# Patient Record
Sex: MALE | Race: WHITE | NOT HISPANIC OR LATINO | Employment: OTHER | URBAN - METROPOLITAN AREA
[De-identification: names, ages, dates, MRNs, and addresses within clinical notes are randomized per-mention and may not be internally consistent; named-entity substitution may affect disease eponyms.]

---

## 2017-06-03 ENCOUNTER — HOSPITAL ENCOUNTER (EMERGENCY)
Facility: CLINIC | Age: 67
Discharge: ANOTHER HOSPITAL | End: 2017-06-03
Attending: EMERGENCY MEDICINE | Admitting: EMERGENCY MEDICINE
Payer: MEDICARE

## 2017-06-03 ENCOUNTER — APPOINTMENT (OUTPATIENT)
Dept: CT IMAGING | Facility: CLINIC | Age: 67
End: 2017-06-03
Attending: EMERGENCY MEDICINE
Payer: MEDICARE

## 2017-06-03 ENCOUNTER — APPOINTMENT (OUTPATIENT)
Dept: GENERAL RADIOLOGY | Facility: CLINIC | Age: 67
End: 2017-06-03
Attending: EMERGENCY MEDICINE
Payer: MEDICARE

## 2017-06-03 ENCOUNTER — APPOINTMENT (OUTPATIENT)
Dept: GENERAL RADIOLOGY | Facility: CLINIC | Age: 67
DRG: 917 | End: 2017-06-03
Attending: ANESTHESIOLOGY
Payer: MEDICARE

## 2017-06-03 ENCOUNTER — HOSPITAL ENCOUNTER (INPATIENT)
Facility: CLINIC | Age: 67
LOS: 3 days | Discharge: HOME | DRG: 917 | End: 2017-06-06
Attending: ANESTHESIOLOGY | Admitting: ANESTHESIOLOGY
Payer: MEDICARE

## 2017-06-03 VITALS
RESPIRATION RATE: 12 BRPM | WEIGHT: 175 LBS | DIASTOLIC BLOOD PRESSURE: 53 MMHG | BODY MASS INDEX: 25.92 KG/M2 | OXYGEN SATURATION: 100 % | HEIGHT: 69 IN | SYSTOLIC BLOOD PRESSURE: 101 MMHG | TEMPERATURE: 94.6 F

## 2017-06-03 DIAGNOSIS — E87.6 HYPOKALEMIA: ICD-10-CM

## 2017-06-03 DIAGNOSIS — D72.829 LEUKOCYTOSIS, UNSPECIFIED TYPE: ICD-10-CM

## 2017-06-03 DIAGNOSIS — R07.89 CHEST WALL PAIN: ICD-10-CM

## 2017-06-03 DIAGNOSIS — R41.89 UNRESPONSIVE: ICD-10-CM

## 2017-06-03 DIAGNOSIS — R41.89 UNRESPONSIVE: Primary | ICD-10-CM

## 2017-06-03 DIAGNOSIS — K21.9 GASTROESOPHAGEAL REFLUX DISEASE, ESOPHAGITIS PRESENCE NOT SPECIFIED: ICD-10-CM

## 2017-06-03 DIAGNOSIS — R09.2 RESPIRATORY ARREST (H): ICD-10-CM

## 2017-06-03 DIAGNOSIS — E83.42 HYPOMAGNESEMIA: ICD-10-CM

## 2017-06-03 DIAGNOSIS — F10.90 ALCOHOL USE DISORDER: ICD-10-CM

## 2017-06-03 LAB
ABO + RH BLD: NORMAL
ABO + RH BLD: NORMAL
ALBUMIN SERPL-MCNC: 2.2 G/DL (ref 3.4–5)
ALBUMIN UR-MCNC: 30 MG/DL
ALP SERPL-CCNC: 42 U/L (ref 40–150)
ALT SERPL W P-5'-P-CCNC: 11 U/L (ref 0–70)
AMMONIA PLAS-SCNC: 15 UMOL/L (ref 10–50)
AMPHETAMINES UR QL SCN: NORMAL
ANION GAP SERPL CALCULATED.3IONS-SCNC: 13 MMOL/L (ref 3–14)
ANION GAP SERPL CALCULATED.3IONS-SCNC: 13 MMOL/L (ref 3–14)
APAP SERPL-MCNC: 12 MG/L (ref 10–20)
APAP SERPL-MCNC: 6 MG/L (ref 10–20)
APPEARANCE UR: CLEAR
AST SERPL W P-5'-P-CCNC: 10 U/L (ref 0–45)
BARBITURATES UR QL: NORMAL
BENZODIAZ UR QL: NORMAL
BILIRUB SERPL-MCNC: 0.3 MG/DL (ref 0.2–1.3)
BILIRUB UR QL STRIP: NEGATIVE
BLD GP AB SCN SERPL QL: NORMAL
BLOOD BANK CMNT PATIENT-IMP: NORMAL
BUN SERPL-MCNC: 12 MG/DL (ref 7–30)
BUN SERPL-MCNC: 14 MG/DL (ref 7–30)
CA-I BLD-MCNC: 4.4 MG/DL (ref 4.4–5.2)
CALCIUM SERPL-MCNC: 5.8 MG/DL (ref 8.5–10.1)
CALCIUM SERPL-MCNC: 7 MG/DL (ref 8.5–10.1)
CANNABINOIDS UR QL SCN: NORMAL
CHLORIDE SERPL-SCNC: 118 MMOL/L (ref 94–109)
CHLORIDE SERPL-SCNC: 122 MMOL/L (ref 94–109)
CK SERPL-CCNC: 87 U/L (ref 30–300)
CO2 BLD-SCNC: 16 MMOL/L (ref 21–28)
CO2 SERPL-SCNC: 14 MMOL/L (ref 20–32)
CO2 SERPL-SCNC: 17 MMOL/L (ref 20–32)
COCAINE UR QL: NORMAL
COLOR UR AUTO: YELLOW
CREAT SERPL-MCNC: 0.96 MG/DL (ref 0.66–1.25)
CREAT SERPL-MCNC: 1.12 MG/DL (ref 0.66–1.25)
ERYTHROCYTE [DISTWIDTH] IN BLOOD BY AUTOMATED COUNT: 12.9 % (ref 10–15)
ERYTHROCYTE [DISTWIDTH] IN BLOOD BY AUTOMATED COUNT: 12.9 % (ref 10–15)
ETHANOL SERPL-MCNC: 0.15 G/DL
GFR SERPL CREATININE-BSD FRML MDRD: 66 ML/MIN/1.7M2
GFR SERPL CREATININE-BSD FRML MDRD: 78 ML/MIN/1.7M2
GLUCOSE BLDC GLUCOMTR-MCNC: 263 MG/DL (ref 70–99)
GLUCOSE SERPL-MCNC: 126 MG/DL (ref 70–99)
GLUCOSE SERPL-MCNC: 274 MG/DL (ref 70–99)
GLUCOSE UR STRIP-MCNC: NEGATIVE MG/DL
HCT VFR BLD AUTO: 33.6 % (ref 40–53)
HCT VFR BLD AUTO: 38.9 % (ref 40–53)
HGB BLD-MCNC: 11.6 G/DL (ref 13.3–17.7)
HGB BLD-MCNC: 13.3 G/DL (ref 13.3–17.7)
HGB UR QL STRIP: NEGATIVE
HYALINE CASTS #/AREA URNS LPF: 1 /LPF (ref 0–2)
INR PPP: 1.13 (ref 0.86–1.14)
INTERPRETATION ECG - MUSE: NORMAL
KETONES UR STRIP-MCNC: NEGATIVE MG/DL
LACTATE BLD-SCNC: 3.7 MMOL/L (ref 0.7–2.1)
LACTATE BLD-SCNC: 5.9 MMOL/L (ref 0.7–2.1)
LEUKOCYTE ESTERASE UR QL STRIP: NEGATIVE
LIPASE SERPL-CCNC: 214 U/L (ref 73–393)
MAGNESIUM SERPL-MCNC: 1.6 MG/DL (ref 1.6–2.3)
MAGNESIUM SERPL-MCNC: 2.4 MG/DL (ref 1.6–2.3)
MCH RBC QN AUTO: 31.6 PG (ref 26.5–33)
MCH RBC QN AUTO: 31.8 PG (ref 26.5–33)
MCHC RBC AUTO-ENTMCNC: 34.2 G/DL (ref 31.5–36.5)
MCHC RBC AUTO-ENTMCNC: 34.5 G/DL (ref 31.5–36.5)
MCV RBC AUTO: 92 FL (ref 78–100)
MCV RBC AUTO: 92 FL (ref 78–100)
MUCOUS THREADS #/AREA URNS LPF: PRESENT /LPF
NITRATE UR QL: NEGATIVE
OPIATES UR QL SCN: NORMAL
PCO2 BLD: 46 MM HG (ref 35–45)
PCP UR QL SCN: NORMAL
PH BLD: 7.14 PH (ref 7.35–7.45)
PH UR STRIP: 7 PH (ref 5–7)
PHOSPHATE SERPL-MCNC: 1.7 MG/DL (ref 2.5–4.5)
PLATELET # BLD AUTO: 174 10E9/L (ref 150–450)
PLATELET # BLD AUTO: 201 10E9/L (ref 150–450)
PO2 BLD: 102 MM HG (ref 80–105)
POTASSIUM SERPL-SCNC: 2.8 MMOL/L (ref 3.4–5.3)
POTASSIUM SERPL-SCNC: 3.2 MMOL/L (ref 3.4–5.3)
PROT SERPL-MCNC: 4 G/DL (ref 6.8–8.8)
RBC # BLD AUTO: 3.65 10E12/L (ref 4.4–5.9)
RBC # BLD AUTO: 4.21 10E12/L (ref 4.4–5.9)
RBC #/AREA URNS AUTO: 0 /HPF (ref 0–2)
SALICYLATES SERPL-MCNC: NORMAL MG/DL
SAO2 % BLDA FROM PO2: 96 % (ref 92–100)
SODIUM SERPL-SCNC: 147 MMOL/L (ref 133–144)
SODIUM SERPL-SCNC: 149 MMOL/L (ref 133–144)
SP GR UR STRIP: 1.01 (ref 1–1.03)
SPECIMEN EXP DATE BLD: NORMAL
TROPONIN I SERPL-MCNC: NORMAL UG/L (ref 0–0.04)
TROPONIN I SERPL-MCNC: NORMAL UG/L (ref 0–0.04)
URN SPEC COLLECT METH UR: ABNORMAL
UROBILINOGEN UR STRIP-MCNC: NORMAL MG/DL (ref 0–2)
WBC # BLD AUTO: 21.6 10E9/L (ref 4–11)
WBC # BLD AUTO: 6 10E9/L (ref 4–11)
WBC #/AREA URNS AUTO: 2 /HPF (ref 0–2)

## 2017-06-03 PROCEDURE — 80320 DRUG SCREEN QUANTALCOHOLS: CPT | Performed by: EMERGENCY MEDICINE

## 2017-06-03 PROCEDURE — 83605 ASSAY OF LACTIC ACID: CPT | Performed by: STUDENT IN AN ORGANIZED HEALTH CARE EDUCATION/TRAINING PROGRAM

## 2017-06-03 PROCEDURE — 82330 ASSAY OF CALCIUM: CPT | Performed by: STUDENT IN AN ORGANIZED HEALTH CARE EDUCATION/TRAINING PROGRAM

## 2017-06-03 PROCEDURE — 5A1945Z RESPIRATORY VENTILATION, 24-96 CONSECUTIVE HOURS: ICD-10-PCS | Performed by: ANESTHESIOLOGY

## 2017-06-03 PROCEDURE — 27210136 ZZH KIT CATH ARTERIAL EXT SUPPLY

## 2017-06-03 PROCEDURE — 84484 ASSAY OF TROPONIN QUANT: CPT | Performed by: EMERGENCY MEDICINE

## 2017-06-03 PROCEDURE — 93010 ELECTROCARDIOGRAM REPORT: CPT | Performed by: INTERNAL MEDICINE

## 2017-06-03 PROCEDURE — 25000125 ZZHC RX 250: Performed by: STUDENT IN AN ORGANIZED HEALTH CARE EDUCATION/TRAINING PROGRAM

## 2017-06-03 PROCEDURE — 80329 ANALGESICS NON-OPIOID 1 OR 2: CPT | Performed by: STUDENT IN AN ORGANIZED HEALTH CARE EDUCATION/TRAINING PROGRAM

## 2017-06-03 PROCEDURE — 93005 ELECTROCARDIOGRAM TRACING: CPT | Mod: XU

## 2017-06-03 PROCEDURE — 83735 ASSAY OF MAGNESIUM: CPT | Performed by: STUDENT IN AN ORGANIZED HEALTH CARE EDUCATION/TRAINING PROGRAM

## 2017-06-03 PROCEDURE — 70450 CT HEAD/BRAIN W/O DYE: CPT

## 2017-06-03 PROCEDURE — 51702 INSERT TEMP BLADDER CATH: CPT | Mod: XU

## 2017-06-03 PROCEDURE — 92950 HEART/LUNG RESUSCITATION CPR: CPT

## 2017-06-03 PROCEDURE — 80053 COMPREHEN METABOLIC PANEL: CPT | Performed by: EMERGENCY MEDICINE

## 2017-06-03 PROCEDURE — 80329 ANALGESICS NON-OPIOID 1 OR 2: CPT | Performed by: EMERGENCY MEDICINE

## 2017-06-03 PROCEDURE — 85610 PROTHROMBIN TIME: CPT | Performed by: EMERGENCY MEDICINE

## 2017-06-03 PROCEDURE — 87040 BLOOD CULTURE FOR BACTERIA: CPT | Mod: 91 | Performed by: EMERGENCY MEDICINE

## 2017-06-03 PROCEDURE — 40000940 XR CHEST PORT 1 VW

## 2017-06-03 PROCEDURE — 36415 COLL VENOUS BLD VENIPUNCTURE: CPT

## 2017-06-03 PROCEDURE — 83605 ASSAY OF LACTIC ACID: CPT | Performed by: EMERGENCY MEDICINE

## 2017-06-03 PROCEDURE — 40000275 ZZH STATISTIC RCP TIME EA 10 MIN

## 2017-06-03 PROCEDURE — 36556 INSERT NON-TUNNEL CV CATH: CPT

## 2017-06-03 PROCEDURE — 71010 XR CHEST PORT 1 VW: CPT

## 2017-06-03 PROCEDURE — 96366 THER/PROPH/DIAG IV INF ADDON: CPT | Mod: 59

## 2017-06-03 PROCEDURE — 86900 BLOOD TYPING SEROLOGIC ABO: CPT | Performed by: STUDENT IN AN ORGANIZED HEALTH CARE EDUCATION/TRAINING PROGRAM

## 2017-06-03 PROCEDURE — 83935 ASSAY OF URINE OSMOLALITY: CPT | Performed by: EMERGENCY MEDICINE

## 2017-06-03 PROCEDURE — 36620 INSERTION CATHETER ARTERY: CPT | Mod: GC | Performed by: ANESTHESIOLOGY

## 2017-06-03 PROCEDURE — 20000004 ZZH R&B ICU UMMC

## 2017-06-03 PROCEDURE — 40000014 ZZH STATISTIC ARTERIAL MONITORING DAILY

## 2017-06-03 PROCEDURE — 3E033XZ INTRODUCTION OF VASOPRESSOR INTO PERIPHERAL VEIN, PERCUTANEOUS APPROACH: ICD-10-PCS | Performed by: ANESTHESIOLOGY

## 2017-06-03 PROCEDURE — 80048 BASIC METABOLIC PNL TOTAL CA: CPT | Performed by: STUDENT IN AN ORGANIZED HEALTH CARE EDUCATION/TRAINING PROGRAM

## 2017-06-03 PROCEDURE — 36600 WITHDRAWAL OF ARTERIAL BLOOD: CPT | Mod: XU

## 2017-06-03 PROCEDURE — 83930 ASSAY OF BLOOD OSMOLALITY: CPT | Performed by: EMERGENCY MEDICINE

## 2017-06-03 PROCEDURE — 31500 INSERT EMERGENCY AIRWAY: CPT

## 2017-06-03 PROCEDURE — 27210131 ZZH KIT ART LINE INSERTION

## 2017-06-03 PROCEDURE — 25000125 ZZHC RX 250: Performed by: EMERGENCY MEDICINE

## 2017-06-03 PROCEDURE — 84145 PROCALCITONIN (PCT): CPT | Performed by: STUDENT IN AN ORGANIZED HEALTH CARE EDUCATION/TRAINING PROGRAM

## 2017-06-03 PROCEDURE — 96365 THER/PROPH/DIAG IV INF INIT: CPT | Mod: 59

## 2017-06-03 PROCEDURE — 00000146 ZZHCL STATISTIC GLUCOSE BY METER IP

## 2017-06-03 PROCEDURE — 40000008 ZZH STATISTIC AIRWAY CARE

## 2017-06-03 PROCEDURE — 96368 THER/DIAG CONCURRENT INF: CPT | Mod: 59

## 2017-06-03 PROCEDURE — 82803 BLOOD GASES ANY COMBINATION: CPT

## 2017-06-03 PROCEDURE — 83735 ASSAY OF MAGNESIUM: CPT | Performed by: EMERGENCY MEDICINE

## 2017-06-03 PROCEDURE — 80307 DRUG TEST PRSMV CHEM ANLYZR: CPT | Performed by: EMERGENCY MEDICINE

## 2017-06-03 PROCEDURE — 99292 CRITICAL CARE ADDL 30 MIN: CPT

## 2017-06-03 PROCEDURE — 72125 CT NECK SPINE W/O DYE: CPT

## 2017-06-03 PROCEDURE — 84484 ASSAY OF TROPONIN QUANT: CPT | Performed by: STUDENT IN AN ORGANIZED HEALTH CARE EDUCATION/TRAINING PROGRAM

## 2017-06-03 PROCEDURE — 25000128 H RX IP 250 OP 636: Performed by: STUDENT IN AN ORGANIZED HEALTH CARE EDUCATION/TRAINING PROGRAM

## 2017-06-03 PROCEDURE — 27211117 ZZH CARDIOVERT/DEFIB/PACER SUPP

## 2017-06-03 PROCEDURE — 93005 ELECTROCARDIOGRAM TRACING: CPT

## 2017-06-03 PROCEDURE — 81001 URINALYSIS AUTO W/SCOPE: CPT | Performed by: EMERGENCY MEDICINE

## 2017-06-03 PROCEDURE — 86901 BLOOD TYPING SEROLOGIC RH(D): CPT | Performed by: STUDENT IN AN ORGANIZED HEALTH CARE EDUCATION/TRAINING PROGRAM

## 2017-06-03 PROCEDURE — 99291 CRITICAL CARE FIRST HOUR: CPT | Mod: 25

## 2017-06-03 PROCEDURE — 25000128 H RX IP 250 OP 636: Performed by: EMERGENCY MEDICINE

## 2017-06-03 PROCEDURE — 85027 COMPLETE CBC AUTOMATED: CPT | Performed by: EMERGENCY MEDICINE

## 2017-06-03 PROCEDURE — 25000128 H RX IP 250 OP 636

## 2017-06-03 PROCEDURE — 86850 RBC ANTIBODY SCREEN: CPT | Performed by: STUDENT IN AN ORGANIZED HEALTH CARE EDUCATION/TRAINING PROGRAM

## 2017-06-03 PROCEDURE — 94002 VENT MGMT INPAT INIT DAY: CPT

## 2017-06-03 PROCEDURE — 83690 ASSAY OF LIPASE: CPT | Performed by: EMERGENCY MEDICINE

## 2017-06-03 PROCEDURE — 82550 ASSAY OF CK (CPK): CPT | Performed by: STUDENT IN AN ORGANIZED HEALTH CARE EDUCATION/TRAINING PROGRAM

## 2017-06-03 PROCEDURE — 99291 CRITICAL CARE FIRST HOUR: CPT | Mod: 25 | Performed by: ANESTHESIOLOGY

## 2017-06-03 PROCEDURE — 85027 COMPLETE CBC AUTOMATED: CPT | Performed by: STUDENT IN AN ORGANIZED HEALTH CARE EDUCATION/TRAINING PROGRAM

## 2017-06-03 PROCEDURE — 84100 ASSAY OF PHOSPHORUS: CPT | Performed by: STUDENT IN AN ORGANIZED HEALTH CARE EDUCATION/TRAINING PROGRAM

## 2017-06-03 PROCEDURE — 82140 ASSAY OF AMMONIA: CPT | Performed by: STUDENT IN AN ORGANIZED HEALTH CARE EDUCATION/TRAINING PROGRAM

## 2017-06-03 PROCEDURE — 40000276 ZZH STATISTIC RCP TIME ED VENT EA 10 MIN

## 2017-06-03 PROCEDURE — 40000281 ZZH STATISTIC TRANSPORT TIME EA 15 MIN

## 2017-06-03 RX ORDER — MAGNESIUM SULFATE HEPTAHYDRATE 40 MG/ML
4 INJECTION, SOLUTION INTRAVENOUS EVERY 4 HOURS PRN
Status: DISCONTINUED | OUTPATIENT
Start: 2017-06-03 | End: 2017-06-06 | Stop reason: HOSPADM

## 2017-06-03 RX ORDER — POTASSIUM CHLORIDE 29.8 MG/ML
20 INJECTION INTRAVENOUS
Status: DISCONTINUED | OUTPATIENT
Start: 2017-06-03 | End: 2017-06-06 | Stop reason: HOSPADM

## 2017-06-03 RX ORDER — NALOXONE HYDROCHLORIDE 0.4 MG/ML
.1-.4 INJECTION, SOLUTION INTRAMUSCULAR; INTRAVENOUS; SUBCUTANEOUS
Status: DISCONTINUED | OUTPATIENT
Start: 2017-06-03 | End: 2017-06-03

## 2017-06-03 RX ORDER — POTASSIUM CHLORIDE 29.8 MG/ML
20 INJECTION INTRAVENOUS ONCE
Status: COMPLETED | OUTPATIENT
Start: 2017-06-03 | End: 2017-06-03

## 2017-06-03 RX ORDER — PIPERACILLIN SODIUM, TAZOBACTAM SODIUM 3; .375 G/15ML; G/15ML
3.38 INJECTION, POWDER, LYOPHILIZED, FOR SOLUTION INTRAVENOUS ONCE
Status: COMPLETED | OUTPATIENT
Start: 2017-06-03 | End: 2017-06-03

## 2017-06-03 RX ORDER — POTASSIUM CHLORIDE 1.5 G/1.58G
20-40 POWDER, FOR SOLUTION ORAL
Status: DISCONTINUED | OUTPATIENT
Start: 2017-06-03 | End: 2017-06-06 | Stop reason: HOSPADM

## 2017-06-03 RX ORDER — POTASSIUM CHLORIDE 7.45 MG/ML
10 INJECTION INTRAVENOUS
Status: DISCONTINUED | OUTPATIENT
Start: 2017-06-03 | End: 2017-06-06 | Stop reason: HOSPADM

## 2017-06-03 RX ORDER — LORAZEPAM 2 MG/ML
2 INJECTION INTRAMUSCULAR
Status: DISCONTINUED | OUTPATIENT
Start: 2017-06-03 | End: 2017-06-03 | Stop reason: HOSPADM

## 2017-06-03 RX ORDER — NALOXONE HYDROCHLORIDE 0.4 MG/ML
.1-.4 INJECTION, SOLUTION INTRAMUSCULAR; INTRAVENOUS; SUBCUTANEOUS
Status: DISCONTINUED | OUTPATIENT
Start: 2017-06-03 | End: 2017-06-06 | Stop reason: HOSPADM

## 2017-06-03 RX ORDER — POTASSIUM CL/LIDO/0.9 % NACL 10MEQ/0.1L
10 INTRAVENOUS SOLUTION, PIGGYBACK (ML) INTRAVENOUS
Status: DISCONTINUED | OUTPATIENT
Start: 2017-06-03 | End: 2017-06-06 | Stop reason: HOSPADM

## 2017-06-03 RX ORDER — FENTANYL CITRATE 50 UG/ML
50-100 INJECTION, SOLUTION INTRAMUSCULAR; INTRAVENOUS
Status: DISCONTINUED | OUTPATIENT
Start: 2017-06-03 | End: 2017-06-05

## 2017-06-03 RX ORDER — POTASSIUM CHLORIDE 750 MG/1
20-40 TABLET, EXTENDED RELEASE ORAL
Status: DISCONTINUED | OUTPATIENT
Start: 2017-06-03 | End: 2017-06-06 | Stop reason: HOSPADM

## 2017-06-03 RX ORDER — SODIUM CHLORIDE 9 MG/ML
INJECTION, SOLUTION INTRAVENOUS
Status: DISCONTINUED
Start: 2017-06-03 | End: 2017-06-05 | Stop reason: HOSPADM

## 2017-06-03 RX ADMIN — CALCIUM GLUCONATE 1 G: 94 INJECTION, SOLUTION INTRAVENOUS at 19:48

## 2017-06-03 RX ADMIN — POTASSIUM CHLORIDE 20 MEQ: 29.8 INJECTION, SOLUTION INTRAVENOUS at 19:49

## 2017-06-03 RX ADMIN — VANCOMYCIN HYDROCHLORIDE 1500 MG: 5 INJECTION, POWDER, LYOPHILIZED, FOR SOLUTION INTRAVENOUS at 21:10

## 2017-06-03 RX ADMIN — VASOPRESSIN 2.4 UNITS/HR: 20 INJECTION, SOLUTION INTRAMUSCULAR; SUBCUTANEOUS at 23:06

## 2017-06-03 RX ADMIN — NOREPINEPHRINE BITARTRATE 0.05 MCG/KG/MIN: 1 INJECTION INTRAVENOUS at 18:19

## 2017-06-03 RX ADMIN — NOREPINEPHRINE BITARTRATE 0.7 MCG/KG/MIN: 1 INJECTION INTRAVENOUS at 22:44

## 2017-06-03 RX ADMIN — EPINEPHRINE 0.11 MCG/KG/MIN: 1 INJECTION PARENTERAL at 22:56

## 2017-06-03 RX ADMIN — EPINEPHRINE 0.03 MCG/KG/MIN: 1 INJECTION PARENTERAL at 19:11

## 2017-06-03 RX ADMIN — PIPERACILLIN SODIUM,TAZOBACTAM SODIUM 3.38 G: 3; .375 INJECTION, POWDER, FOR SOLUTION INTRAVENOUS at 20:03

## 2017-06-03 RX ADMIN — FENTANYL CITRATE 50 MCG: 50 INJECTION INTRAMUSCULAR; INTRAVENOUS at 23:35

## 2017-06-03 RX ADMIN — SODIUM CHLORIDE 2000 ML: 9 INJECTION, SOLUTION INTRAVENOUS at 18:10

## 2017-06-03 RX ADMIN — Medication 2 G: at 19:53

## 2017-06-03 NOTE — IP AVS SNAPSHOT
MRN:[id number]                      After Visit Summary   6/3/2017    Jong Galarza    MRN: [id number]           Thank you!     Thank you for choosing [facility] for your care. Our goal is always to provide you with excellent care. Hearing back from our patients is one way we can continue to improve our services. Please take a few minutes to complete the written survey that you may receive in the mail after you visit with us. Thank you!        Patient Information     Date Of Birth          1950        Designated Caregiver       Most Recent Value    Caregiver    Will someone help with your care after discharge? no      About your hospital stay     You were admitted on:  Rosmery 3, 2017 You last received care in the:  [address] [facility]    You were discharged on:  June 6, 2017        Reason for your hospital stay       Found unresponsive, severe metabolic and respiratory acidosis secondary to alcohol use, received chest compressions and was intubated.                  Who to Call     For medical emergencies, please call 911.  For non-urgent questions about your medical care, please call your primary care provider or clinic, None          Attending Provider     Provider Specialty    Brianna Morris MD Anesthesiology    Mittal, Melinda Blum MD Internal Medicine    Osvaldo, Ines Turner MD Internal Medicine       Primary Care Provider    Physician No Ref-Primary       When to contact your care team       Call your primary doctor if you have any of the following: temperature greater than 100.5,  increased shortness of breath, unable to swallow liquids or solids, new chest pain different from current chest wall pain, feel like you are going to pass out, are tempted to use alcohol again.                  After Care Instructions     Activity       Your activity upon discharge: activity as tolerated            Diet       Follow this diet upon discharge:       Advance Diet as Tolerated:  "Regular Diet Adult                  Pending Results     Date and Time Order Name Status Description    6/3/2017 1809 Blood culture Preliminary     6/3/2017 1809 Blood culture Preliminary             Statement of Approval     Ordered          17 1553  I have reviewed and agree with all the recommendations and orders detailed in this document.  EFFECTIVE NOW     Approved and electronically signed by:  Kam Bean PA-C           17 1411  I have reviewed and agree with all the recommendations and orders detailed in this document.  EFFECTIVE NOW     Approved and electronically signed by:  Montana Daily MD             Admission Information     Date & Time Provider Department Dept. Phone    6/3/2017 Ines Riley MD [address] [facility] [place] [phone]      Your Vitals Were     Blood Pressure Pulse Temperature Respirations Weight Pulse Oximetry    147/93 (BP Location: Right arm) 85 98.5  F (36.9  C) (Oral) 18 76.2 kg (167 lb 15.9 oz) 94%    BMI (Body Mass Index)                   24.81 kg/m2           MyChart Information     [employer] lets you send messages to your doctor, view your test results, renew your prescriptions, schedule appointments and more. To sign up, go to [email or web address] . Click on \"Log in\" on the left side of the screen, which will take you to the Welcome page. Then click on \"Sign up Now\" on the right side of the page.     You will be asked to enter the access code listed below, as well as some personal information. Please follow the directions to create your username and password.     Your access code is: [email or web address]  Expires: 2017  7:31 PM     Your access code will  in 90 days. If you need help or a new code, please call your [facility] clinic or [phone].        Care EveryWhere ID     This is your Care EveryWhere ID. This could be used by other organizations to access your [facility] medical records  [id number]           Review of your " medicines      UNREVIEWED medicines. Ask your doctor about these medicines        Dose / Directions    IBUPROFEN PO   Indication:  chest wall pain   This may have changed:  Another medication with the same name was removed. Continue taking this medication, and follow the directions you see here.   Ask about: Which instructions should I use?        Dose:  400 mg   Take 400 mg by mouth every 6 hours as needed for moderate pain   Refills:  0       PROTONIX PO   Indication:  Gastroesophageal Reflux Disease        Dose:  40 mg   Take 40 mg by mouth every morning (before breakfast)   Refills:  0         START taking        Dose / Directions    acetaminophen 500 MG tablet   Commonly known as:  TYLENOL   Used for:  Chest wall pain        Dose:  1000 mg   Take 2 tablets (1,000 mg) by mouth 3 times daily as needed for mild pain or fever   Refills:  0       menthol 5 % Ptch   Commonly known as:  ICY HOT   Used for:  Chest wall pain        Dose:  1 patch   Apply 1 patch topically every 8 hours as needed for muscle soreness   Quantity:  14 patch   Refills:  0       multivitamin, therapeutic Tabs tablet        Dose:  1 tablet   Take 1 tablet by mouth daily   Quantity:  30 tablet   Refills:  0       naproxen 500 MG tablet   Commonly known as:  NAPROSYN   Used for:  Chest wall pain        Dose:  500 mg   Take 1 tablet (500 mg) by mouth 2 times daily (with meals)   Refills:  0         CONTINUE these medicines which have NOT CHANGED        Dose / Directions    LIPITOR 20 MG tablet   Generic drug:  atorvastatin        Dose:  20 mg   Take 20 mg by mouth daily.   Refills:  0       RABEprazole 20 MG EC tablet   Commonly known as:  ACIPHEX   Used for:  Gastroesophageal reflux disease, esophagitis presence not specified        Dose:  20 mg   Take 1 tablet (20 mg) by mouth daily   Quantity:  30 tablet   Refills:  0       VIAGRA 100 MG cap/tab   Generic drug:  sildenafil        Dose:  100 mg   Take 100 mg by mouth daily as needed.   Refills:   0         STOP taking     LORazepam 1 MG tablet   Commonly known as:  ATIVAN           traZODone 100 MG tablet   Commonly known as:  DESYREL           venlafaxine 75 MG tablet   Commonly known as:  EFFEXOR                Where to get your medicines      These medications were sent to [facility] - [place], [place] - [address]  [address] [address]     Phone:  [phone]     menthol 5 % Ptch    multivitamin, therapeutic Tabs tablet         Some of these will need a paper prescription and others can be bought over the counter. Ask your nurse if you have questions.     You don't need a prescription for these medications     acetaminophen 500 MG tablet    naproxen 500 MG tablet    RABEprazole 20 MG EC tablet                Protect others around you: Learn how to safely use, store and throw away your medicines at www.disposemymeds.org.             Medication List: This is a list of all your medications and when to take them. Check marks below indicate your daily home schedule. Keep this list as a reference.      Medications           Morning Afternoon Evening Bedtime As Needed    acetaminophen 500 MG tablet   Commonly known as:  TYLENOL   Take 2 tablets (1,000 mg) by mouth 3 times daily as needed for mild pain or fever   Last time this was given:  1,000 mg on 6/6/2017  4:33 PM                                LIPITOR 20 MG tablet   Take 20 mg by mouth daily.   Generic drug:  atorvastatin                                menthol 5 % Ptch   Commonly known as:  ICY HOT   Apply 1 patch topically every 8 hours as needed for muscle soreness                                multivitamin, therapeutic Tabs tablet   Take 1 tablet by mouth daily   Last time this was given:  1 tablet on 6/6/2017  8:45 AM                                naproxen 500 MG tablet   Commonly known as:  NAPROSYN   Take 1 tablet (500 mg) by mouth 2 times daily (with meals)   Last time this was given:  500 mg on  6/6/2017  7:16 PM                                PROTONIX PO   Take 40 mg by mouth every morning (before breakfast)   Last time this was given:  40 mg on 6/6/2017  8:45 AM                                RABEprazole 20 MG EC tablet   Commonly known as:  ACIPHEX   Take 1 tablet (20 mg) by mouth daily                                VIAGRA 100 MG cap/tab   Take 100 mg by mouth daily as needed.   Generic drug:  sildenafil                                  ASK your doctor about these medications           Morning Afternoon Evening Bedtime As Needed    IBUPROFEN PO   Take 400 mg by mouth every 6 hours as needed for moderate pain   Last time this was given:  400 mg on 6/5/2017  5:47 PM   Ask about: Which instructions should I use?

## 2017-06-03 NOTE — IP AVS SNAPSHOT
` ` Patient Information     Patient Name Sex     BerkowitzJong portillo ([id number]) Male 1950       Room Bed    5234 5234-02      Patient Demographics     Address Phone    [address] [address] [phone] (Home)  [phone] (Mobile)      Patient Ethnicity & Race     Ethnic Group Patient Race     White      Emergency Contact(s)     Name Relation Home Work Mobile    Fay Berkowitz Daughter   [phone]    MARAL BERKOWITZ Brother [phone]        Documents on File        Status Date Received Description       Documents for the Patient    Privacy Notice - [place] Received 11     Insurance Card Received 17 [employer]    External Medication Information Consent       Patient ID Received 17     Consent for Services - Hospital/Clinic Received () 11     Consent for EHR Access Received 17     [facility] Specified Other       Consent for Services/Privacy Notice - Hospital/Clinic Received 17     Privacy Notice - [place] Received 17        Documents for the Encounter    CMS IM for Patient Signature Received 17       Admission Information     Attending Provider Admitting Provider Admission Type Admission Date/Time    [facility]    Discharge Date Hospital Service Auth/Cert Status Service Area     Internal Medicine Incomplete [facility] SERVICES    Unit Room/Bed Admission Status       UU U5B 5234/5234-02 Admission (Confirmed)       Admission     Complaint    respiratory distress, Unresponsive      Hospital Account     Name Acct ID Class Status Primary Coverage    Geovanni Jong KERNS [id number] Inpatient Open MEDICARE - MEDICARE FOR HB SUPPLEMENT            Guarantor Account (for Hospital Account #[id number])     Name Relation to Pt Service Area Active? Acct Type    Jong Berkowitz  FCS Yes Personal/Family    Address Phone          [address] [place] [address]  [phone](H)              Coverage Information (for Hospital Account #[id number])     1. MEDICARE/MEDICARE FOR HB SUPPLEMENT     F/O Payor/Plan Precert #    MEDICARE/MEDICARE FOR HB SUPPLEMENT     Subscriber Subscriber #    Jong Galarza [id number]    Address Phone    [employer]  [address]  [place] [address] [phone]          2. MEDICA/MEDICA PRIME SOLUTION     F/O Payor/Plan Precert #    MEDICA/MEDICA PRIME SOLUTION     Subscriber Subscriber #    Jong Galarza [id number]    Address Phone    [address] [address]  [place] [address] [phone]

## 2017-06-03 NOTE — IP AVS SNAPSHOT
Jong Berkowitz #[id number] (CSN: [id number])  (67 year old M)  (Adm: 17)     [id number]               [address] [facility] BANK: [phone]            Patient Demographics     Patient Name Sex          Age SSN Address Phone    BerkowitzJong Male 1950 (67 year old) [id number] [facility] [id number] [phone] (Home)  [phone] (Mobile)      Emergency Contact(s)     Name Relation Home Work Mobile    Fay Berkowitz Daughter   [phone]    MARAL BERKOWITZ Brother [phone]        Admission Information     Attending Provider Admitting Provider Admission Type Admission Date/Time    Ines Riley MD Weinkauf, Brianna Red MD Urgent 17  2146    Discharge Date Hospital Service Auth/Cert Status Service Area     Internal Medicine [facility]    Unit Room/Bed Admission Status       Critical access hospital [id number]/5234-02 Admission (Confirmed)       Admission     Complaint    respiratory distress, Unresponsive      Hospital Account     Name Acct ID Class Status Primary Coverage    Jong Berkowitz [id number] Inpatient Open MEDICARE - MEDICARE FOR HB SUPPLEMENT            Guarantor Account (for Hospital Account #[id number])     Name Relation to Pt Service Area Active? Acct Type    Jong Berkowitz  FCS Yes Personal/Family    Address Phone          [address] [place], [place] [address] [phone](H)              Coverage Information (for Hospital Account #[id number])     1. MEDICARE/MEDICARE FOR HB SUPPLEMENT     F/O Payor/Plan Precert #    MEDICARE/MEDICARE FOR HB SUPPLEMENT     Subscriber Subscriber #    Jong Berkowitz [id number]    Address Phone    ATTN CLAIMS  [address]  [facility] [place] [address] [phone]          2. MEDICA/MEDICA PRIME SOLUTION     F/O Payor/Plan Precert #    MEDICA/MEDICA PRIME SOLUTION     Subscriber Subscriber #    Jong Berkowitz [id number]    Address Phone    [address] [address]  [place] [address] [phone]  "                                                     INTERAGENCY TRANSFER FORM - PHYSICIAN ORDERS   6/3/2017                       [address] [facility] BANK: [phone]            Attending Provider: Ines Riley MD     Allergies:  Sulfa Drugs    Infection:  None   Service:  INTERNAL MED    Ht:  1.753 m (5' 9\")   Wt:  76.2 kg (167 lb 15.9 oz)   Admission Wt:  75.5 kg (166 lb 7.2 oz)    BMI:  24.81 kg/m 2   BSA:  1.93 m 2            ED Clinical Impression     Diagnosis Description Comment Added By Time Added    Unresponsive [R41.89] Unresponsive [R41.89]  Tamia Zapata MD 6/4/2017 12:41 AM    Alcohol use disorder (H) [F10.99] Alcohol use disorder (H) [F10.99]  Gama García MD 6/5/2017  1:22 PM    Chest wall pain [R07.89] Chest wall pain [R07.89]  Gama García MD 6/5/2017  1:24 PM    Gastroesophageal reflux disease, esophagitis presence not specified [K21.9] Gastroesophageal reflux disease, esophagitis presence not specified [K21.9]  Kam Bean PA-C 6/6/2017  3:51 PM    Leukocytosis, unspecified type [D72.829] Leukocytosis, unspecified type [D72.829]  Kam Bean PA-C 6/6/2017  5:28 PM      Hospital Problems as of 6/6/2017              Priority Class Noted POA    Unresponsive Medium  6/3/2017 Yes    Alcohol use disorder (H) Medium  6/5/2017 Unknown      Non-Hospital Problems as of 6/6/2017     None      Code Status History     Date Active Date Inactive Code Status Order ID Comments User Context    6/5/2017  1:36 PM 6/5/2017  4:26 PM Full Code [id number]  Gmaa García MD Outpatient    6/3/2017 10:13 PM 6/5/2017  1:36 PM Full Code [id number]  Tamia Zapata MD Inpatient      Current Code Status     Date Active Code Status Order ID Comments User Context       6/5/2017  4:26 PM Full Code [id number]  Johny Kiran APRN CNP Inpatient       Summary of Visit     Reason for your hospital stay       Found unresponsive, severe metabolic and " respiratory acidosis secondary to alcohol use, received chest compressions and was intubated.                Medication Review      UNREVIEWED medications. Ask your doctor about these medications        Dose / Directions Comments    IBUPROFEN PO   Indication:  chest wall pain   This may have changed:  Another medication with the same name was removed. Continue taking this medication, and follow the directions you see here.   Ask about: Which instructions should I use?        Dose:  400 mg   Take 400 mg by mouth every 6 hours as needed for moderate pain   Refills:  0        PROTONIX PO   Indication:  Gastroesophageal Reflux Disease        Dose:  40 mg   Take 40 mg by mouth every morning (before breakfast)   Refills:  0          START taking        Dose / Directions Comments    acetaminophen 500 MG tablet   Commonly known as:  TYLENOL   Used for:  Chest wall pain        Dose:  1000 mg   Take 2 tablets (1,000 mg) by mouth 3 times daily as needed for mild pain or fever   Refills:  0        menthol 5 % Ptch   Commonly known as:  ICY HOT   Used for:  Chest wall pain        Dose:  1 patch   Apply 1 patch topically every 8 hours as needed for muscle soreness   Quantity:  14 patch   Refills:  0        multivitamin, therapeutic Tabs tablet        Dose:  1 tablet   Take 1 tablet by mouth daily   Quantity:  30 tablet   Refills:  0        naproxen 500 MG tablet   Commonly known as:  NAPROSYN   Used for:  Chest wall pain        Dose:  500 mg   Take 1 tablet (500 mg) by mouth 2 times daily (with meals)   Refills:  0          CONTINUE these medications which have NOT CHANGED        Dose / Directions Comments    LIPITOR 20 MG tablet   Generic drug:  atorvastatin        Dose:  20 mg   Take 20 mg by mouth daily.   Refills:  0        RABEprazole 20 MG EC tablet   Commonly known as:  ACIPHEX   Used for:  Gastroesophageal reflux disease, esophagitis presence not specified        Dose:  20 mg   Take 1 tablet (20 mg) by mouth daily  "  Quantity:  30 tablet   Refills:  0        VIAGRA 100 MG cap/tab   Generic drug:  sildenafil        Dose:  100 mg   Take 100 mg by mouth daily as needed.   Refills:  0          STOP taking     LORazepam 1 MG tablet   Commonly known as:  ATIVAN           traZODone 100 MG tablet   Commonly known as:  DESYREL           venlafaxine 75 MG tablet   Commonly known as:  EFFEXOR                   After Care     Activity       Your activity upon discharge: activity as tolerated       Diet       Follow this diet upon discharge:       Advance Diet as Tolerated: Regular Diet Adult             Statement of Approval     Ordered          06/06/17 1553  I have reviewed and agree with all the recommendations and orders detailed in this document.  EFFECTIVE NOW     Approved and electronically signed by:  Kam Bean PA-C           06/05/17 1411  I have reviewed and agree with all the recommendations and orders detailed in this document.  EFFECTIVE NOW     Approved and electronically signed by:  [place]ana Daily MD                                                 INTERAGENCY TRANSFER FORM - NURSING   6/3/2017                       [address] [facility] BANK: [phone]            Attending Provider: Ines Riley MD     Allergies:  Sulfa Drugs    Infection:  None   Service:  INTERNAL MED    Ht:  1.753 m (5' 9\")   Wt:  76.2 kg (167 lb 15.9 oz)   Admission Wt:  75.5 kg (166 lb 7.2 oz)    BMI:  24.81 kg/m 2   BSA:  1.93 m 2            Advance Directives        Does patient have a scanned Advance Directive/ACP document in EPIC?           No        Immunizations     None      ASSESSMENT     Discharge Profile Flowsheet     GASTROINTESTINAL (ADULT,PEDIATRIC,OB)     Skin WDL  ex 06/06/17 1840    GI WDL  ex 06/06/17 0345   Skin Color/Characteristics  pale 06/06/17 0345    Abdominal Appearance  contour irregular 06/06/17 0345   Skin Temperature  cool 06/06/17 0345    All Quadrants Bowel Sounds  audible and " "normoactive 06/06/17 0345   Skin Moisture  diaphoretic 06/05/17 1225    All Quadrants Palpation  soft/nontender 06/05/17 1228   Skin Elasticity  quick return to original state 06/05/17 1225    Last Bowel Movement  -- (Unknown) 06/05/17 1225   Skin Integrity  abrasion(s);bruise(s) 06/06/17 1840    COMMUNICATION ASSESSMENT     Additional Documentation  Drains (LDA) 06/04/17 0852    Patient's communication style  spoken language (English or Bilingual) 06/03/17 1845   SAFETY      SKIN     Safety WDL  ex;safety factors 06/06/17 0345    Inspection  Partial (identify areas NOT inspected) 06/06/17 1840   Safety Factors  bed in low position;wheels locked;call light in reach;upper side rails raised x 2;ID band on 06/06/17 0345    Skin areas NOT inspected  Buttock, left;Buttock, right;Sacrum 06/06/17 1840   Safety Equipment  oxygen flowmeter;manual resusciator with appropriate mask;suction equipment 06/05/17 1228                 Assessment WDL (Within Defined Limits) Definitions           Safety WDL     Effective: 09/28/15    Row Information: <b>WDL Definition:</b> Bed in low position, wheels locked; call light in reach; upper side rails up x 2; ID band on<br> <font color=\"gray\"><i>Item=AS safety wdl>>List=AS safety wdl>>Version=F14</i></font>      Skin WDL     Effective: 09/28/15    Row Information: <b>WDL Definition:</b> Warm; dry; intact; elastic; without discoloration; pressure points without redness<br> <font color=\"gray\"><i>Item=AS skin wdl>>List=AS skin wdl>>Version=F14</i></font>      Vitals     Vital Signs Flowsheet     COMMENTS     Body Movements  0 06/04/17 1548    Comments  Extubated 06/04/17 1729   Compliance w/ventilator (intubated patients)  0 06/04/17 1548    VITAL SIGNS     Vocalization (extubated patients)  Intubated 06/04/17 1548    Temp  98.5  F (36.9  C) 06/06/17 1515   Muscle Tension  0 06/04/17 1548    Temp src  Oral 06/06/17 1515   Total  0 06/04/17 1548    Resp  18 06/06/17 1515   ANALGESIA SIDE " EFFECTS MONITORING      Pulse  85 06/06/17 0656   Side Effects Monitoring: Respiratory Quality  R 06/05/17 0405    Heart Rate  90 06/06/17 1515   Side Effects Monitoring: Respiratory Depth  N 06/05/17 0405    Pulse/Heart Rate Source  Monitor 06/06/17 1515   Side Effects Monitoring: Sedation Level  2 06/05/17 0405    BP  (!)  147/93 06/06/17 1515   HEIGHT AND WEIGHT      BP Location  Right arm 06/06/17 1515   Weight  76.2 kg (167 lb 15.9 oz) 06/05/17 0549    OXYGEN THERAPY     POSITIONING      SpO2  94 % 06/06/17 1515   Body Position  independently positioning 06/06/17 1840    O2 Device  None (Room air) 06/06/17 1515   Head of Bed (HOB)  HOB at 20-30 degrees 06/06/17 0353    FiO2 (%)  30 % 06/04/17 1600   Chair  Upright in chair 06/05/17 1328    Oxygen Delivery  4 LPM 06/05/17 0405   Positioning/Transfer Devices  pillows;in use 06/05/17 1457    Suction Occurrance  1 06/04/17 1601   DAILY CARE      PAIN/COMFORT     Activity Type  ambulated in [place] 06/06/17 1856    Patient Currently in Pain  yes 06/06/17 1840   Activity Level of Assistance  assistance, stand-by 06/06/17 1840    Preferred Pain Scale  CAPA (Clinically Aligned Pain Assessment) ([facility] Adults Only) 06/06/17 1840   ECG      Patient's Stated Pain Goal  No pain 06/05/17 0405   ECG Rhythm  Normal sinus rhythm 06/05/17 1328    Pain Location  Chest 06/06/17 1840   Ectopy  None 06/05/17 1218    Pain Orientation  Mid 06/06/17 0920   Lead Monitored  Lead II;V 1 06/05/17 1218    Pain Descriptors  Aching;Sharp;Shooting 06/06/17 0920   Equipment  electrodes changed 06/04/17 0045    Pain Intervention(s)  Medication (See eMAR) 06/06/17 1840   LANCE COMA SCALE      Response to Interventions  Decrease in pain 06/06/17 1417   Best Eye Response  4-->(E4) spontaneous 06/06/17 0922    CLINICALLY ALIGNED PAIN ASSESSMENT (CAPA) ([facility] ADULTS ONLY)     Best Motor Response  6-->(M6) obeys commands 06/06/17 0922    Comfort  comfortably  manageable 06/06/17 1840   Best Verbal Response  5-->(V5) oriented 06/06/17 0922    Change in Pain  getting better 06/06/17 1417   [place] Coma Scale Score  15 06/06/17 0922    Pain Control  partially effective 06/06/17 1417   Assessment Qualifiers  patient not sedated/intubated 06/05/17 0405    Functioning  can do most things, but pain gets in the way of some 06/06/17 1417   POINT OF CARE TESTING      Sleep  awake with occasional pain 06/06/17 1417   Puncture Site  fingertip 06/05/17 0405    CRITICAL-CARE PAIN OBSERVATION TOOL (CPOT)     Bedside Glucose (mg/dl )   265 mg/dl 06/04/17 0424    Facial Expression  0 06/04/17 1548                 Patient Lines/Drains/Airways Status    Active LINES/DRAINS/AIRWAYS     None            Patient Lines/Drains/Airways Status    Active PICC/CVC     None            Intake/Output Detail Report     Date Intake       Output   Net    Shift P.O. I.V. NG/GT IV Piggyback Total Urine Emesis/NG output Total       Meghan 06/05/17 0700 - 06/05/17 3753 702 3745 -- -- 2020 3775 -- 3775 -1755    Noc 06/05/17 1500 - 06/05/17 2359 500 586.67 -- -- 1086.67 1 -- 1 1085.67    Day 06/06/17 0000 - 06/06/17 0659 -- -- -- -- -- 250 -- 250 -250    Meghan 06/06/17 0700 - 06/06/17 1459 -- -- -- -- -- -- -- -- 0    Noc 06/06/17 1500 - 06/06/17 2359 -- -- -- -- -- -- -- -- 0      Last Void/BM       Most Recent Value    Urine Occurrence 1 at 06/06/2017 1748    Stool Occurrence 1 at 06/06/2017 1748      Case Management/Discharge Planning     Case Management/Discharge Planning Flowsheet     LIVING ENVIRONMENT     QUESTION TO PATIENT:  Has a member of your family or a partner(now or in the past) intimidated, hurt, manipulated, or controlled you in any way?  patient declined to answer or is unable to answer 06/04/17 2155    Lives With  child(dane), adult 06/04/17 2154   QUESTION TO PATIENT: Do you feel safe going back to the place where you are living?  patient declined to answer or is unable to answer 06/04/17  2155    COPING/STRESS     OBSERVATION: Is there reason to believe there has been maltreatment of a vulnerable adult (ie. Physical/Sexual/Emotional abuse, self neglect, lack of adequate food, shelter, medical care, or financial exploitation)?  no 06/04/17 2155    Major Change/Loss/Stressor  none (unable to answer) 06/04/17 2157   (R) MENTAL HEALTH SUICIDE RISK      ABUSE RISK SCREEN     Are you depressed or being treated for depression?  Yes (per pt chart, patient being treated for depression) 06/04/17 2157                  [address] [facility] BANK: [phone]            Medication Administration Report for Jong Galarza as of 06/06/17 1931   Legend:    Given Hold Not Given Due Canceled Entry Other Actions    Time Time (Time) Time  Time-Action       Inactive    Active    Linked        Medications 05/31/17 06/01/17 06/02/17 06/03/17 06/04/17 06/05/17 06/06/17    acetaminophen (TYLENOL) tablet 1,000 mg  Dose: 1,000 mg Freq: 3 TIMES DAILY PRN Route: PO  PRN Reasons: mild pain,fever  Start: 06/06/17 1400   Admin Instructions: Maximum acetaminophen dose from all sources = 75 mg/kg/day not to exceed 4 gram           1633 (1,000 mg)-Given           folic acid (FOLVITE) tablet 1 mg  Dose: 1 mg Freq: DAILY Route: PO  Start: 06/05/17 0800         0900 (1 mg)-Given        0845 (1 mg)-Given           glucose 40 % gel 15-30 g  Dose: 15-30 g Freq: EVERY 15 MIN PRN Route: PO  PRN Reason: low blood sugar  Start: 06/04/17 0112   Admin Instructions: Give 15 g for BG 51 to 69 mg/dL IF patient is conscious and able to swallow. Give 30 g for BG less than or equal to 50 mg/dL IF patient is conscious and able to swallow. Do NOT give glucose gel via enteral tube.  IF patient has enteral tube: give apple juice 120 mL (4 oz or 15 g of CHO) via enteral tube for BG 51 to 69 mg/dL.  Give apple juice 240 mL (8 oz or 30 g of CHO) via enteral tube for BG less than or equal to 50 mg/dL.                     Or  dextrose 50 % injection 25-50  mL  Dose: 25-50 mL Freq: EVERY 15 MIN PRN Route: IV  PRN Reason: low blood sugar  Start: 06/04/17 0112   Admin Instructions: Use if have IV access, BG less than 70 mg/dL and meet dose criteria below:  Dose if conscious and alert (or disorientated) and NPO = 25 mL  Dose if unconscious / not alert = 50 mL  Vesicant.         1349 (25 mL)-Given            Or  glucagon injection 1 mg  Dose: 1 mg Freq: EVERY 15 MIN PRN Route: SC  PRN Reason: low blood sugar  PRN Comment: May repeat x 1 only  Start: 06/04/17 0112   Admin Instructions: May give SQ or IM. IF BG less than or equal to 50 mg/dL and no IV access.  ONLY use glucagon IF patient has NO IV access AND is UNABLE to swallow.                      ibuprofen (ADVIL/MOTRIN) tablet 400 mg  Dose: 400 mg Freq: EVERY 6 HOURS PRN Route: PO  PRN Reason: moderate pain  Start: 06/05/17 0933         1747 (400 mg)-Given            lidocaine (LIDODERM) 5 % Patch 3 patch  Dose: 3 patch Freq: EVERY 24 HOURS 0800 Route: TD  Start: 06/05/17 1015   Admin Instructions: Apply patch(s) to lower back and chest wall as needed. To prevent lidocaine toxicity, patient should be patch free for 12 hrs daily. Patches may be cut to smaller size prior to removing release liner.  NEVER APPLY HEAT OVER PATCH which will increase absorption and may lead to risk of local anesthetic toxicity. Do not apply over area where liposomal bupivacaine was injected for 96 hours post injection.          1016 (3 patch)-Given [C]        0958 (3 patch)-Given [C]           lidocaine (LIDODERM) patch in PLACE  Freq: EVERY 8 HOURS Route: TD  Start: 06/05/17 1000   Admin Instructions: Chart every shift, confirming that patch is still in place on patient (no barcode scan needed). See patch order for dose information.  NEVER APPLY HEAT OVER PATCH which will increase absorption and may lead to risk of local anesthetic toxicity. Do not apply over area where liposomal bupivacaine injected for 96 hours.          1017 ( )-Given        1928 ( )-Patch in Place        0200 ( )-Patch in Place       1003 ( )-Patch in Place       1923 ( )-Patch in Place           lidocaine (LIDODERM) patch REMOVAL  Freq: EVERY 24 HOURS 2000 Route: TD  Start: 06/05/17 2200   Admin Instructions: Patient should have a 12 hour patch free interval           0045 ( )-Patch Removed       [ ] 2200           magnesium sulfate 2 g in NS intermittent infusion ([facility] or [facility])  Dose: 2 g Freq: DAILY PRN Route: IV  PRN Reason: magnesium supplementation  Start: 06/03/17 2212   Admin Instructions: For Serum Mg++ 1.6 - 2 mg/dL  Give 2 g and recheck magnesium level next AM.         0449 (2 g)-New Bag       1758 (2 g)-New Bag             magnesium sulfate 4 g in 100 mL sterile water (premade)  Dose: 4 g Freq: EVERY 4 HOURS PRN Route: IV  PRN Reason: magnesium supplementation  Start: 06/03/17 2212   Admin Instructions: For serum Mg++ less than 1.6 mg/dL  Give 4 g and recheck magnesium level 2 hours after dose, and next AM.               menthol (ICY HOT) 5 % patch 1 patch  Dose: 1 patch Freq: EVERY 8 HOURS PRN Route: Top  PRN Reason: muscle soreness  Start: 06/05/17 1007   Admin Instructions: Apply to Skin.  Remove 'old patch' and chart on Medication Patch Removal order when new patch is applied. Avoid placing heating pad over the patch.              And  menthol (ICY HOT) Patch in Place  Freq: EVERY 8 HOURS Route: TD  Start: 06/05/17 1015   Admin Instructions: Chart every shift, confirming that patch is still in place on patient (no barcode scan needed). See patch order for dose information.          (1201)-Not Given       1928 ( )-Negative        0215 ( )-Patch in Place       1004 ( )-Negative       [ ] 1815          And  menthol (ICY HOT) patch REMOVAL  Freq: EVERY 8 HOURS PRN Route: TD  PRN Comment: for patch removal  Start: 06/05/17 1007   Admin Instructions: Remove patch when new patch is applied or patch is discontinued.               multivitamin, therapeutic (THERA-VIT)  tablet 1 tablet  Dose: 1 tablet Freq: DAILY Route: PO  Start: 06/05/17 0800         0900 (1 tablet)-Given        0845 (1 tablet)-Given           naloxone (NARCAN) injection 0.1-0.4 mg  Dose: 0.1-0.4 mg Freq: EVERY 2 MIN PRN Route: IV  PRN Reason: opioid reversal  Start: 06/03/17 2212   Admin Instructions: For respiratory rate LESS than or EQUAL to 8.  Partial reversal dose:  0.1 mg titrated q 2 minutes for Analgesia Side Effects Monitoring Sedation Level of 3 (frequently drowsy, arousable, drifts to sleep during conversation).Full reversal dose:  0.4 mg bolus for Analgesia Side Effects Monitoring Sedation Level of 4 (somnolent, minimal or no response to stimulation).               naproxen (NAPROSYN) tablet 500 mg  Dose: 500 mg Freq: 2 TIMES DAILY WITH MEALS Route: PO  Start: 06/06/17 1600                 1916 (500 mg)-Given           pantoprazole (PROTONIX) EC tablet 40 mg  Dose: 40 mg Freq: EVERY MORNING Route: PO  Start: 06/06/17 0800   Admin Instructions: DO NOT CRUSH.           0845 (40 mg)-Given           potassium chloride (KLOR-CON) Packet 20-40 mEq  Dose: 20-40 mEq Freq: EVERY 2 HOURS PRN Route: ORAL OR FEED  PRN Reason: potassium supplementation  Start: 06/03/17 2212   Admin Instructions: Use if unable to tolerate tablets.    If Serum K+ 3.4-4.0, dose = 20 mEq x1. Recheck K+ level the next AM.  If Serum K+ 3.0-3.3, dose = 60 mEq po total dose (40 mEq x 1 followed in 2 hours by 20 mEq X1). Recheck K+ level 4 hours after dose and the next AM.  If Serum K+ 2.5-2.9, dose = 80 mEq po total dose (40 mEq Q2H x2). Recheck K+ level 4 hours after dose and the next AM.  If Serum K+ less than 2.5, See IV order.  Dissolve packet contents in 4-8 ounces of cold water or juice.               potassium chloride 10 mEq in 100 mL intermittent infusion  Dose: 10 mEq Freq: EVERY 1 HOUR PRN Route: IV  PRN Reason: potassium supplementation  Start: 06/03/17 2212   Admin Instructions: Infuse via PERIPHERAL LINE or CENTRAL LINE.  Use for central line replacement if patient weight less than 65 kg, if patient is on TPN with high potassium content or if unit does not stock 20 mEq bags.  If Serum K+ 3.4-4.0, dose = 10 mEq/hr x2 doses. Recheck K+ level the next AM.  If Serum K+ 3.0-3.3, dose = 10 mEq/hr x4 doses (40 mEq IV total dose). Recheck K+ level 2 hours after dose and the next AM.  If Serum K+ less than 3.0, dose = 10 mEq/hr x6 doses (60 mEq IV total dose). Recheck K+ level 2 hours after dose and the next AM.               potassium chloride 10 mEq in 100 mL intermittent infusion with 10 mg lidocaine  Dose: 10 mEq Freq: EVERY 1 HOUR PRN Route: IV  PRN Reason: potassium supplementation  Start: 06/03/17 2212   Admin Instructions: Infuse via PERIPHERAL LINE. Use potassium with lidocaine for pain with peripheral administration.  If Serum K+ 3.4-4.0, dose = 10 mEq/hr x2 doses. Recheck K+ level the next AM.  If Serum K+ 3.0-3.3, dose = 10 mEq/hr x4 doses (40 mEq IV total dose). Recheck K+ level 2 hours after dose and the next AM.  If Serum K+ less than 3.0, dose = 10 mEq/hr x6 doses (60 mEq IV total dose). Recheck K+ level 2 hours after dose and the next AM.               potassium chloride 20 mEq in 50 mL intermittent infusion  Dose: 20 mEq Freq: EVERY 1 HOUR PRN Route: IV  PRN Reason: potassium supplementation  Last Dose: 20 mEq (06/05/17 0532)  Start: 06/03/17 2212   Admin Instructions: Infuse via CENTRAL LINE Only.  May need EKG if less than 65 kg or on TPN - Max rate is 0.3 mEq/kg/hr for patients not on EKG monitoring.    If Serum K+ 3.4-4.0, dose = 20 mEq/hr x1 doses. Recheck K+ level the next AM.  If Serum K+ 3.0-3.3, dose = 20 mEq/hr x2 doses (40 mEq IV total dose).  Recheck K+ level 2 hours after dose and the next AM.  If Serum K+ less than 3.0, dose = 20 mEq/hr x3 doses (60 mEq IV total dose). Recheck K+ level 2 hours after dose and the next AM.         0006 (20 mEq)-New Bag       0117 (20 mEq)-New Bag       0547 (20 mEq)-New Bag        1753 (20 mEq)-New Bag        0532 (20 mEq)-New Bag            potassium chloride SA (K-DUR/KLOR-CON M) CR tablet 20-40 mEq  Dose: 20-40 mEq Freq: EVERY 2 HOURS PRN Route: PO  PRN Reason: potassium supplementation  Start: 06/03/17 2212   Admin Instructions: Use if able to take PO.   If Serum K+ 3.4-4.0, dose = 20 mEq x1. Recheck K+ level the next AM.  If Serum K+ 3.0-3.3, dose = 60 mEq po total dose (40 mEq x1 followed in 2 hours by 20 mEq x1). Recheck K+ level 4 hours after dose and the next AM.  If Serum K+ 2.5-2.9, dose = 80 mEq po total dose (40 mEq Q2H x2). Recheck K+ level 4 hours after dose and the next AM.  If Serum K+ less than 2.5, See IV order.  DO NOT CRUSH.               potassium phosphate 10 mmol in D5W 250 mL intermittent infusion  Dose: 10 mmol Freq: DAILY PRN Route: IV  PRN Reason: phosphorous supplementation  Start: 06/03/17 2212   Admin Instructions: For serum phosphorus level 2.5-2.7  Do not infuse Phosphorus in the same line as TPN.   Give 10 mmol and recheck phosphorus level the next AM.               potassium phosphate 15 mmol in D5W 250 mL intermittent infusion  Dose: 15 mmol Freq: DAILY PRN Route: IV  PRN Reason: phosphorous supplementation  Start: 06/03/17 2212   Admin Instructions: For serum phosphorus level 2.0-2.4  Do not infuse Phosphorus in the same line as TPN.   Give 15 mmol and recheck phosphorus level next AM.         1932 (15 mmol)-New Bag             potassium phosphate 20 mmol in D5W 250 mL intermittent infusion  Dose: 20 mmol Freq: EVERY 6 HOURS PRN Route: IV  PRN Reason: phosphorous supplementation  Start: 06/03/17 2212   Admin Instructions: For serum phosphorus level 1.1-1.9  For CENTRAL Line ONLY  Do not infuse Phosphorus in the same line as TPN.   Give 20 mmol and recheck phosphorus level 2 hours after dose and next AM.               potassium phosphate 20 mmol in D5W 500 mL intermittent infusion  Dose: 20 mmol Freq: EVERY 6 HOURS PRN Route: IV  PRN Reason: phosphorous  supplementation  Start: 06/03/17 2212   Admin Instructions: For serum phosphorus level 1.1-1.9  For peripheral line  Do not infuse Phosphorus in the same line as TPN.   Give 20 mmol and recheck phosphorus level 2 hours after dose and next AM. Repeat if necessary.         0705 (20 mmol)-New Bag        0902 (20 mmol)-New Bag            potassium phosphate 25 mmol in D5W 500 mL intermittent infusion  Dose: 25 mmol Freq: EVERY 8 HOURS PRN Route: IV  PRN Reason: phosphorous supplementation  Start: 06/03/17 2212   Admin Instructions: For serum phosphorus level less than 1.1  Do not infuse Phosphorus in the same line as TPN.   Give 25 mmol and recheck phosphorus level 2 hours after dose and next AM.               thiamine tablet 100 mg  Dose: 100 mg Freq: DAILY Route: PO  Start: 06/05/17 0800         0900 (100 mg)-Given        0845 (100 mg)-Given          Completed Medications  Medications 05/31/17 06/01/17 06/02/17 06/03/17 06/04/17 06/05/17 06/06/17         Dose: 1 g Freq: ONCE Route: IV  Start: 06/04/17 1445   End: 06/04/17 1605        1505 (1 g)-New Bag               Freq: ONCE Route: IV  Start: 06/04/17 0130   End: 06/04/17 0207        0207 ( )-New Bag               Dose: 1 mg Freq: ONCE Route: IV  Start: 06/04/17 2115   End: 06/04/17 2128        2128 (1 mg)-Given               Dose: 1,000 mL Freq: ONCE Route: IV  Last Dose: 1,000 mL (06/04/17 0139)  Start: 06/04/17 0115   End: 06/04/17 0239        0139 (1,000 mL)-New Bag               Dose: 5 mg Freq: ONCE Route: PO  Start: 06/06/17 1645   End: 06/06/17 1704          1704 (5 mg)-Given          Discontinued Medications  Medications 05/31/17 06/01/17 06/02/17 06/03/17 06/04/17 06/05/17 06/06/17         Dose: 1,000 mg Freq: EVERY 12 HOURS Route: PO  Start: 06/05/17 0945   End: 06/06/17 1351   Admin Instructions: Maximum acetaminophen dose from all sources = 75 mg/kg/day not to exceed 4 gram          1016 (1,000 mg)-Given       2233 (1,000 mg)-Given        0845 (1,000  mg)-Given       1351-Med Discontinued         Freq: CONTINUOUS PRN Route: IV  PRN Comment: Give if on IV Insulin Infusion, and Parenteral or Enteral nutrition held or cycled off.   Start: 06/04/17 0112   End: 06/05/17 1922   Admin Instructions: Infuse IV D10W at TPN/TF rate whenever nutrition is held or cycled off.          1922-Med Discontinued          Rate: 100 mL/hr Freq: CONTINUOUS Route: IV  Start: 06/04/17 0115   End: 06/05/17 1922        0228 ( )-New Bag       1244 ( )-New Bag        1014 ( )-New Bag       1904 ( )-New Bag       1922-Med Discontinued          Dose: 5-20 mg Freq: SEE ADMIN INSTRUCTIONS Route: IV  Start: 06/04/17 0342   End: 06/05/17 1922   Admin Instructions: May change route between IV and PO based on patient need.    If Score greater than or equal to 20 give 10-20 mg and repeat Assessment and VS after 10 minutes  If Score 15-19 give 10 mg and repeat Assessment and VS after 1-2 hours.  If Score 8-14 give 5 mg and repeat Assessment and VS after 2-4 hours.  If Score less than 8 give no dose and repeat Assessment and VS after 4 hours.  Consider discontinuation of protocol if ICU Alcohol Withdrawal score less than 8 for 24 hours  Vesicant.          1922-Med Discontinued       Or    Dose: 5-20 mg Freq: SEE ADMIN INSTRUCTIONS Route: PO  Start: 06/04/17 0342   End: 06/06/17 1722   Admin Instructions: May change route between IV and PO based on patient need.    If Score greater than or equal to 20 give 10-20 mg and repeat Assessment and VS after 10 minutes   If Score 15-19 give 10 mg and repeat Assessment and VS after 1-2 hours.    If Score 8-14 give 5 mg and repeat Assessment and VS after 2-4 hours.    If Score less than 8 give no dose and repeat Assessment and VS after 4 hours.  Consider discontinuation of protocol if ICU Alcohol Withdrawal Assessment score less than 8 for 24 hours           1722-Med Discontinued         Start: 06/04/17 0318   End: 06/05/17 1126   Admin Instructions: LISA LEAVITT:  cabinet override  0.1 mg/mL = 1:10,000 concentration         (0503)-Not Given        1126-Med Discontinued          Rate: 2.4-71.5 mL/hr Freq: CONTINUOUS Route: IV  Last Dose: Stopped (06/04/17 0545)  Start: 06/03/17 2215   End: 06/05/17 1127   Admin Instructions: Start at lowest ordered doseTitrate by 0.02-0.05 mcg/kg/min every 5 minutes to keep MAP greater than 65 mmHg.  Notify provider if higher starting doses are initiated or if the titration reaches the upper range limit. Discontinue upon discharge from ICU.  Protect from light. Vesicant.        2256 (0.11 mcg/kg/min)-New Bag        0000 (0.11 mcg/kg/min)-Rate/Dose Verify       0100 (0.11 mcg/kg/min)-Rate/Dose Verify       0200 (0.09 mcg/kg/min)-Rate/Dose Change       0300 (0.09 mcg/kg/min)-Rate/Dose Verify       0350 (0.09 mcg/kg/min)-New Bag       0400 (0.09 mcg/kg/min)-Rate/Dose Verify       0500 (0.07 mcg/kg/min)-Rate/Dose Change       0502 (0.06 mcg/kg/min)-Rate/Dose Change       0514 (0.04 mcg/kg/min)-Rate/Dose Change       0545-Stopped        1127-Med Discontinued          Rate: 0.5 mL/hr Freq: CONTINUOUS Route: IV  Last Dose: Stopped (06/04/17 1720)  Start: 06/04/17 0115   End: 06/05/17 1128        0228 (25 mcg/hr)-New Bag       0300 (25 mcg/hr)-Rate/Dose Verify       0400 (25 mcg/hr)-Rate/Dose Verify       0500 (25 mcg/hr)-Rate/Dose Verify       0600 (25 mcg/hr)-Rate/Dose Verify       0700 (25 mcg/hr)-Rate/Dose Verify       0800 (25 mcg/hr)-Rate/Dose Verify       0900 (25 mcg/hr)-Rate/Dose Verify       1000 (25 mcg/hr)-Rate/Dose Verify       1100 (25 mcg/hr)-Rate/Dose Verify       1200 (25 mcg/hr)-Rate/Dose Verify       1300 (25 mcg/hr)-Rate/Dose Verify       1400 (25 mcg/hr)-Rate/Dose Verify       1500 (25 mcg/hr)-Rate/Dose Verify       1600 (25 mcg/hr)-Rate/Dose Verify       1700 (25 mcg/hr)-Rate/Dose Verify       1720-Stopped        1128-Med Discontinued          Dose:  mcg Freq: EVERY 1 HOUR PRN Route: IV  PRN Reason: severe pain  Start:  06/03/17 2212   End: 06/05/17 0413   Admin Instructions: Hold while on PCA.        2335 (50 mcg)-Given        0300 (50 mcg)-Given       0505 (100 mcg)-Given       0935 (50 mcg)-Given       1205 (50 mcg)-Given       1440 (50 mcg)-Given       1918 (50 mcg)-Given       2054 (100 mcg)-Given        0348 (100 mcg)-Given       0413-Med Discontinued          Start: 06/04/17 2114   End: 06/05/17 1911   Admin Instructions: KURT IGLESIAS: cabinet override                 1911-Med Discontinued          Dose: 0.5-1 mg Freq: EVERY 4 HOURS PRN Route: IV  PRN Reason: moderate to severe pain  Start: 06/05/17 0413   End: 06/05/17 1135         1135-Med Discontinued          Rate: 0-24 mL/hr Freq: CONTINUOUS Route: IV  Last Dose: Stopped (06/04/17 1200)  Start: 06/04/17 0115   End: 06/05/17 1922   Admin Instructions: Initiate drip with Algorithm #1 (see hyperlink to protocol). Start protocol only if glucose greater than 150 mg/dL. Maintain glucose level between 100-150 mg/dL.  Discontinue when glycemic control achieved and transitioning to SQ insulin, or Insulin therapy no longer required. When blood glucose has stabilized and patient is tolerating PO intake, call provider for transition to SQ insulin.  For Infusion Instructions, Click on ADULT DRIP PROTOCOL hyperlink below.         0207 (1.5 Units/hr)-New Bag       0300 (1.5 Units/hr)-Rate/Dose Verify       0400 (5 Units/hr)-Rate/Dose Change       0500 (5 Units/hr)-Rate/Dose Verify       0600 (5 Units/hr)-Rate/Dose Verify       0622 (3 Units/hr)-Rate/Dose Change       0700 (3 Units/hr)-Rate/Dose Verify       0800 (3 Units/hr)-Rate/Dose Verify       0900 (3 Units/hr)-Rate/Dose Verify       1000 (2 Units/hr)-Rate/Dose Change       1100 (1 Units/hr)-Rate/Dose Change       1200-Stopped       1300-Stopped        1922-Med Discontinued          Start: 06/04/17 1948   End: 06/05/17 1911   Admin Instructions: MARCELINO DURON: cabinet override                 1911-Med Discontinued           Dose: 1-2 mg Freq: EVERY 1 HOUR PRN Route: IV  PRN Reasons: anxiety,sedation,seizures  Start: 06/04/17 0340   End: 06/05/17 1922        0547 (2 mg)-Given        1922-Med Discontinued          Start: 06/04/17 0201   End: 06/05/17 1911   Admin Instructions: KURT IGLESIAS: cabinet override                 1911-Med Discontinued          Start: 06/03/17 2242   End: 06/05/17 1911   Admin Instructions: LISA LEAVITT: cabinet override                 1911-Med Discontinued          Dose: 0.1-0.4 mg Freq: EVERY 2 MIN PRN Route: IV  PRN Reason: opioid reversal  Start: 06/03/17 2212   End: 06/03/17 2215   Admin Instructions: For respiratory rate LESS than or EQUAL to 8.  Partial reversal dose:  0.1 mg titrated q 2 minutes for Analgesia Side Effects Monitoring Sedation Level of 3 (frequently drowsy, arousable, drifts to sleep during conversation).Full reversal dose:  0.4 mg bolus for Analgesia Side Effects Monitoring Sedation Level of 4 (somnolent, minimal or no response to stimulation).        2215-Med Discontinued            Rate: 2.2-67 mL/hr Freq: CONTINUOUS Route: IV  Last Dose: Stopped (06/04/17 1200)  Start: 06/03/17 2215   End: 06/05/17 1922   Admin Instructions: Start at lowest dose ordered.  Titrate by 0.03 to 0.1 mcg/kg/min every 5 minutes to keep MAP greater than 65 mmHg.    Notify provider if higher starting doses are initiated or if the titration reaches the upper range limit, or if higher doses are required to achieve blood pressure goals.  Discontinue upon discharge from ICU.  Protect from light. Vesicant.        2244 (0.7 mcg/kg/min)-New Bag        0000 (0.9 mcg/kg/min)-Rate/Dose Change       0009 (0.9 mcg/kg/min)-Rate/Dose Verify [C]       0100 (0.9 mcg/kg/min)-Rate/Dose Verify       0159 (0.9 mcg/kg/min)-Rate/Dose Verify [C]       0200 (0.9 mcg/kg/min)-Rate/Dose Verify       0300 (0.9 mcg/kg/min)-Rate/Dose Verify       0315 (0.7 mcg/kg/min)-Rate/Dose Change       0400 (0.7 mcg/kg/min)-Rate/Dose Verify        0500 (0.7 mcg/kg/min)-Rate/Dose Verify       0545 (0.5 mcg/kg/min)-Rate/Dose Change       0600 (0.5 mcg/kg/min)-Rate/Dose Verify       0612 (0.4 mcg/kg/min)-Rate/Dose Change       0700 (0.3 mcg/kg/min)-Rate/Dose Change       0800 (0.25 mcg/kg/min)-Rate/Dose Change       0845 (0.2 mcg/kg/min)-Rate/Dose Change       0900 (0.15 mcg/kg/min)-Rate/Dose Change       0915 (0.1 mcg/kg/min)-Rate/Dose Change       1000 (0.1 mcg/kg/min)-Rate/Dose Verify       1100 (0.08 mcg/kg/min)-Rate/Dose Change       1115 (0.06 mcg/kg/min)-Rate/Dose Change       1130 (0.04 mcg/kg/min)-Rate/Dose Change       1200-Stopped        1922-Med Discontinued          Dose: 5 mg Freq: EVERY 4 HOURS PRN Route: PO  PRN Reason: moderate to severe pain  Start: 06/06/17 1143   End: 06/06/17 1544          1205 (5 mg)-Given       1544-Med Discontinued         Dose: 40 mg Freq: DAILY Route: IV  Start: 06/04/17 0800   End: 06/05/17 1922   Admin Instructions: Reconstitute vial with 10mLs Saline and administer IV Push  Irritant.         0901 (40 mg)-Given        0900 (40 mg)-Given       1922-Med Discontinued          Dose: 100 mcg Freq: ONCE Route: IV  Start: 06/04/17 0330   End: 06/04/17 0426        (0337)-Not Given       0426-Med Discontinued           Dose: 100 mcg Freq: ONCE Route: IV  Start: 06/04/17 0330   End: 06/04/17 0426        (0337)-Not Given       0426-Med Discontinued           Rate: 0.5-4 mL/hr Freq: CONTINUOUS Route: IV  Last Dose: Stopped (06/04/17 0830)  Start: 06/03/17 2245   End: 06/05/17 1127   Admin Instructions: For range orders: start at lowest dose ordered. IF the prescribed dosage allows titration, titrate by 0.1 to 0.5 units/hour every 20 minutes to keep MAP greater than 65 mmHg.  Vesicant.        2306 (2.4 Units/hr)-New Bag        0159 (2.4 Units/hr)-Rate/Dose Change       0200 (2.4 Units/hr)-Rate/Dose Verify              0300 (2.4 Units/hr)-Rate/Dose Verify       0400 (2.4 Units/hr)-Rate/Dose Verify       0500 (2.4  Units/hr)-Rate/Dose Verify       0600 (2.4 Units/hr)-Rate/Dose Verify       0700 (1.2 Units/hr)-Rate/Dose Verify       0800 (1.2 Units/hr)-Rate/Dose Verify       0830-Stopped        1127-Med Discontinued     Medications 05/31/17 06/01/17 06/02/17 06/03/17 06/04/17 06/05/17 06/06/17               INTERAGENCY TRANSFER FORM - NOTES (H&P, Discharge Summary, Consults, Procedures, Therapies)   6/3/2017                       UNIT [facility] [facility] BANK: [phone]               History & Physicals      H&P by Johny Kiran APRN CNP at 6/5/2017  3:51 PM     Author:  Johny Kiran APRN CNP Service:  Internal Medicine Author Type:  Nurse Practitioner    Filed:  6/5/2017 10:19 PM Date of Service:  6/5/2017  3:51 PM Creation Time:  6/5/2017  3:46 PM    Status:  Attested Addendum :  Johny Kiran APRN CNP (Nurse Practitioner)    Cosigner:  Melinda Mittal MD at 6/6/2017 12:06 AM        Attestation signed by Melinda Mittal MD at 6/6/2017 12:06 AM        Attestation:  Physician Attestation   I, Melinda Mittal, have reviewed and discussed with the advanced practice provider their history, physical and plan for Jong Galarza.     Melinda Mittal  Date of Service: 6/5/17                               [facility], [place]    Internal Medicine History and Physical - Gold Service[NC1.1]  Transfer Note[NC1.2]       Date of Admission:  6/3/2017    Assessment & Plan   Jong Galarza is a 67 year old male admitted on 6/3/2017. He[NC1.1] has a history of alcohol abuse, depression, GERD and hyperlipidemia and was admitted after he was found unresponsive at home.  He briefly received CPR at OSH during a hypotensive episode but never lost pulses.  He was then intubated, transferred to our ICU and is now transferring to the medicine service.    1) Altered mental status - Initially hypotensive to the point of requiring pressors and chest compressions,  "metabolically acidotic and briefly intubated for airway protection.  Now significantly more responsive but statements are bizarre and do not entirely follow.  Drug screen without a clear culprit.  Question benzodiazapine OD w/ ETOH given inconsistent urine screens with benzodiazepines.  Patient unwilling to contribute to history.  - Lactic acidosis resolved, leukocytosis improved.  Off antibiotics but did receive Zosyn/Vanco x1 on 6/3.  - Placed on 72 hour hold by ICU team after daughter found suicide note at home  - Consult psychiatry    2) History of alcohol abuse - Patient unwilling to provide any details save for \"I am an alcoholic.\"  - Will place on MSSA protocol[NC1.2] w/diazepam[NC1.3] given history of alcohol abuse and unclear recent drinking history  - Thiamine, folate, MVI    3) Chest pain - Likely secondary to trauma from Lucus device during brief episode of chest compressions at [facility].  - Ibuprofen PRN pain[NC1.2]    Diet: Advance Diet as Tolerated: Regular Diet Adult  Diet  Fluids:[NC1.1] D5 1/2 NS @ 100 ccs/hr[NC1.2]  DVT Prophylaxis:[NC1.1] SCDs[NC1.3]  Code Status:[NC1.1] Full[NC1.2]    Disposition Plan   Expected discharge:[NC1.1] 2 - 3 days[NC1.3]; recommended to[NC1.1] inpatient psychiatry[NC1.3] once[NC1.1] seen by psychiatry[NC1.3].     Entered: Johny Kiran 06/05/2017, 3:46 PM   Information in the above section will display in the discharge planner report.    The patient's care was discussed with the[NC1.1] ICU team[NC1.3].    Johny Kiran  Internal Medicine Staff Hospitalist Service  [facility]  Pager:[NC1.1] [phone][NC1.3]  Please see sticky note for cross cover information  ______________________________________________________________________    Chief Complaint[NC1.1]   Found unresponsive[NC1.3]    History of Present Illness   Jong Galarza is a 67 year old male admitted on 6/3/2017. He[NC1.1] has a history of alcohol abuse, depression, GERD and " "hyperlipidemia and was admitted after he was found unresponsive at home.  He briefly received CPR at OSH during a hypotensive episode but never lost pulses.  He was then intubated, transferred to our ICU and is now transferring to the medicine service.[NC1.2]    The patient was willing to talk to me but unwilling to answer any questions.  He reports he is an alcoholic, was insistent I write down that he has an appointment with his psychiatrist tomorrow at 11:45 and that he sees a neurologist.  Any questions I had were met with \"non[NC1.3]e[NC1.4] of your business.\"    I reviewed his chart.  Per our ICU team and outside ED notes, the patient was found unresponsive at home by his daughter and brought to the ED.  There he was hypotensive and unresponsive and received a brief period of chest compressions, was intubated and started on pressors.  Labs were significant for a mixed but predominately metabolic acidosis with an elevated lactic acid.  Drug screens were negative.  Reportedly the patient had venlafaxine, trazodone and ativan available at home but was not missing any pills.  He does not have a known history of suicide attempts.    Our ICU team was planning on discharging him home today as he reportedly expressed he had no intention of not drinking[NC1.3] and we were hoping to avoid putting him through unnecessary alcohol withdrawal[NC1.4], but his daughter found a suicide note at home[NC1.3] so he[NC1.4] was instead put on a 72 hour hold and transferred to our service for psychiatry evaluation.[NC1.3]    Review of Systems[NC1.1]   Review of systems not obtained due to patient factors - patient's refusal[NC1.3]    Past Medical History[NC1.1]    Alcohol abuse[NC1.3]    Past Surgical History[NC1.1]   Patient refused to review[NC1.3]    Social History[NC1.1]     Patient reports he is an  and prior notes suggest he has been disbarred and incarcerated.  He reports he is an alcoholic with \"more time in AA than " you've been alive[NC1.3]    Family History[NC1.1]   Unwilling to share[NC1.3]    Prior to Admission Medications   Prior to Admission Medications   Prescriptions Last Dose Informant Patient Reported? Taking?   TRAZodone (DESYREL) 100 MG tablet   Yes No   Sig: Take 2 tablets by mouth At Bedtime.   atorvastatin (LIPITOR) 20 MG tablet   Yes No   Sig: Take 20 mg by mouth daily.   lorazepam (ATIVAN) 1 MG tablet   Yes No   Sig: Take 1 mg by mouth daily.   rabeprazole (ACIPHEX) 20 MG tablet   Yes No   Sig: Take 20 mg by mouth daily.   sildenafil (VIAGRA) 100 MG tablet   Yes No   Sig: Take 100 mg by mouth daily as needed.   venlafaxine (EFFEXOR) 75 MG tablet   Yes No   Sig: Take 75 mg by mouth 2 times daily.      Facility-Administered Medications: None     Allergies   Allergies   Allergen Reactions     Sulfa Drugs        Physical Exam   Vital Signs: Temp: 99.6  F (37.6  C) Temp src: Oral BP: (!) 168/116   Heart Rate: 99 Resp: 20 SpO2: 95 % O2 Device: None (Room air) Oxygen Delivery: 4 LPM  Weight: 167 lbs 15.85 oz[NC1.1]    Patient refused assessment.  He is an older gentleman resting in bed occassionally wincing and clutching a pillow to his chest.  He has no labored breathing and does not appear to be in significant distress save for when he attempts to roll over.  He refused to answer orientation questions and is generally oppositional with all questions.[NC1.3]    Data   Data reviewed today: I reviewed all medications, new labs and imaging results over the last 24 hours. I personally reviewed[NC1.1] past imaging[NC1.3] .[NC1.1]         Revision History        User Key Date/Time User Provider Type Action    > NC1.4 6/5/2017 10:19 PM Johny Kiran APRN CNP Nurse Practitioner Addend     NC1.3 6/5/2017  5:55 PM Johny Kiran APRN CNP Nurse Practitioner Sign     NC1.2 6/5/2017  4:08 PM Johny Kiran APRN CNP Nurse Practitioner      NC1.1 6/5/2017  3:46 PM Johny Kiran APRN CNP Nurse  Practitioner             H&P by Tamia Zapata MD at 6/3/2017  9:40 PM     Author:  Tamia Zapata MD Service:  Medical ICU Author Type:  Resident    Filed:  6/4/2017  5:12 AM Date of Service:  6/3/2017  9:40 PM Creation Time:  6/3/2017  9:40 PM    Status:  Attested :  Tamia Zapata MD (Resident)    Cosigner:  Brianna Morris MD at 6/4/2017  8:09 PM        Attestation signed by Brianna Morris MD at 6/4/2017  8:09 PM        Jong Galarza is a 67 year old male who was admitted on 6/3/2017 after being found unresponsive with agonal respirations.  He was brought in my EMS, intubated, placed on vasopressors.  Never had loss of pulse, though did get short term compressions for profound hypotension and bradycardia.  He remains critically ill with vasodilatory shock and coma.    I personally examined and evaluated the patient today. The care plan was developed with the house staff team or resident(s) and I agree with the findings and plan in this note aside from any exceptions noted below.  I have reviewed and evaluated the vital signs, medications, laboratory values, imaging studies, and consults from the past 24 hours.    Active problems and key treatments for today include:  1. Coma-- Suspected ingestion.  EtOH plus Effexor?  Full drug screen pending.  Supportive care.  Possible DEANNA, will need MRI if no improvement.  2. Metabolic acidosis-- Lactic from ischemic event versus additional substance ingestion.  3. Vasodilatory shock-- Substance ingestion versus inflammation from possible aspiration.  Oxygenation good on vent.  Pressors weaning.  4.  EtOH- thamine/folate, MSSA scale if mental status improves.     Usual Cares:  DVT Prophylaxis: Heparin SQ  GI Prophylaxis: PPI  Restraints: Restraints for medical healing needed: YES  Access/lines/tubes: cvl, needs yobany.  Diet: npo for now  Code status: full  Dispo: ICU    Family update by me today: No      I spent a total of 40  minutes providing critical care services.    Brianna Morris                                   [facility]      MICU History and Physicial  Jong Galarza MRN: [id number]  1950  Date of Admission:(Not on file)  Primary care provider: No primary care provider on file.      Assessment and Plan:[JS1.1]     68 yo M with hx of depression, ETOH abuse, HLD, GERD, and erectile dysfunction who was recently incarcerated for the last several months and released on 5/20/17 who was found unresponsive around 5PM on 6/3/17[JS1.2] and transferred to [facility] with concerns for acute alcohol overdose with possible venlafaxine combination.[JS1.3]    PLAN:  ===NEURO===  # Sedation[JS1.1]  #[JS1.3] AMS with[JS1.4] Concern for Anoxic Brain Injury   # ETOH Abuse with Possible Co-Ingestion of Unknown Substance  Pt does have prescriptions for venlafaxine, trazodone[JS1.3], and Ativan. Negative for benzo on UDS on admission. Venlafaxine with ETOH could account for Pt's AMS and hypotension, but venlafaxine is not dialyzable. CT Head on admission with acute intracranial abnormalities. UDS unremarkable except ETOH level of 0.15. Ammonia normal.  - In[JS1.4]itially not making any movements, but did move left hand twice during arterial line procedure and has started overbreathing the ventilator[JS1.3] with more agitation throughout the night  - Comprehensive drug screen ordered[JS1.4]  - Fent gtt @ 25 /hr with PRN bumps[JS1.3]  - Versed 1-2 mg Q1H IV PRN given increasing agitation and previously on Ativan, prevent withdrawal  - MSSA protocol with diazepam[JS1.4]  - Q2H Neuro checks[JS1.3]  - Will most likely need repeat CT Head and/or MRI brain in next 1-2 days depending mental status  - Consider Neuro consult, possible EEG  - Having Pt's daughter bring up all Pt's pill bottles to help figure out what Pt could have possibly taken[JS1.4]    ===CARDIOVASCULAR===  #[JS1.1] Hypotension  Most likely in setting of ETOH and possible  venlafaxine or other co-ingestion overdose with possible anoxic brain injury component. Afebrile, no leukocytosis on admission, normal procal, and no previous symptoms of infection per daughter PTA to suspect sepsis. No prior cardiac hx. EKG with ST depressions in V3-V4, trop negative x2.  - On norepi, vasopressin, and epi gtts  - s/p 2 doses of phenylephrine 100 mcg when MAPs acutely dropped when one of the gtt's ran out and there was a delay in getting the medication  - s/p 3L NS, 1L LR, banana bag, and on D5/0.45NS @ 100/hr  - Strict I/O  - MAP goal >65  - TTE ordered[JS1.4]    ===PULMONARY===  #[JS1.1] Intubated 2/2 Unable to Protect Airway[JS1.4]  # Metabolic Acidosis with Minimal Respiratory Compensation[JS1.5]  CXR unremarkable.  - Wean ventilator settings as able  - Monitor ABG given metabolic acidosis[JS1.4], adjust ventilator as able[JS1.5]    ===GASTROINTESTINAL===  #[JS1.1] No active issues[JS1.4]    # Nutrition:   -[JS1.1] NPO[JS1.4]    ===RENAL===  UOP:[JS1.1] 100-150 cc/hr[JS1.4]  #[JS1.1] Metabolic Acidosis  # Lactic Acidosis  Most likely in setting of ETOH abuse, possible overdose of unknown substance, consider venlafaxine. Lactate rising despite fluid resuscitation.[JS1.4] Neg ketones in urine. Neg salicylate level. Normal APAP. Negative basic UDS.[JS1.5]  - IVFs as above  - Monitor LA Q6H  - Monitoring UOP closely  - Ordered methanol level, ethylene glycol level, ketone beta-hydroxybutyrate, comprehensive drug screen  - Monitor ABGs closely    # Mild Hypernatremia   on admission.  - s/p IVFs as above  - Monitor Q6H, with max change of 10-12 mEq / 24 hours    # Hypokalemia  K 2.8 on presentation.  - s/p 20 mEq in ED, on replacement protocol  - Monitor Q6H    # Hypophosphatemia  Most likely in setting of ETOH abuse. Phos 1.7 on admission.  - Monitor Q12H, on replacement protocol[JS1.4]    # Fluids:[JS1.1] s/p 3L NS, 1L LR, banana bag, and on D5/0.45NS @  100/hr[JS1.4]    ===HEME/ONC===  #[JS1.1] Leukocytosis  Normal on presentation to OSH, elevated to 21.6 on repeat check after CPR and intubation. Most likely stress response. Very low risk for sepsis at this time.  - Will continue to monitor[JS1.4]    ===ENDOCRINE===  #[JS1.1] Hyperglycemia  Most likely 2/2 to stress. No prior hx of DM.  - Insulin gtt[JS1.4]    ===INFECTIOUS DISEASE===  #[JS1.1] No active issues[JS1.4]    # Antimicrobials:[JS1.1]  Received one dose of vanc and zosyn prior to transfer, no other abx ordered at this time[JS1.4]    ===SKIN/MSK===  #[JS1.1] No active issues[JS1.4]    LINES:[JS1.1] R femoral triple-lumen CVC (6/3), L radial arterial line (6/3), puentes, 2 PIVs, ETT, OG[JS1.4]    Prophylaxis:  DVT:[JS1.1] pneumoboots[JS1.4]   GI:[JS1.1] pantoprazole 40 IV daily[JS1.4]  Family:  Updated[JS1.1] in person[JS1.4]  Disposition: Critically Ill  Code Status:[JS1.1] FULL[JS1.4]    Patient was seen and discussed with attending physician Dr. Morris, who agrees with above assessment and plan.    Tamia Zapata MD  Internal Medicine, PGY-2  [phone]         Chief Complaint:   unresponsive         History of Present Illness:[JS1.1]   Hx obtained per chart review and discussion with Pt's daughter    68 yo M with hx of depression, ETOH abuse, HLD, GERD, and erectile dysfunction who was recently incarcerated for the last several months and released on 5/20/17 who was found unresponsive around 5PM on 6/3/17. Pt's daughter reports she saw him about three hours earlier in the day, and he was acting like his usual self with no strange behavior or complaints, and then when she returned to the house around 5PM, she found him unresponsive in his chair. She called 911, and when EMS arrived, his initial BP was 64/40 with pulse in the 50s. A blood sugar at that time was 188. There was no evidence of trauma, and no pill bottles or liquor bottles were near the Pt. Pt's breathing was very labored and near agonal  at that time. Pt got 4 mg of Narcan at the scene, but no response. He was brought to [facility] ED for further cares.     Pt's daughter reports he is a recovering alcoholic[JS1.2] with no known cardiac history[JS1.3], and she[JS1.2] knows he was not drinking while incarcerated for three months, but was not aware he had started drinking again until she found out he had ETOH in his system at the outside hospital. She did go home and found some bottles of alcohol that were empty in the house, but did not know when he drank them. She is unsure of what medications he is on, but knows he takes several including meds for depression and anxiety. She reports the Pt does see a Psychiatrist regularly. Pt has never tried to harm himself or attempt suicide in the past, and his daughter denies any strange behavior or conversations with the Pt recently regarding depressed mood. She does report she lives with the Pt, but is not with him all the time, and he has a poor support system. Pt's daughter reports he has never abused or to her knowledge used any other drugs than alcohol.    At the [facility] ED, initially tried positive pressure ventilation, then got 2L bolus of NS, then when MAPs were in the 40s with weak pulse, chest compressions were started and had three minutes of compressions with the Johnathan machine. Pt received three doses of 0.5 mg of epi and started on levophed gtt. Pt was then intubated. A central line was placed in his right femoral vein. Pt was started on an epi gtt. Given dose of vanc and zosyn. Pt got 1 g of calcium gluconate, 20 mEq of IV K, and 2 g of Mag sulfate. Pt never lost a pulse. Transferred to [facility] for ongoing cares.[JS1.3]         Review of Systems:    Unable to perform due to patient condition.          Past Medical History:   Medical History reviewed.[JS1.1]   No past medical history on file.[JS1.6]   Depression, alcohol abuse, erectile dysfunction, GERD, HLD         Past Surgical History:    Surgical History reviewed.[JS1.1]   No past surgical history on file.[JS1.6]          Social History:   Social History reviewed.[JS1.1]  Social History   Substance Use Topics     Smoking status: Not on file     Smokeless tobacco: Not on file     Alcohol use Not on file[JS1.6]   , per Pt's daughter recovering alcoholic          Family History:   Family History reviewed.[JS1.1]   No family history on file.[JS1.6]          Allergies:[JS1.1]     Allergies   Allergen Reactions     Sulfa Drugs[JS1.6]              Medications:   Medications Reviewed.[JS1.1]   Current Facility-Administered Medications   Medication     fentaNYL (SUBLIMAZE) infusion     lactated ringers BOLUS 1,000 mL     dextrose 10 % 1,000 mL infusion     insulin 1 unit/mL in saline (NovoLIN, HumuLIN Regular) drip - ADULT IV Infusion     glucose 40 % gel 15-30 g    Or     dextrose 50 % injection 25-50 mL    Or     glucagon injection 1 mg     dextrose 5% and 0.45% NaCl infusion     dextrose 5% and 0.45% NaCl 1,000 mL with multivitamin-ADULT (INFUVITE) 10 mL, thiamine 100 mg, folic acid 1 mg, potassium chloride 20 mEq infusion     dextrose 5% and 0.45% NaCl 5-0.45 % infusion     naloxone (NARCAN) injection 0.1-0.4 mg     norepinephrine (LEVOPHED) 16 mg in D5W 250 mL infusion     EPINEPHrine (ADRENALIN) 5 mg in NaCl 0.9 % 250 mL infusion     pantoprazole (PROTONIX) 40 mg IV push injection     fentaNYL Citrate (PF) (SUBLIMAZE) injection  mcg     potassium chloride SA (K-DUR/KLOR-CON M) CR tablet 20-40 mEq     potassium chloride (KLOR-CON) Packet 20-40 mEq     potassium chloride 10 mEq in 100 mL intermittent infusion     potassium chloride 10 mEq in 100 mL intermittent infusion with 10 mg lidocaine     potassium chloride 20 mEq in 50 mL intermittent infusion     magnesium sulfate 2 g in NS intermittent infusion (PharMEDium or FV Cmpd)     magnesium sulfate 4 g in 100 mL sterile water (premade)     potassium phosphate 10 mmol in D5W 250 mL  intermittent infusion     potassium phosphate 15 mmol in D5W 250 mL intermittent infusion     potassium phosphate 20 mmol in D5W 500 mL intermittent infusion     potassium phosphate 20 mmol in D5W 250 mL intermittent infusion     potassium phosphate 25 mmol in D5W 500 mL intermittent infusion     vasopressin (PITRESSIN) 40 Units in D5W 40 mL infusion     NaCl 0.9 % infusion[JS1.6]             Physical Exam:   Vitals were reviewed.[JS1.1]  Temperature 96.2  F (35.7  C), temperature source Axillary, weight 75.5 kg (166 lb 7.2 oz), SpO2 94 %.[JS1.6]    General:[JS1.1] intubated, not making any purposeful movements, NAD[JS1.2]  Skin:[JS1.1] warm and dry, not jaundiced[JS1.2]  HEENT:[JS1.1] pupils 3 mm equally, minimally reactive bilaterally, atraumatic, neck supple, dry mucous membranes[JS1.2]  CV:[JS1.1] tachy rate, regular rhythm, no murmurs, rubs, or gallops[JS1.2]  Resp: Clear to auscultation bilaterally, no wheeze[JS1.1]s or crackles[JS1.2]  Abd: Soft, non-[JS1.1]distended[JS1.2], BS+, no masses appreciated  Extremities: warm and well perfused,[JS1.1] 3+ bounding[JS1.2] palpable pulses, no[JS1.1] peripheral[JS1.2] edema  Neuro:[JS1.1] intubated, not making any purposeful movements, some twitches, did move arm a couple of times during arterial line placement, not following any commands  Psych: unable to assess[JS1.2]         Data:[JS1.1]   I/O last 3 completed shifts:  In: 10 [I.V.:10]  Out: -[JS1.6]     ROUTINE LABS (Last four results)  CMP[JS1.1]    Recent Labs  Lab 06/03/17  2234 06/03/17  1811   * 149*   POTASSIUM 3.2* 2.8*   CHLORIDE 118* 122*   CO2 17* 14*   ANIONGAP 13 13   * 126*   BUN 14 12   CR 1.12 0.96   GFRESTIMATED 66 78   GFRESTBLACK 79 >90African American GFR Calc   BLANCA 7.0* 5.8*   MAG 2.4* 1.6   PHOS 1.7*  --    PROTTOTAL  --  4.0*   ALBUMIN  --  2.2*   BILITOTAL  --  0.3   ALKPHOS  --  42   AST  --  10   ALT  --  11[JS1.6]     CBC[JS1.1]    Recent Labs  Lab 06/03/17  8867  06/03/17  1811   WBC 21.6* 6.0   RBC 4.21* 3.65*   HGB 13.3 11.6*   HCT 38.9* 33.6*   MCV 92 92   MCH 31.6 31.8   MCHC 34.2 34.5   RDW 12.9 12.9    174[JS1.6]     INR[JS1.1]    Recent Labs  Lab 06/03/17  1811   INR 1.13[JS1.6]     Arterial Blood Gas[JS1.1]    Recent Labs  Lab 06/04/17  0102 06/03/17  1949   PH 7.17* 7.14*   PCO2 33* 46*   PO2 113* 102   HCO3 12* 16*   O2PER 60.0  --[JS1.6]                 Revision History        User Key Date/Time User Provider Type Action    > JS1.5 6/4/2017  5:12 AM Tamia Zapata MD Resident Sign     JS1.4 6/4/2017  3:40 AM Tamia Zapata MD Resident      JS1.3 6/4/2017  3:01 AM Tamia Zapata MD Resident      JS1.6 6/4/2017  1:37 AM Tamia Zapata MD Resident      JS1.2 6/4/2017  1:36 AM Tamia Zapata MD Resident      JS1.1 6/3/2017  9:40 PM Tamia Zapata MD Resident                      Discharge Summaries      Discharge Summaries by Kam Bean PA-C at 6/6/2017  3:53 PM     Author:  Kam Bean PA-C Service:  Hospitalist Author Type:  Physician Assistant - C    Filed:  6/6/2017  5:30 PM Date of Service:  6/6/2017  3:53 PM Creation Time:  6/6/2017  3:53 PM    Status:  Cosign Needed :  Kma Bean PA-C (Physician Assistant - C)    Cosign Required:  Yes             [facility], [place]    Internal Medicine Discharge Summary- Gold Service    Date of Admission:  6/3/2017  Date of Discharge:[ML1.1]  6/6/2017[ML1.2]  Discharging Provider: Abdulaziz Bean PA-C with Dr. Osvaldo MD  Discharge Team: Gold 1[ML1.1]    Discharge Diagnoses[ML1.2]   Depression s/p probable SA  Etoh abuse  Acute chest wall pain  Chronic low back and shoulder pain  Leukocytosis, improving[ML1.1]  GERD[ML1.3]  Hypophosphatemia[ML1.4]       Hospital Course[ML1.2]   Jong Galarza is a 67 year old male admitted on 6/3/2017. He has a history of alcohol abuse, depression, GERD and  hyperlipidemia and was admitted after he was found unresponsive at home.  He briefly received CPR at OSH during a hypotensive episode but never lost pulses.  He was then intubated, transferred to our ICU and is now transferring to the medicine service.[ML1.1]    Depression s/p probable SA, etoh abuse:  Found unresponsive by pt's daughter on 6/3 and EMS called and brought initially to [facility], of which pt neeed to intubated for acute respiratory failure. Also needed chest compressions due to HR in the 20-30s. Transferred to [facility] MICU is critical condition, but clinical slowly improved as pt received pressures for shock and ultimately continued to improve after being extubated and off pressors. [place] pt did ingest a heavy amount of etoh as BAL on admission 0.15. There was also concern pt ingested one or more of his psychotropic meds including effexor however, after daughter checked home supply it appeared no pills were missing. Also urine drug screen negative. Nonetheless pt clinically improved to the point of being discharged as he recurrently denied SI after being medically stabilized and has not needed diazepam from [facility] protocol for at least 3 days now; however, was placed on 72 hold yesterday (6/5) after staff learned that daughter found a suicide note. Currently remains on 1:1 bed sitter.  - Transfer to inpatient psychiatric unit (3B) for ongoing evaluation of his mental status when bed opens up later today. Discussed case with accepting psychiatrist Dr. Robbins prior to transfer   - Continue 1:1 and suicide precautions in interim.     Acute chest wall pain, Chronic low back and shoulder pain:  Due to receiving Johnathan compressions at OSH prior to transfer continues with quite painful sternal pain. CXR neg for fractures. Was controlled on prn 5 mg oxycodone of which he will receive one more dose prior to transfer[ML1.3]. A[ML1.5]lso start scheduled naproxen[ML1.3].  - Continue naproxen 500 mg BID  -[ML1.5]  Continue with Lidoderm and menthol patches as well as ordered.     Leukocytosis, improving:  Improving (peak 25.5-->11.7) and most likely 2/2 stress reaction as pt has no underlying evidence of infection.[ML1.3]   - Recommend repeat CBC tomorrow am.[ML1.4]    GERD:  Continue PTA Aciphex[ML1.3]    Hypophosphatemia:  Likely due to heavy etoh abuse. Most recent phos 1.9  - Recommend recheck of lytes via BMP as well as phosphorus level tomorrow am[ML1.4]       Consultations This Hospital Stay[ML1.2]   PHARMACY IP CONSULT  SWALLOW EVAL SPEECH PATH AT BEDSIDE IP CONSULT  SOCIAL WORK IP CONSULT  PHYSICAL THERAPY ADULT IP CONSULT  OCCUPATIONAL THERAPY ADULT IP CONSULT  PSYCHIATRY IP CONSULT[ML1.6]     Code Status[ML1.2]   Full Code[ML1.1]    Time Spent on this Encounter[ML1.2]   I, Kam Bean, personally saw the patient today and spent greater than 30 minutes discharging this patient.     Discussed with attending physician MD Abdulaziz Aragon PA-C  Internal Medicine Hospitalist & Staff [place]  [facility]  [phone]   ______________________________________________________________________[ML1.1]    Physical Exam[ML1.2]   Vital Signs:[ML1.1] Temp: 98.5  F (36.9  C) Temp src: Oral[ML1.2] BP: (!) 147/93[ML1.7] Pulse: 85 Heart Rate: 90 Resp: 18 SpO2: 94 % O2 Device: None (Room air)[ML1.2]    Weight:[ML1.1] 167 lbs 15.85 oz[ML1.2]  GEN: In NAD  HEENT: NCAT; PERRL; sclerae non-icteric  LUNGS: CTAB  CHEST: Point ttp over sternum without crepitus or bony stepoffs  CV: RRR  ABD: +BSs; SNTND  EXT: No BLE edema  SKIN: No acute rashes noted on exposed areas.  NEURO: AAOx3; CNs grossly intact; No acute focal deficits noted.[ML1.1]      Significant Results and Procedures[ML1.2]   CMP[ML1.1]  Recent Labs  Lab 06/05/17  1113 06/05/17  0354 06/04/17  2105 06/04/17  1614  06/04/17  0351 06/03/17  2234 06/03/17  1811    143 144 141  < > 147* 147* 149*   POTASSIUM 4.1 3.9 4.2 3.8  < > 3.9 3.2* 2.8*    CHLORIDE  --  110*  --  114*  --  118* 118* 122*   CO2  --  24  --  22  --  12* 17* 14*   ANIONGAP  --  9  --  6  --  16* 13 13   GLC  --  108*  --  111*  --  219* 274* 126*   BUN  --  7  --  10  --  12 14 12   CR  --  0.71  --  0.84  --  0.85 1.12 0.96   GFRESTIMATED  --  >90Non  GFR Calc  --  >90Non  GFR Calc  --  >90Non  GFR Calc 66 78   GFRESTBLACK  --  >90African American GFR Calc  --  >90African American GFR Calc  --  >90African American GFR Calc 79 >90African American GFR Calc   BLANCA  --  8.2*  --  7.6*  --  7.4* 7.0* 5.8*   MAG  --  2.1  --  1.9  --  1.8 2.4* 1.6   PHOS  --  1.9*  --  2.2*  --  1.6* 1.7*  --    PROTTOTAL  --   --   --   --   --  5.2*  --  4.0*   ALBUMIN  --   --   --   --   --  2.6*  --  2.2*   BILITOTAL  --   --   --   --   --  0.3  --  0.3   ALKPHOS  --   --   --   --   --  53  --  42   AST  --   --   --   --   --  21  --  10   ALT  --   --   --   --   --  25  --  11   < > = values in this interval not displayed.[ML1.8]       CBC[ML1.1]  Recent Labs  Lab 06/05/17  0354 06/04/17  1614 06/04/17  0351 06/03/17  2234   WBC 11.7* 12.4* 25.5* 21.6*   RBC 4.15* 3.90* 4.18* 4.21*   HGB 12.8* 12.2* 13.1* 13.3   HCT 38.8* 35.7* 38.7* 38.9*   MCV 94 92 93 92   MCH 30.8 31.3 31.3 31.6   MCHC 33.0 34.2 33.9 34.2   RDW 13.2 13.2 12.9 12.9    155 217 201[ML1.8]     INR[ML1.1]  Recent Labs  Lab 06/05/17  0354 06/04/17  0351 06/03/17  1811   INR 0.98 1.18* 1.13[ML1.8]     Arterial Blood Gas[ML1.1]  Recent Labs  Lab 06/04/17  1614 06/04/17  1253 06/04/17  0351 06/04/17  0102   PH 7.33* 7.47* 7.20* 7.17*   PCO2 34* 23* 30* 33*   PO2 91 138* 92 113*   HCO3 18* 16* 12* 12*   O2PER 30.0 45.0 50 60.0[ML1.8]        Pending Results     Unresulted Labs Ordered in the Past 30 Days of this Admission     Date and Time Order Name Status Description    6/3/2017 1809 Blood culture Preliminary     6/3/2017 1809 Blood culture Preliminary[ML1.2]              Results  for orders placed or performed during the hospital encounter of 06/03/17   XR Chest Port 1 View    Narrative    EXAMINATION: XR CHEST PORT 1 VW  6/3/2017 10:50 PM      CLINICAL HISTORY: Endotracheal tube positioning    COMPARISON: Same date earlier radiograph        FINDINGS:  Interval retraction of endotracheal tube with the tip projecting 3.5  cm above arlene. Placement of OG/NG feeding tube with the tip and  sidehole projecting over the stomach.    Cardiac silhouette within normal limits. Mild left retrocardiac  atelectasis. No pleural effusion or pneumothorax. Changes of  cholecystectomy in the upper abdomen.        Impression    IMPRESSION:  1. Interval retraction of endotracheal tube with the tip projecting  3.5 cm above the arlene.  2. Placement of OG/NG feeding tube with the tip and sidehole  projecting over the stomach.  3. Mild left retrocardiac atelectasis. Otherwise, no acute airspace  disease.    I have personally reviewed the examination and initial interpretation  and I agree with the findings.    PANCHO SOLOMON[ML1.9]       Primary Care Physician   Physician No Ref-Primary    Discharge Disposition[ML1.2]   Discharged to inpatient psychiatry unit ([facility]) in [facility]  Condition at discharge: Stable[ML1.1]    Discharge Orders[ML1.2]     Reason for your hospital stay   Found unresponsive, severe metabolic and respiratory acidosis secondary to alcohol use, received chest compressions and was intubated.     Activity   Your activity upon discharge: activity as tolerated     When to contact your care team   Call your primary doctor if you have any of the following: temperature greater than 100.5,  increased shortness of breath, unable to swallow liquids or solids, new chest pain different from current chest wall pain, feel like you are going to pass out, are tempted to use alcohol again.     Full Code     Diet   Follow this diet upon discharge:      Advance Diet as Tolerated: Regular Diet Adult[ML1.10]        Discharge Medications[ML1.2]   Current Discharge Medication List      START taking these medications    Details   acetaminophen (TYLENOL) 500 MG tablet Take 2 tablets (1,000 mg) by mouth 3 times daily as needed for mild pain or fever    Associated Diagnoses: Chest wall pain      naproxen (NAPROSYN) 500 MG tablet Take 1 tablet (500 mg) by mouth 2 times daily (with meals)  Refills: 0    Associated Diagnoses: Chest wall pain      multivitamin, therapeutic (THERA-VIT) TABS tablet Take 1 tablet by mouth daily  Qty: 30 tablet, Refills: 0    Associated Diagnoses: Alcohol use disorder (H)      menthol (ICY HOT) 5 % PTCH Apply 1 patch topically every 8 hours as needed for muscle soreness  Qty: 14 patch, Refills: 0    Associated Diagnoses: Chest wall pain         CONTINUE these medications which have CHANGED    Details   RABEprazole (ACIPHEX) 20 MG EC tablet Take 1 tablet (20 mg) by mouth daily  Qty: 30 tablet    Associated Diagnoses: Gastroesophageal reflux disease, esophagitis presence not specified         CONTINUE these medications which have NOT CHANGED    Details   atorvastatin (LIPITOR) 20 MG tablet Take 20 mg by mouth daily.      sildenafil (VIAGRA) 100 MG tablet Take 100 mg by mouth daily as needed.         STOP taking these medications       IBUPROFEN PO Comments:   Reason for Stopping:         lorazepam (ATIVAN) 1 MG tablet Comments:   Reason for Stopping:         TRAZodone (DESYREL) 100 MG tablet Comments:   Reason for Stopping:         venlafaxine (EFFEXOR) 75 MG tablet Comments:   Reason for Stopping:[ML1.5]             Allergies   Allergies   Allergen Reactions     Sulfa Drugs[ML1.2]         Revision History        User Key Date/Time User Provider Type Action    > ML1.4 6/6/2017  5:30 PM Kam Bean PA-C Physician Assistant - C Sign     ML1.7 6/6/2017  5:25 PM Kam Bean PA-C Physician Assistant - C Sign     ML1.5 6/6/2017  5:24 PM Kam Bean PA-C Physician Assistant - MATHEW       ML1.10 6/6/2017  5:15 PM Kam Bean PA-C Physician Assistant - C      ML1.3 6/6/2017  5:09 PM Kam Bean PA-C Physician Assistant - C      ML1.9 6/6/2017  4:00 PM Kam Bean PA-C Physician Assistant - C      ML1.8 6/6/2017  3:59 PM Kam Bean PA-C Physician Assistant - C      ML1.6 6/6/2017  3:58 PM Kam Bean PA-C Physician Assistant - C      ML1.2 6/6/2017  3:54 PM Kam Bean PA-C Physician Assistant - C      ML1.1 6/6/2017  3:53 PM Kam Bean PA-C Physician Assistant - C             Discharge Summaries by Gama García MD at 6/5/2017 12:53 PM     Author:  Gama García MD Service:  General Medicine Author Type:  Resident    Filed:  6/5/2017  3:50 PM Date of Service:  6/5/2017 12:53 PM Creation Time:  6/5/2017 12:48 PM    Status:  In Progress :  Gama García MD (Resident)    Cosign Required:  Yes             [DP1.1]                             UPDATE:[DP1.2] 3:49 PM[DP1.3] on[DP1.2] June 5, 2017[DP1.3]  Following Discharge Summary prior to information regarding suicide note, will not be discharged today,patient kept on 72 hour hold and transferred[DP1.2]                            MICU  Discharge Summary    Jong Galarza MRN# [id number]   Age: 67 year old YOB: 1950     Date of Admission:  6/3/2017  Date of Discharge:[DP1.1]  6/5/2017[DP1.4]  Admitting Physician:  Brianna Morris MD  Discharge Physician:  [place] Daily MD  Discharging Service:  MICU     Primary Provider:   No primary care provider on file.    Patient gave psychiatrist name[DP1.1] and permission to contact[DP1.5]:   Dr. Ortega at [facility]  Ph: [phone]  Fax: [phone][DP1.1]    Appointment set for Tuesday June 6th, 2017 @ 1140 AM[DP1.5]            Discharge Disposition:   Discharged to home, urged close follow up with psychiatry[DP1.1] (as above)[DP1.5], significant concern for patient  continuing alcohol use, declined detox and any referral for rehab services.           Condition on Discharge:   Discharge condition: Satisfactory   Code status on discharge: Full Code          Admission Diagnoses:   respiratory distress  Unresponsive          Principle Discharge Problem:[DP1.1]   Alcohol use disorder  Respiratory Distress: resolved[DP1.5]  Altered Mental Status: resolved[DP1.6]         Additional Discharge Problems:[DP1.1]     Patient Active Problem List    Diagnosis Date Noted     Alcohol use disorder (H) 06/05/2017     Priority: Medium     Unresponsive 06/03/2017     Priority: Medium[DP1.7]            Medications Prior to Admission:[DP1.1]       No current facility-administered medications on file prior to encounter.   Current Outpatient Prescriptions on File Prior to Encounter:  rabeprazole (ACIPHEX) 20 MG tablet Take 20 mg by mouth daily.   atorvastatin (LIPITOR) 20 MG tablet Take 20 mg by mouth daily.   lorazepam (ATIVAN) 1 MG tablet Take 1 mg by mouth daily.   TRAZodone (DESYREL) 100 MG tablet Take 2 tablets by mouth At Bedtime.   venlafaxine (EFFEXOR) 75 MG tablet Take 75 mg by mouth 2 times daily.   sildenafil (VIAGRA) 100 MG tablet Take 100 mg by mouth daily as needed.[DP1.7]            Discharge Medications:[DP1.1]        Review of your medicines      START taking       Dose / Directions    acetaminophen 500 MG tablet   Commonly known as:  TYLENOL   Used for:  Chest wall pain        Dose:  1000 mg   Take 2 tablets (1,000 mg) by mouth every 12 hours   Quantity:  28 tablet   Refills:  0       ibuprofen 400 MG tablet   Commonly known as:  ADVIL/MOTRIN   Used for:  Chest wall pain        Dose:  400 mg   Take 1 tablet (400 mg) by mouth every 6 hours as needed for moderate pain   Quantity:  21 tablet   Refills:  0       menthol 5 % Ptch   Commonly known as:  ICY HOT   Used for:  Chest wall pain        Dose:  1 patch   Apply 1 patch topically every 8 hours as needed for muscle soreness    Quantity:  14 patch   Refills:  0       multivitamin, therapeutic Tabs tablet        Dose:  1 tablet   Take 1 tablet by mouth daily   Quantity:  30 tablet   Refills:  0         CONTINUE these medicines which have NOT CHANGED       Dose / Directions    ACIPHEX 20 MG EC tablet   Generic drug:  RABEprazole        Dose:  20 mg   Take 20 mg by mouth daily.   Refills:  0       LIPITOR 20 MG tablet   Generic drug:  atorvastatin        Dose:  20 mg   Take 20 mg by mouth daily.   Refills:  0       LORazepam 1 MG tablet   Commonly known as:  ATIVAN        Dose:  1 mg   Take 1 mg by mouth daily.   Refills:  0       traZODone 100 MG tablet   Commonly known as:  DESYREL        Dose:  2 tablet   Take 2 tablets by mouth At Bedtime.   Refills:  0       venlafaxine 75 MG tablet   Commonly known as:  EFFEXOR        Dose:  75 mg   Take 75 mg by mouth 2 times daily.   Refills:  0       VIAGRA 100 MG cap/tab   Generic drug:  sildenafil        Dose:  100 mg   Take 100 mg by mouth daily as needed.   Refills:  0            Where to get your medicines      These medications were sent to [facility] - [address] [address]     Phone:  [phone]      acetaminophen 500 MG tablet     ibuprofen 400 MG tablet     menthol 5 % Ptch     multivitamin, therapeutic Tabs tablet[DP1.7]                  Discharge Instructions and Follow-Up:   Discharge diet: Regular   Discharge activity: Activity as tolerated will likely    Discharge follow-up:[DP1.1] Follow up with Dr. Ortega at [facility] in [place] on Tuesday June 6th, 2017 @ 1140 AM (appointment scheduled and discharge summary faxed as per your request).  Phone: [phone][DP1.5]   Lines and drains:[DP1.1] None[DP1.5]    Wound care:[DP1.1] None[DP1.5]          Brief Admission History and Evaluation:[DP1.1]     Jong Galarza is a 67 year old male with hx of depression, ETOH abuse, HLD, GERD, and erectile  dysfunction who was recently incarcerated (released on 5/20/17), who was found unresponsive with agonal respirations, brought in by EMS to [facility], intubated, placed on vasopressors, never had loss of pulse, though did get short term compressions for profound hypotension and bradycardia.  He remained critically ill with vasodilatory shock and coma, transferred to [facility] for further care.    Pt's daughter reports she saw him about three hours earlier in the day, and he was acting like his usual self with no strange behavior or complaints, and then when she returned to the house around 5PM, she found him unresponsive in his chair. She called 911, and when EMS arrived, his initial BP was 64/40 with pulse in the 50s. A blood sugar at that time was 188. There was no evidence of trauma, and no pill bottles or liquor bottles were near the Pt. Pt's breathing was very labored and near agonal at that time. Pt got 4 mg of Narcan at the scene, but no response. He was brought to [facility] ED for further cares.   Pt's daughter reports he is a recovering alcoholic with no known cardiac history, and she knows he was not drinking while incarcerated for three months, but was not aware he had started drinking again until she found out he had ETOH in his system at the outside hospital. She did go home and found some bottles of alcohol that were empty in the house, but did not know when he drank them. She is unsure of what medications he is on, but knows he takes several including meds for depression and anxiety. She reports the Pt does see a Psychiatrist regularly. Pt has never tried to harm himself or attempt suicide in the past, and his daughter denies any strange behavior or conversations with the Pt recently regarding depressed mood. She does report she lives with the Pt, but is not with him all the time, and he has a poor support system. Pt's daughter reports he has never abused or to her  knowledge used any other drugs than alcohol.  At the [facility] ED, initially tried positive pressure ventilation, then got 2L bolus of NS, then when MAPs were in the 40s with weak pulse, chest compressions were started and had three minutes of compressions with the Johnathan machine. Pt received three doses of 0.5 mg of epi and started on levophed gtt. Pt was then intubated. A central line was placed in his right femoral vein. Pt was started on an epi gtt. Given dose of vanc and zosyn. Pt got 1 g of calcium gluconate, 20 mEq of IV K, and 2 g of Mag sulfate. Pt never lost a pulse. Transferred to [facility] for ongoing cares.[DP1.6]          Hospital Course/Discharge Plan by Problem:[DP1.1]   ===NEURO===  # Sedation: none currently  # AMS: resolved  # ETOH Abuse: acute on chronic  Pt does have prescriptions for venlafaxine, trazodone, and Ativan. Negative for benzo on UDS on admission.  CT Head on admission with no acute intracranial abnormalities. UDS unremarkable except ETOH level of 0.15. Ammonia normal. Daughter brought in pill bottles, no significant amounts missing, no unreported medications.  No known aspiration, no seizure history.   - By 6/4/17 @ ~1800: Off all pressors, extubated  - Comprehensive drug screen: negative except caffeine  - Versed 1-2 mg Q1H IV PRN (currently requiring none >12 hours)  - MSSA protocol with diazepam: no active signs of withdrawal    Declined any referrals, social work visit, information regarding detox, rehab, or any adjunct services for alcohol use.        ===CARDIOVASCULAR===  # Hypotension: resolved  # CPR at outside facility: ~ 3 min  Afebrile and no leukocytosis on admission, normal procal, and no previous symptoms of infection per daughter PTA to suspect sepsis. No prior cardiac hx. EKG with ST depressions in V3-V4, trop negative x2.  - Not requiring vasopressors as of 6/4/17 afternoon, previously on norepi, vasopressin, and epi gtts  - s/p 3L NS, 1L LR, banana bag, and then  D5/0.45NS @ 100/hr  - Currently tolerating po intake  - Echo: EF 55-60%, no significant abnormalities      ===PULMONARY===  # Intubated 2/2 Unable to Protect Airway: Extubated 6/4/17  # + AGAP, no osmol gap (accounting for ethanol), mixed metabolic and respiratory acidosis: resolved  CXR unremarkable on admission.  Corrected AGAP of 17 at [facility]  No significant Osmol gap (correcting for ethanol = -10.4)  ABG at [facility] ED:pH 7.14 pCO2 46 pO2 102 Bicarb 16 (serum bicarb 14).  - Winter's formula: would have expected pCO2 of 27-31 for appropriate compensation  - Acidemia corrected s/p fluid resuscitation and ventilatory support, likely due to ethanol ingestion causing metabolic acidosis and subsequent respiratory failure when unresponsive      ===GASTROINTESTINAL===  # No active issues      # Nutrition:   - passed bedside swallow by nursing staff  - ADAT  - will monitor phosphate and other electrolytes closely given alcoholism      ===RENAL===  I/O last 3 completed shifts:  In: 4328.6 [I.V.:4298.6; NG/GT:30]  Out: 5590 [Urine:5490; Emesis/NG output:100]     # + AGAP, no osmol gap, Mixed Metabolic and Respiratory Acidosis (see pulmonary as well): resolved  # Lactic Acidosis: resolved  Most likely in setting of ETOH abuse and poor respiration, patient reports only ingested 'cheap vodka and very old cranberry juice'.  Lactate gaby initially despite fluid resuscitation but has corrected rapidly since to normal. Neg ketones in urine. Neg salicylate level. Normal APAP. Negative basic UDS.  Did have chest compressions for 3 min at [facility] ED via Sukhdeep device, had palpable pulse as per report.  - Lactic acid now wnl, serum bicarb normal  - Good urine output  - Ketone beta-hydroxybutyrate negative, ethylene, methanol, comprehensive drug screen negative      # Mild Hypernatremia: resolved   on admission, now wnl  - s/p IVFs as above  - Having increased urine output, will trend sodium in case of post-MARC diuresis vs.  Central DI      # Hypokalemia: resolved  K 2.8 on presentation, now wnl  - replacement protocol  - BMP q12h      # Hypophosphatemia: resolved  Most likely in setting of ETOH abuse. Phos 1.7 on admission  - Monitor Q12H, on replacement protocol      # Fluids:   - s/p 3L NS, 1L LR, banana bag on admission, D5/0.45NS @ 100/hr  - Now tolerating po intake      ===HEME/ONC===  # Leukocytosis: improving  Normal on presentation to OSH, elevated to >20 on repeat check after CPR and intubation. Likely stress response given rapid correction not on antibiotics, has been afebrile >24 hours.  - Will continue to monitor, had one dose vanc/zosyn at [facility] ED  - Not currently on antibiotics, did have temp 101 @ 6/4/17 0700 AM, afebrile since, WBC downtrending      ===ENDOCRINE===  # Hyperglycemia: resolved  Most likely 2/2 to stress. No prior hx of DM.  - Insulin gtt      ===INFECTIOUS DISEASE===  # No known infection (see leukocytosis above)      # Antimicrobials:  Received one dose of vanc and zosyn prior to transfer  - No antibiotics currently      ===SKIN/MSK===  # Chronic low back and shoulder pain  # Acute chest wall pain s/p CPR: no flail chest  - Oral tylenol 1000mg q12h prn (not to exceed 2g given alcohol use/liver)  - Oral ibuprofen 400mg q6h prn (MARC resolved)  - Lidoderm patches q12h prn  - Menthol patches prn  - If still uncontrolled can do oral dilaudid, avoid opioids as possible given substance use history and recent extubation      LINES: All removed prior to discharge L radial arterial line (6/3-6/4), Femoral CVC (6/3-6/5), puentes (removed), 2 PIVs      Prophylaxis:  DVT: pneumoboots   GI: pantoprazole 40 IV daily  Code Status: FULL[DP1.6]         Final Day of Progress before Discharge:         Interval History:[DP1.1]  General:  feels better, more alert and more responsive   Vitals: vitals signs have been stable, no fever and no signficant change in respiratory or heart rates   Intake/Output: improved hydration,  improved urinary output and tolerating oral intake   Nutrition: tolerating an advancing diet   Mental status: improved cognition and overall mental status, more alert, more responsive and less confused   Respiratory: improved respiratory effort and improved oxygenation   Cardiovascular blood pressure improved, no palpitations, blood pressure stable and heart rate stable   Renal: improved renal function and urinary output   Neurologic: improved motor function, improved speech, responding better to verbal requests, no focal deficits reported and no changes in the cranial nerves   Medications: Vasopressors discontinued and sedation weaned >12 hours prior to discharge, not requiring any hemodynamic support on discharge   Interventions: No interventions performed since the last visit   Consults: No consultations were requested since the last visit[DP1.6]           Physical Exam:[DP1.1]  Vitals were reviewed[DP1.6]  Patient Vitals for the past 8 hrs:   BP Temp Temp src Heart Rate Resp SpO2   06/05/17 1300 (!) 145/93 - - 83 20 93 %   06/05/17 1200 150/81 99.6  F (37.6  C) Oral 109 - 93 %   06/05/17 1100 134/82 - - 84 - 93 %   06/05/17 1000 (!) 141/111 - - 85 - 94 %   06/05/17 0900 155/87 - - 94 - 97 %   06/05/17 0800 150/86 99.7  F (37.6  C) Bladder 96 20 97 %[DP1.8]     GEN: Awake, opens eyes to voice, able to answer questions and make needs known  EYES: PERRL, EOMI  CV: RRR, no gallops, rubs, or mumurs  PULM: CTAB, symmetric chest rise, tenderness of anterior chest wall likely 2/2 CPR, no signs of flail chest or overt deformity  GI: No distension, normal bowel sounds, non-tender, no rebound or guarding  : puentes catheter in place  EXTREMITIES: No pedal edema, moving all extremities, peripheral pulses intact  NEURO: AOx3, opens eyes to voice, tracks, able to move all extremities, no motor-sensory deficits noted, cranial nerves 2-12 grossly intact  SKIN: No rashes noted  Psych: Alcohol use disorder, has poor understanding  of severity of illness and appears unconcerned with severity of recent episode. Denies any desire to harm himself previously or currently.  He was focused today on leaving hospital.  Declined any referrals, social work visit, information regarding detox, rehab, or any adjunct services for alcohol use.   [DP1.6]             Data:[DP1.1]  Lab Results   Component Value Date    WBC 11.7 (H) 06/05/2017    WBC 12.4 (H) 06/04/2017    WBC 25.5 (H) 06/04/2017    HGB 12.8 (L) 06/05/2017    HGB 12.2 (L) 06/04/2017    HGB 13.1 (L) 06/04/2017    HCT 38.8 (L) 06/05/2017    HCT 35.7 (L) 06/04/2017    HCT 38.7 (L) 06/04/2017     06/05/2017     06/04/2017     06/04/2017     06/05/2017     06/05/2017     06/04/2017    POTASSIUM 4.1 06/05/2017    POTASSIUM 3.9 06/05/2017    POTASSIUM 4.2 06/04/2017    CHLORIDE 110 (H) 06/05/2017    CHLORIDE 114 (H) 06/04/2017    CHLORIDE 118 (H) 06/04/2017    CO2 24 06/05/2017    CO2 22 06/04/2017    CO2 12 (L) 06/04/2017    BUN 7 06/05/2017    BUN 10 06/04/2017    BUN 12 06/04/2017    CR 0.71 06/05/2017    CR 0.84 06/04/2017    CR 0.85 06/04/2017     (H) 06/05/2017     (H) 06/04/2017     (H) 06/04/2017    TROPONIN <0.07 01/31/2006    TROPI  06/03/2017     <0.015  The 99th percentile for upper reference range is 0.045 ug/L.  Troponin values in   the range of 0.045 - 0.120 ug/L may be associated with risks of adverse   clinical events.      TROPI  06/03/2017     <0.015  The 99th percentile for upper reference range is 0.045 ug/L.  Troponin values in   the range of 0.045 - 0.120 ug/L may be associated with risks of adverse   clinical events.      AST 21 06/04/2017    AST 10 06/03/2017    AST 28 06/25/2009    ALT 25 06/04/2017    ALT 11 06/03/2017    ALT 21 06/25/2009    ALKPHOS 53 06/04/2017    ALKPHOS 42 06/03/2017    ALKPHOS 48 06/25/2009    BILITOTAL 0.3 06/04/2017    BILITOTAL 0.3 06/03/2017    BILITOTAL 0.5 06/25/2009    PARKER 15  06/03/2017    INR 0.98 06/05/2017    INR 1.18 (H) 06/04/2017    INR 1.13 06/03/2017[DP1.9]     All cardiac studies reviewed by me.  All imaging studies reviewed by me.[DP1.6]  Results for orders placed or performed during the hospital encounter of 06/03/17   XR Chest Port 1 View    Narrative    EXAMINATION: XR CHEST PORT 1 VW  6/3/2017 10:50 PM      CLINICAL HISTORY: Endotracheal tube positioning    COMPARISON: Same date earlier radiograph        FINDINGS:  Interval retraction of endotracheal tube with the tip projecting 3.5  cm above arlene. Placement of OG/NG feeding tube with the tip and  sidehole projecting over the stomach.    Cardiac silhouette within normal limits. Mild left retrocardiac  atelectasis. No pleural effusion or pneumothorax. Changes of  cholecystectomy in the upper abdomen.        Impression    IMPRESSION:  1. Interval retraction of endotracheal tube with the tip projecting  3.5 cm above the arlene.  2. Placement of OG/NG feeding tube with the tip and sidehole  projecting over the stomach.  3. Mild left retrocardiac atelectasis. Otherwise, no acute airspace  disease.    I have personally reviewed the examination and initial interpretation  and I agree with the findings.    PANCHO SOLOMON[DP1.10]              Pending Results:[DP1.1]   None[DP1.6]      It was a pleasure to participate in the care of Jong Galarza.  If you have questions about[DP1.1] his[DP1.6] care, please do not hesitate to contact the[DP1.1] [facility].[DP1.5]    [facility]  [facility], [facility][DP1.1] 4C[DP1.5] - [phone][DP1.1]43[DP1.5]       Revision History        User Key Date/Time User Provider Type Action    > DP1.3 6/5/2017  3:50 PM Gama García MD Resident Sign     DP1.2 6/5/2017  3:48 PM Gama García MD Resident      [N/A] 6/5/2017  3:47 PM Gama García MD Resident Incomplete Revision     DP1.10  2017  2:09 PM Gama García MD Resident Sign     DP1.9 2017  2:08 PM Gama García MD Resident      DP1.8 2017  2:04 PM Gama García MD Resident      DP1.6 2017  1:47 PM Gama García MD Resident      DP1.7 2017  1:37 PM Gama García MD Resident      DP1.5 2017  1:16 PM Gama García MD Resident      DP1.4 2017 12:49 PM Gama García MD Resident      DP1.1 2017 12:48 PM Gama García MD Resident                      Consult Notes      Consults by Montana Guzman MD at 6/3/2017  9:46 PM     Author:  Montana Guzman MD Service:  Psychiatry Author Type:  Physician    Filed:  2017 10:54 AM Date of Service:  6/3/2017  9:46 PM Creation Time:  2017 10:54 AM    Status:  Signed :  Montana Guzman MD (Physician)     Consult Orders:    1. Psychiatry IP Consult: suicide attempt; Consultant may enter orders: Yes; Patient to be seen: ASAP; Call back #: Ph: [phone] P [[phone]] ordered by Gama García MD at 17 1529                Patient seen and chart reviewed. Formal consult dictated.(initial)    Montana Guzman MD[WM1.1]     Revision History        User Key Date/Time User Provider Type Action    > WM1.1 2017 10:54 AM Montana Guzman MD Physician Sign            Consults by Kandace Ring at 2017  4:35 PM     Author:  Kandace Ring Service:  Social Work Author Type:      Filed:  2017  4:35 PM Date of Service:  2017  4:35 PM Creation Time:  2017  4:29 PM    Status:  Signed :  Kandace Ring ()     Consult Orders:    1. Swallow Eval Speech Path Bedside IP Consult [[id number]] ordered by aGma García MD at 17 0708                Social Work Note:     Request for SW consult received; per H&P, patient is a 67 year old male with history of depression, ETOH abuse, HLD, GERD, who was recently incarcerated for the  last several months and released on 5/20/17; found unresponsive around 5PM on 6/3/17 and transferred to [facility] with concerns for acute alcohol overdose with possible venlafaxine combination; intubated and placed on pressors.     Reviewed patient's status during care rounds and reviewed chart; noted patient was extubated; disoriented to situation. Spoke briefly with patient's daughter, Fay via phone ([phone]) this afternoon; however she indicated it wasn't a good time and she requested to talk at a later time; agreed to connect tomorrow as available.     Noted per chart, patient was to d/c this afternoon; however this has since been cancelled.   Psych consulted placed; await their evaluation and recommendation.     PITER [facility], [facility]  ICU   Pager: [phone]  Phone: [phone][SR1.1]           Revision History        User Key Date/Time User Provider Type Action    > SR1.1 6/5/2017  4:35 PM Kandace Ring  Sign                  Progress Notes - Physician (Notes for yesterday and today)     No notes of this type exist for this encounter.         Procedure Notes      Procedures by Tamia Zapata MD at 6/4/2017 12:40 AM     Author:  Tamia Zapata MD Service:  Medical ICU Author Type:  Resident    Filed:  6/4/2017 12:41 AM Date of Service:  6/4/2017 12:40 AM Creation Time:  6/4/2017 12:39 AM    Status:  Attested :  Tamia Zapata MD (Resident)    Cosigner:  Brianna Morris MD at 6/4/2017  7:18 PM         Procedure Orders:    1. Insert arterial line [[id number]] ordered by Tamia Zapata MD at 06/04/17 0039            Post-procedure Diagnoses:    1. Unresponsive [R41.89]          Attestation signed by Brianna Morris MD at 6/4/2017  7:18 PM        I was present for the entire procedure.  Diagnosis: vasodilatory shock.    Brianna Morris                                 Procedure/Surgery Information   [facility]  "[facility], [place]    Bedside Procedure Note  Date of Service (when I performed the procedure): 06/04/2017    Jong Galarza is a 67 year old male patient.  No diagnosis found.  No past medical history on file.              SpO2: 94 % O2 Device: Mechanical Ventilator      Insert arterial line  Date/Time: 6/4/2017 12:39 AM  Performed by: KARINA SHEPHERD  Authorized by: KARINA SHEPHERD   Consent: The procedure was performed in an emergent situation. Verbal consent obtained. Written consent obtained.  Risks and benefits: risks, benefits and alternatives were discussed  Consent given by: power of   Patient understanding: patient states understanding of the procedure being performed  Patient consent: the patient's understanding of the procedure matches consent given  Procedure consent: procedure consent matches procedure scheduled  Relevant documents: relevant documents present and verified  Test results: test results available and properly labeled  Site marked: the operative site was marked  Imaging studies: imaging studies available  Required items: required blood products, implants, devices, and special equipment available  Patient identity confirmed: hospital-assigned identification number and anonymous protocol, patient vented/unresponsive  Time out: Immediately prior to procedure a \"time out\" was called to verify the correct patient, procedure, equipment, support staff and site/side marked as required.  Preparation: Patient was prepped and draped in the usual sterile fashion.  Indications: multiple ABGs and hemodynamic monitoring  Location: left radial  Anesthesia: local infiltration    Anesthesia:  Anesthesia: local infiltration  Local Anesthetic: lidocaine 2% without epinephrine   Anesthetic total: 3 mL  Sedation:  Patient sedated: no    Abilio's test normal: yes  Needle gauge: 18  Seldinger technique: Seldinger technique used  Number of attempts: 4  Post-procedure: line " "sutured and dressing applied  Post-procedure CMS: normal  Patient tolerance: Patient tolerated the procedure well with no immediate complications        Tamia Zapata MD  PGY-2, Internal Medicine    Dr. Morris was present and assisted during the procedure.[JS1.1]     Revision History        User Key Date/Time User Provider Type Action    > JS1.1 6/4/2017 12:41 AM Jodi, Tamia Aponte MD Resident Sign                  Progress Notes - Therapies (Notes from 06/03/17 through 06/06/17)     No notes of this type exist for this encounter.                                          INTERAGENCY TRANSFER FORM - LAB / IMAGING / EKG / EMG RESULTS   6/3/2017                       [facility] [facility] BANK: [phone]            Unresulted Labs (24h ago through future)    Start       Ordered    06/06/17 0400  Basic metabolic panel  AM DRAW,   Routine      06/05/17 1755    06/04/17 0400  INR  DAILY,   Routine      06/03/17 2212    Unscheduled  Magnesium  (Magnesium Replacement - Adult - \"High\" - Replacement for all levels less than or equal to 2 mg/dL)  CONDITIONAL (SPECIFY),   Routine     Comments:  Obtain Magnesium Level for these conditions:  *IF no magnesium result within 24 hrs before initiation of order set, draw magnesium level with next lab collect.    *2 HOURS AFTER last magnesium replacement dose when magnesium replacement given for level less than 1.6  *Next morning after magnesium dose.     Repeat Magnesium Replacement if necessary.    06/03/17 2212    Unscheduled  Phosphorus  (POTASSIUM Phosphate - \"High\" - Replacement for all levels less than 2.8 mg/dL )  CONDITIONAL (SPECIFY),   Routine     Comments:  Obtain Phosphorus Level for these conditions:  *IF no phosphorus result within 24 hrs before initiation of order set, draw phosphorus level with next lab collect.    *2 HOURS AFTER last phosphorus replacement dose when phosphorus replacement given for level less than 2.0  *Next morning after phosphorus " dose.     Repeat Phosphorus Replacement if necessary.    06/03/17 2212    Unscheduled  Glucose - Diabetes  CONDITIONAL X 1 STAT,   STAT     Comments:  for changes in mental status, diaphoresis, or unexplained tachycardia    06/04/17 0113         Lab Results - 3 Days      Glucose by meter [[id number]] (Abnormal)  Resulted: 06/05/17 1611, Result status: Final result    Ordering provider: Brianna Morris MD  06/05/17 1606 Resulting lab: POINT OF CARE TEST, GLUCOSE    Specimen Information    Type Source Collected On     06/05/17 1606          Components       Value Reference Range Flag Lab   Glucose 120 70 - 99 mg/dL H 170   Comment:  /RN Notified            Potassium [[id number]]  Resulted: 06/05/17 1154, Result status: Final result    Ordering provider: Tamia Zapata MD  06/05/17 0400 Resulting lab: [facility]    Specimen Information    Type Source Collected On   Blood  06/05/17 1113          Components       Value Reference Range Flag Lab   Potassium 4.1 3.4 - 5.3 mmol/L  51            Sodium [[id number]]  Resulted: 06/05/17 1154, Result status: Final result    Ordering provider: Gama García MD  06/05/17 0935 Resulting lab: [facility]    Specimen Information    Type Source Collected On   Blood  06/05/17 1113          Components       Value Reference Range Flag Lab   Sodium 143 133 - 144 mmol/L  51            Ethylene Glycol [[id number]]  Resulted: 06/05/17 1003, Result status: Final result    Ordering provider: Tamia Zapata MD  06/04/17 0321 Resulting lab: [facility]    Specimen Information    Type Source Collected On   Blood  06/04/17 0635          Components       Value Reference Range Flag Lab   Ethylene Glycol --   51   Result:         NEGATIVE  Reference range: NEGATIVE  Unit: mg/dl     Specimen Type --   51   Result:         Whole Blood  (Note)  Toxic blood or plasma ethylene  glycol  concentrations: greater than 20 mg/dl.    This test was developed and its performance characteristics  determined by [facility]. It has not been cleared or approved  by the Food and Drug Administration.  Analysis performed by [facility], [address] [place] [address]              Alcohol, Methyl [[id number]]  Resulted: 06/05/17 1003, Result status: Final result    Ordering provider: Tamia Zapata MD  06/04/17 0321 Resulting lab: [facility]    Specimen Information    Type Source Collected On   Blood  06/04/17 0635          Components       Value Reference Range Flag Lab   Alcohol, Methyl --   51   Result:         NEGATIVE  Unit: mg/dl  (Note)  METHANOL (METHYL ALCOHOL) CONCENTRATIONS GREATER THAN 3  MG/DL ARE POTENTIALLY TOXIC.    This test was developed and its performance characteristics  determined by [facility].  It has not been cleared or approved  by the Food and Drug Administration.  Analysis performed by [facility], [address] [place] [address]              Glucose by meter [[id number]] (Abnormal)  Resulted: 06/05/17 0930, Result status: Final result    Ordering provider: Brianna Morris MD  06/05/17 0928 Resulting lab: POINT OF CARE TEST, GLUCOSE    Specimen Information    Type Source Collected On     06/05/17 0928          Components       Value Reference Range Flag Lab   Glucose 125 70 - 99 mg/dL H 170            Drug screen comprehensive blood [[id number]] (Abnormal)  Resulted: 06/05/17 0804, Result status: Final result    Ordering provider: Tamia Zapata MD  06/04/17 0347 Resulting lab: [facility]    Specimen Information    Type Source Collected On   Blood  06/04/17 0635          Components       Value Reference Range Flag Lab   Acetaminophen Qual Negative NEG  51   Amobarbital Qual Negative NEG  51   Barbital Qual Negative NEG  51   Butabarbital Qual Negative NEG  51   Butalbital Qual Negative NEG  51    Caffeine Qual Positive NEG A 51   Carbamazepine Qual Negative NEG  51   Carisoprodol Qual Negative NEG  51   Chlorpropamide Qual Negative NEG  51   Ethclorvynol Qual Negative NEG  51   Ethinamate Qual Negative NEG  51   Ethosuximide Qual Negative NEG  51   Ethotoin Qual Negative NEG  51   Glutethimide Qual Negative NEG  51   Ibuprofen Qual Negative NEG  51   Mephenytoin Qual Negative NEG  51   Mephobarbital Qual Negative NEG  51   Meprobamate Qual Negative NEG  51   Methaqualone Qual Negative NEG  51   Metharbital Qual Negative NEG  51   Methsuximide Qual Negative NEG  51   Methyprylon Qual Negative NEG  51   Pentobarbital Qual Negative NEG  51   Phenacetin Qual Negative NEG  51   Phenobarbital Qual Negative NEG  51   Phensuximide Qual Negative NEG  51   Phenytoin Qual Negative NEG  51   Primidone Qual Negative NEG  51   Salicylate Qual Negative NEG  51   Secobarbital Qual Negative NEG  51   Talbutal Qual Negative NEG  51   Theophylline Qual Negative NEG  51   Thiopental Qual Negative NEG  51   Trimethadidone Qual Negative NEG  51   Tybamate Qual Negative NEG  51   Valproic Acid Qual -- NEG  51   Result:         Negative   This test was developed and its performance characteristics determined by the   [facility],  Special [facility] Laboratory. It has   not been cleared or approved by the FDA. The laboratory is regulated under CLIA   as qualified to perform high-complexity testing. This test is used for clinical   purposes. It should not be regarded as investigational or for research.              Hemoglobin A1c [[id number]]  Resulted: 06/05/17 0655, Result status: Final result    Ordering provider: Tamia Zapata MD  06/04/17 2200 Resulting lab: [facility]    Specimen Information    Type Source Collected On   Blood  06/05/17 0354          Components       Value Reference Range Flag Lab   Hemoglobin A1C 5.3 4.3 - 6.0 %  51            Magnesium  [[id number]]  Resulted: 06/05/17 0442, Result status: Final result    Ordering provider: Tamia Zapata MD  06/04/17 2200 Resulting lab: [facility]    Specimen Information    Type Source Collected On   Blood  06/05/17 0354          Components       Value Reference Range Flag Lab   Magnesium 2.1 1.6 - 2.3 mg/dL  51            Phosphorus [[id number]] (Abnormal)  Resulted: 06/05/17 0442, Result status: Final result    Ordering provider: Tamia Zapata MD  06/04/17 2200 Resulting lab: [facility]    Specimen Information    Type Source Collected On   Blood  06/05/17 0354          Components       Value Reference Range Flag Lab   Phosphorus 1.9 2.5 - 4.5 mg/dL L 51            Basic metabolic panel [[id number]] (Abnormal)  Resulted: 06/05/17 0442, Result status: Final result    Ordering provider: Tamia Zapata MD  06/04/17 2200 Resulting lab: [facility]    Specimen Information    Type Source Collected On   Blood  06/05/17 0354          Components       Value Reference Range Flag Lab   Sodium 143 133 - 144 mmol/L  51   Potassium 3.9 3.4 - 5.3 mmol/L  51   Chloride 110 94 - 109 mmol/L H 51   Carbon Dioxide 24 20 - 32 mmol/L  51   Anion Gap 9 3 - 14 mmol/L  51   Glucose 108 70 - 99 mg/dL H 51   Urea Nitrogen 7 7 - 30 mg/dL  51   Creatinine 0.71 0.66 - 1.25 mg/dL  51   GFR Estimate -- >60 mL/min/1.7m2  51   Result:         >90  Non  GFR Calc     GFR Estimate If Black -- >60 mL/min/1.7m2  51   Result:         >90   GFR Calc     Calcium 8.2 8.5 - 10.1 mg/dL L 51   Result:              Glucose by meter [[id number]]  Resulted: 06/05/17 0430, Result status: Final result    Ordering provider: Brianna Morris MD  06/05/17 0426 Resulting lab: POINT OF CARE TEST, GLUCOSE    Specimen Information    Type Source Collected On     06/05/17 0426          Components       Value  Reference Range Flag Lab   Glucose 97 70 - 99 mg/dL  170            INR [[id number]]  Resulted: 06/05/17 0426, Result status: Final result    Ordering provider: Tamia Zapata MD  06/04/17 2200 Resulting lab: [facility]    Specimen Information    Type Source Collected On   Blood  06/05/17 0354          Components       Value Reference Range Flag Lab   INR 0.98 0.86 - 1.14  51            CBC with platelets [[id number]] (Abnormal)  Resulted: 06/05/17 0420, Result status: Final result    Ordering provider: Tamia Zapata MD  06/04/17 2200 Resulting lab: [facility]    Specimen Information    Type Source Collected On   Blood  06/05/17 0354          Components       Value Reference Range Flag Lab   WBC 11.7 4.0 - 11.0 10e9/L H 51   RBC Count 4.15 4.4 - 5.9 10e12/L L 51   Hemoglobin 12.8 13.3 - 17.7 g/dL L 51   Hematocrit 38.8 40.0 - 53.0 % L 51   MCV 94 78 - 100 fl  51   MCH 30.8 26.5 - 33.0 pg  51   MCHC 33.0 31.5 - 36.5 g/dL  51   RDW 13.2 10.0 - 15.0 %  51   Platelet Count 159 150 - 450 10e9/L  51            Calcium ionized whole blood [[id number]]  Resulted: 06/05/17 0418, Result status: Final result    Ordering provider: Gama García MD  06/04/17 2200 Resulting lab: [facility]    Specimen Information    Type Source Collected On   Blood  06/05/17 0354          Components       Value Reference Range Flag Lab   Calcium Ionized Whole Blood 4.9 4.4 - 5.2 mg/dL  51            Glucose by meter [[id number]] (Abnormal)  Resulted: 06/05/17 0005, Result status: Final result    Ordering provider: Brianna Morris MD  06/04/17 2359 Resulting lab: POINT OF CARE TEST, GLUCOSE    Specimen Information    Type Source Collected On     06/04/17 2359          Components       Value Reference Range Flag Lab   Glucose 113 70 - 99 mg/dL H 170            Sodium [[id number]]  Resulted: 06/04/17 2150, Result status: Final  result    Ordering provider: Tamia Zapata MD  06/04/17 1600 Resulting lab: [facility]    Specimen Information    Type Source Collected On   Blood  06/04/17 2105          Components       Value Reference Range Flag Lab   Sodium 144 133 - 144 mmol/L  51            Potassium [[id number]]  Resulted: 06/04/17 2150, Result status: Final result    Ordering provider: Tamia Zapata MD  06/04/17 1600 Resulting lab: [facility]    Specimen Information    Type Source Collected On   Blood  06/04/17 2105          Components       Value Reference Range Flag Lab   Potassium 4.2 3.4 - 5.3 mmol/L  51            Glucose by meter [[id number]] (Abnormal)  Resulted: 06/04/17 1935, Result status: Final result    Ordering provider: Brianna Morris MD  06/04/17 1930 Resulting lab: POINT OF CARE TEST, GLUCOSE    Specimen Information    Type Source Collected On     06/04/17 1930          Components       Value Reference Range Flag Lab   Glucose 100 70 - 99 mg/dL H 170            Magnesium [[id number]]  Resulted: 06/04/17 1705, Result status: Final result    Ordering provider: Tamia Zapata MD  06/04/17 1000 Resulting lab: [facility]    Specimen Information    Type Source Collected On   Blood  06/04/17 1614          Components       Value Reference Range Flag Lab   Magnesium 1.9 1.6 - 2.3 mg/dL  51            Phosphorus [[id number]] (Abnormal)  Resulted: 06/04/17 1705, Result status: Final result    Ordering provider: Tamia Zapata MD  06/04/17 1000 Resulting lab: [facility]    Specimen Information    Type Source Collected On   Blood  06/04/17 1614          Components       Value Reference Range Flag Lab   Phosphorus 2.2 2.5 - 4.5 mg/dL L 51            Basic metabolic panel [[id number]] (Abnormal)  Resulted: 06/04/17 1705, Result status: Final result    Ordering provider:  Tamia Zapata MD  06/04/17 1000 Resulting lab: [facility]    Specimen Information    Type Source Collected On   Blood  06/04/17 1614          Components       Value Reference Range Flag Lab   Sodium 141 133 - 144 mmol/L  51   Potassium 3.8 3.4 - 5.3 mmol/L  51   Chloride 114 94 - 109 mmol/L H 51   Carbon Dioxide 22 20 - 32 mmol/L  51   Anion Gap 6 3 - 14 mmol/L  51   Glucose 111 70 - 99 mg/dL H 51   Urea Nitrogen 10 7 - 30 mg/dL  51   Creatinine 0.84 0.66 - 1.25 mg/dL  51   GFR Estimate -- >60 mL/min/1.7m2  51   Result:         >90  Non  GFR Calc     GFR Estimate If Black -- >60 mL/min/1.7m2  51   Result:         >90   GFR Calc     Calcium 7.6 8.5 - 10.1 mg/dL L 51   Result:              Glucose by meter [[id number]] (Abnormal)  Resulted: 06/04/17 1655, Result status: Final result    Ordering provider: Brianna Morris MD  06/04/17 1617 Resulting lab: POINT OF CARE TEST, GLUCOSE    Specimen Information    Type Source Collected On     06/04/17 1617          Components       Value Reference Range Flag Lab   Glucose 115 70 - 99 mg/dL H 170            CBC with platelets [[id number]] (Abnormal)  Resulted: 06/04/17 1644, Result status: Final result    Ordering provider: Tamia Zapata MD  06/04/17 1000 Resulting lab: [facility]    Specimen Information    Type Source Collected On   Blood  06/04/17 1614          Components       Value Reference Range Flag Lab   WBC 12.4 4.0 - 11.0 10e9/L H 51   RBC Count 3.90 4.4 - 5.9 10e12/L L 51   Hemoglobin 12.2 13.3 - 17.7 g/dL L 51   Hematocrit 35.7 40.0 - 53.0 % L 51   MCV 92 78 - 100 fl  51   MCH 31.3 26.5 - 33.0 pg  51   MCHC 34.2 31.5 - 36.5 g/dL  51   RDW 13.2 10.0 - 15.0 %  51   Platelet Count 155 150 - 450 10e9/L  51            Blood gas arterial [[id number]] (Abnormal)  Resulted: 06/04/17 1629, Result status: Final result    Ordering provider: Tamia Zapata  MD Fay  06/04/17 1613 Resulting lab: [facility]    Specimen Information    Type Source Collected On   Blood  06/04/17 1614          Components       Value Reference Range Flag Lab   pH Arterial 7.33 7.35 - 7.45 pH L 51   pCO2 Arterial 34 35 - 45 mm Hg L 51   pO2 Arterial 91 80 - 105 mm Hg  51   Bicarbonate Arterial 18 21 - 28 mmol/L L 51   Base Deficit Art 7.0 mmol/L  51   Comment:  Reference range:  -9.0 to 1.8   FIO2 30.0   51            Osmolality [[id number]]  Resulted: 06/04/17 1604, Result status: Final result    Ordering provider: Tamia Zapata MD  06/04/17 0635 Resulting lab: [facility]    Specimen Information    Type Source Collected On     06/04/17 0635          Components       Value Reference Range Flag Lab   Osmolality 299 280 - 301 mmol/kg  51            Osmolality [[id number]] (Abnormal)  Resulted: 06/04/17 1421, Result status: Final result    Ordering provider: Tamia Zapata MD  06/04/17 0351 Resulting lab: [facility]    Specimen Information    Type Source Collected On     06/04/17 0351          Components       Value Reference Range Flag Lab   Osmolality 306 280 - 301 mmol/kg H 51            Glucose by meter [[id number]] (Abnormal)  Resulted: 06/04/17 1410, Result status: Final result    Ordering provider: Brianna Morris MD  06/04/17 1407 Resulting lab: POINT OF CARE TEST, GLUCOSE    Specimen Information    Type Source Collected On     06/04/17 1407          Components       Value Reference Range Flag Lab   Glucose 100 70 - 99 mg/dL H 170            Glucose by meter [[id number]]  Resulted: 06/04/17 1410, Result status: Final result    Ordering provider: Brianna Morris MD  06/04/17 1347 Resulting lab: POINT OF CARE TEST, GLUCOSE    Specimen Information    Type Source Collected On     06/04/17 1347          Components       Value Reference Range Flag Lab   Glucose 74 70 -  99 mg/dL  170            Glucose by meter [[id number]]  Resulted: 06/04/17 1325, Result status: Final result    Ordering provider: Brianna Morris MD  06/04/17 1319 Resulting lab: POINT OF CARE TEST, GLUCOSE    Specimen Information    Type Source Collected On     06/04/17 1319          Components       Value Reference Range Flag Lab   Glucose 75 70 - 99 mg/dL  170            Blood gas arterial [[id number]] (Abnormal)  Resulted: 06/04/17 1315, Result status: Final result    Ordering provider: Gama García MD  06/04/17 1214 Resulting lab: [facility]    Specimen Information    Type Source Collected On   Blood  06/04/17 1253          Components       Value Reference Range Flag Lab   pH Arterial 7.47 7.35 - 7.45 pH H 51   pCO2 Arterial 23 35 - 45 mm Hg L 51   pO2 Arterial 138 80 - 105 mm Hg H 51   Bicarbonate Arterial 16 21 - 28 mmol/L L 51   Base Deficit Art 6.4 mmol/L  51   Comment:  Reference range:  -9.0 to 1.8   FIO2 45.0   51            Glucose by meter [[id number]]  Resulted: 06/04/17 1245, Result status: Final result    Ordering provider: Brianna Morris MD  06/04/17 1242 Resulting lab: POINT OF CARE TEST, GLUCOSE    Specimen Information    Type Source Collected On     06/04/17 1242          Components       Value Reference Range Flag Lab   Glucose 73 70 - 99 mg/dL  170            Glucose by meter [[id number]]  Resulted: 06/04/17 1211, Result status: Final result    Ordering provider: Brianna Morris MD  06/04/17 1208 Resulting lab: POINT OF CARE TEST, GLUCOSE    Specimen Information    Type Source Collected On     06/04/17 1208          Components       Value Reference Range Flag Lab   Glucose 79 70 - 99 mg/dL  170            Glucose by meter [[id number]] (Abnormal)  Resulted: 06/04/17 1110, Result status: Final result    Ordering provider: Brianna Morris MD  06/04/17 1105 Resulting lab: POINT OF CARE TEST, GLUCOSE    Specimen Information    Type Source  Collected On     06/04/17 1105          Components       Value Reference Range Flag Lab   Glucose 109 70 - 99 mg/dL H 170            Sodium [[id number]]  Resulted: 06/04/17 1052, Result status: Final result    Ordering provider: Tamia Zapata MD  06/04/17 0400 Resulting lab: [facility]    Specimen Information    Type Source Collected On   Blood  06/04/17 1006          Components       Value Reference Range Flag Lab   Sodium 142 133 - 144 mmol/L  51            Potassium [[id number]]  Resulted: 06/04/17 1052, Result status: Final result    Ordering provider: Tamia Zapata MD  06/04/17 0400 Resulting lab: [facility]    Specimen Information    Type Source Collected On   Blood  06/04/17 1006          Components       Value Reference Range Flag Lab   Potassium 4.2 3.4 - 5.3 mmol/L  51            Lactic acid whole blood [[id number]]  Resulted: 06/04/17 1033, Result status: Final result    Ordering provider: Tamia Zapata MD  06/04/17 0400 Resulting lab: [facility]    Specimen Information    Type Source Collected On   Blood  06/04/17 1006          Components       Value Reference Range Flag Lab   Lactic Acid 1.7 0.7 - 2.1 mmol/L  51            Calcium ionized whole blood [[id number]] (Abnormal)  Resulted: 06/04/17 1033, Result status: Final result    Ordering provider: Tamia Zapata MD  06/04/17 0400 Resulting lab: [facility]    Specimen Information    Type Source Collected On   Blood  06/04/17 1006          Components       Value Reference Range Flag Lab   Calcium Ionized Whole Blood 4.3 4.4 - 5.2 mg/dL L 51            Glucose by meter [[id number]] (Abnormal)  Resulted: 06/04/17 1015, Result status: Final result    Ordering provider: Brianna Morris MD  06/04/17 1009 Resulting lab: POINT OF CARE TEST, GLUCOSE    Specimen Information    Type Source  Collected On     06/04/17 1009          Components       Value Reference Range Flag Lab   Glucose 131 70 - 99 mg/dL H 170            Glucose by meter [[id number]] (Abnormal)  Resulted: 06/04/17 0831, Result status: Final result    Ordering provider: Brianna Morris MD  06/04/17 0821 Resulting lab: POINT OF CARE TEST, GLUCOSE    Specimen Information    Type Source Collected On     06/04/17 0821          Components       Value Reference Range Flag Lab   Glucose 155 70 - 99 mg/dL H 170            Ketone Beta-Hydroxybutyrate Quantitative [[id number]]  Resulted: 06/04/17 0741, Result status: Final result    Ordering provider: Tamia Zapata MD  06/04/17 0323 Resulting lab: [facility]    Specimen Information    Type Source Collected On   Blood  06/04/17 0635          Components       Value Reference Range Flag Lab   Ketone Quantitative 0.1 0.0 - 0.6 mmol/L  51            Glucose by meter [[id number]] (Abnormal)  Resulted: 06/04/17 0625, Result status: Final result    Ordering provider: Brianna Morris MD  06/04/17 0620 Resulting lab: POINT OF CARE TEST, GLUCOSE    Specimen Information    Type Source Collected On     06/04/17 0620          Components       Value Reference Range Flag Lab   Glucose 171 70 - 99 mg/dL H 170            Methicillin Resistant Staph Aureus PCR [[id number]]  Resulted: 06/04/17 0539, Result status: Final result    Ordering provider: Tamia Zapata MD  06/03/17 2214 Resulting lab: [facility]    Specimen Information    Type Source Collected On   Nasal Swab  06/04/17 0142          Components       Value Reference Range Flag Lab   Specimen Description Nares   51   S Aur Meth Resis PCR -- NEG  75   Result:         Negative  MRSA Negative: SA Negative  MRSA and Staphylococcus aureus target DNA not   detected, presumed negative for MRSA and SA colonization or the number of   bacteria present may be below the limit of  detection for the assay. FDA   approved assay performed using [employer] GeneXpert(R) real-time PCR.              Glucose by meter [[id number]] (Abnormal)  Resulted: 06/04/17 0456, Result status: Final result    Ordering provider: Brianna Morris MD  06/04/17 0452 Resulting lab: POINT OF CARE TEST, GLUCOSE    Specimen Information    Type Source Collected On     06/04/17 0452          Components       Value Reference Range Flag Lab   Glucose 193 70 - 99 mg/dL H 170            Magnesium [[id number]]  Resulted: 06/04/17 0445, Result status: Final result    Ordering provider: Tamia Zapata MD  06/03/17 2213 Resulting lab: [facility]    Specimen Information    Type Source Collected On   Blood  06/04/17 0351          Components       Value Reference Range Flag Lab   Magnesium 1.8 1.6 - 2.3 mg/dL  51            Phosphorus [[id number]] (Abnormal)  Resulted: 06/04/17 0445, Result status: Final result    Ordering provider: Tamia Zapata MD  06/03/17 2213 Resulting lab: [facility]    Specimen Information    Type Source Collected On   Blood  06/04/17 0351          Components       Value Reference Range Flag Lab   Phosphorus 1.6 2.5 - 4.5 mg/dL L 51            Comprehensive metabolic panel [[id number]] (Abnormal)  Resulted: 06/04/17 0445, Result status: Final result    Ordering provider: Tamia Zapata MD  06/03/17 2213 Resulting lab: [facility]    Specimen Information    Type Source Collected On   Blood  06/04/17 0351          Components       Value Reference Range Flag Lab   Sodium 147 133 - 144 mmol/L H 51   Potassium 3.9 3.4 - 5.3 mmol/L  51   Chloride 118 94 - 109 mmol/L H 51   Carbon Dioxide 12 20 - 32 mmol/L L 51   Anion Gap 16 3 - 14 mmol/L H 51   Glucose 219 70 - 99 mg/dL H 51   Urea Nitrogen 12 7 - 30 mg/dL  51   Creatinine 0.85 0.66 - 1.25 mg/dL  51   GFR Estimate -- >60 mL/min/1.7m2  51    Result:         >90  Non  GFR Calc     GFR Estimate If Black -- >60 mL/min/1.7m2  51   Result:         >90   GFR Calc     Calcium 7.4 8.5 - 10.1 mg/dL L 51   Result:     Bilirubin Total 0.3 0.2 - 1.3 mg/dL  51   Albumin 2.6 3.4 - 5.0 g/dL L 51   Protein Total 5.2 6.8 - 8.8 g/dL L 51   Alkaline Phosphatase 53 40 - 150 U/L  51   ALT 25 0 - 70 U/L  51   AST 21 0 - 45 U/L  51            CBC with platelets [[id number]] (Abnormal)  Resulted: 06/04/17 0435, Result status: Final result    Ordering provider: Tamia Zapata MD  06/03/17 2213 Resulting lab: [facility]    Specimen Information    Type Source Collected On   Blood  06/04/17 0351          Components       Value Reference Range Flag Lab   WBC 25.5 4.0 - 11.0 10e9/L H 51   RBC Count 4.18 4.4 - 5.9 10e12/L L 51   Hemoglobin 13.1 13.3 - 17.7 g/dL L 51   Hematocrit 38.7 40.0 - 53.0 % L 51   MCV 93 78 - 100 fl  51   MCH 31.3 26.5 - 33.0 pg  51   MCHC 33.9 31.5 - 36.5 g/dL  51   RDW 12.9 10.0 - 15.0 %  51   Platelet Count 217 150 - 450 10e9/L  51            INR [[id number]] (Abnormal)  Resulted: 06/04/17 0425, Result status: Final result    Ordering provider: Tamia Zapata MD  06/03/17 2213 Resulting lab: [facility]    Specimen Information    Type Source Collected On   Blood  06/04/17 0351          Components       Value Reference Range Flag Lab   INR 1.18 0.86 - 1.14 H 51            Glucose by meter [[id number]] (Abnormal)  Resulted: 06/04/17 0416, Result status: Final result    Ordering provider: Brianna Morris MD  06/04/17 0412 Resulting lab: POINT OF CARE TEST, GLUCOSE    Specimen Information    Type Source Collected On     06/04/17 0412          Components       Value Reference Range Flag Lab   Glucose 214 70 - 99 mg/dL H 170            Lactic acid whole blood [[id number]] (Abnormal)  Resulted: 06/04/17 0400, Result status: Final result    Ordering  provider: Tamia Zapata MD  06/03/17 2213 Resulting lab: [facility]    Specimen Information    Type Source Collected On   Blood  06/04/17 0351          Components       Value Reference Range Flag Lab   Lactic Acid 8.1 0.7 - 2.1 mmol/L HH 51   Comment:         Critical Value called to and read back by  RAHUL IGLESIAS UEDGARD ON 06/04/17 @ 0359 BY DT              Calcium ionized whole blood [[id number]]  Resulted: 06/04/17 0400, Result status: Final result    Ordering provider: Tamia Zapata MD  06/03/17 2213 Resulting lab: [facility]    Specimen Information    Type Source Collected On   Blood  06/04/17 0351          Components       Value Reference Range Flag Lab   Calcium Ionized Whole Blood 4.5 4.4 - 5.2 mg/dL  51            Blood gas arterial [[id number]] (Abnormal)  Resulted: 06/04/17 0400, Result status: Final result    Ordering provider: Tamia Zapata MD  06/04/17 0126 Resulting lab: [facility]    Specimen Information    Type Source Collected On   Blood  06/04/17 0351          Components       Value Reference Range Flag Lab   pH Arterial 7.20 7.35 - 7.45 pH L 51   pCO2 Arterial 30 35 - 45 mm Hg L 51   pO2 Arterial 92 80 - 105 mm Hg  51   Bicarbonate Arterial 12 21 - 28 mmol/L L 51   Base Deficit Art 15.2 mmol/L  51   Comment:  Abnormal Result, Ref range: -9.0 to 1.8   FIO2 50   51            Glucose by meter [[id number]] (Abnormal)  Resulted: 06/04/17 0216, Result status: Final result    Ordering provider: Brianna Morris MD  06/04/17 0209 Resulting lab: POINT OF CARE TEST, GLUCOSE    Specimen Information    Type Source Collected On     06/04/17 0209          Components       Value Reference Range Flag Lab   Glucose 178 70 - 99 mg/dL H 170            Blood gas arterial and oxyhgb [[id number]] (Abnormal)  Resulted: 06/04/17 0122, Result status: Final result    Ordering provider:  Tamia Zapata MD  06/03/17 2213 Resulting lab: [facility]    Specimen Information    Type Source Collected On   Blood  06/04/17 0102          Components       Value Reference Range Flag Lab   pH Arterial 7.17 7.35 - 7.45 pH LL 51   Comment:         Critical Value called to and read back by  RAHUL PEOPLES UEDGARD ON 06/04/17 @ 0121 BY DT     pCO2 Arterial 33 35 - 45 mm Hg L 51   pO2 Arterial 113 80 - 105 mm Hg H 51   Bicarbonate Arterial 12 21 - 28 mmol/L L 51   FIO2 60.0   51   Oxyhemoglobin Arterial 96 92 - 100 %  51   Base Deficit Art 15.2 mmol/L  51   Comment:  Abnormal Result, Ref range: -9.0 to 1.8            Lactic acid whole blood [[id number]]  Resulted: 06/04/17 0121, Result status: In process    Ordering provider: Tamia Zapata MD  06/04/17 0102 Resulting lab: [facility]    Specimen Information    Type Source Collected On     06/04/17 0102            Procalcitonin [[id number]]  Resulted: 06/04/17 0023, Result status: Final result    Ordering provider: Tamia Zapata MD  06/03/17 2213 Resulting lab: [facility]    Specimen Information    Type Source Collected On   Blood  06/03/17 2234          Components       Value Reference Range Flag Lab   Procalcitonin -- ng/ml  51   Result:         <0.05  <0.05 ng/ml  Normal  Recommendation: Very low risk of bacterial infection.   Discourage antibiotics unless strong clinical suspicion for serious infection.              Acetaminophen level [[id number]]  Resulted: 06/03/17 2314, Result status: Final result    Ordering provider: Tamia Zapata MD  06/03/17 2213 Resulting lab: [facility]    Specimen Information    Type Source Collected On   Blood  06/03/17 2234          Components       Value Reference Range Flag Lab   Acetaminophen Level 6 mg/L  51   Comment:  Therapeutic range: 10-20 mg/L            Phosphorus [[id number]] (Abnormal)  Resulted:  06/03/17 2312, Result status: Final result    Ordering provider: Tamia Zapata MD  06/03/17 2213 Resulting lab: [facility]    Specimen Information    Type Source Collected On   Blood  06/03/17 2234          Components       Value Reference Range Flag Lab   Phosphorus 1.7 2.5 - 4.5 mg/dL L 51            Troponin I [[id number]]  Resulted: 06/03/17 2312, Result status: Final result    Ordering provider: Tamia Zapata MD  06/03/17 2213 Resulting lab: [facility]    Specimen Information    Type Source Collected On   Blood  06/03/17 2234          Components       Value Reference Range Flag Lab   Troponin I ES -- 0.000 - 0.045 ug/L  51   Result:         <0.015  The 99th percentile for upper reference range is 0.045 ug/L.  Troponin values in   the range of 0.045 - 0.120 ug/L may be associated with risks of adverse   clinical events.              Basic metabolic panel [[id number]] (Abnormal)  Resulted: 06/03/17 2312, Result status: Final result    Ordering provider: Tamia Zapata MD  06/03/17 2213 Resulting lab: [facility]    Specimen Information    Type Source Collected On   Blood  06/03/17 2234          Components       Value Reference Range Flag Lab   Sodium 147 133 - 144 mmol/L H 51   Potassium 3.2 3.4 - 5.3 mmol/L L 51   Chloride 118 94 - 109 mmol/L H 51   Carbon Dioxide 17 20 - 32 mmol/L L 51   Anion Gap 13 3 - 14 mmol/L  51   Glucose 274 70 - 99 mg/dL H 51   Urea Nitrogen 14 7 - 30 mg/dL  51   Creatinine 1.12 0.66 - 1.25 mg/dL  51   GFR Estimate 66 >60 mL/min/1.7m2  51   Comment:  Non  GFR Calc   GFR Estimate If Black 79 >60 mL/min/1.7m2  51   Comment:  African American GFR Calc   Calcium 7.0 8.5 - 10.1 mg/dL L 51            Magnesium [[id number]] (Abnormal)  Resulted: 06/03/17 2312, Result status: Final result    Ordering provider: Tamia Zapata MD  06/03/17 2213  Resulting lab: [facility]    Specimen Information    Type Source Collected On   Blood  06/03/17 2234          Components       Value Reference Range Flag Lab   Magnesium 2.4 1.6 - 2.3 mg/dL H 51            CK total [[id number]]  Resulted: 06/03/17 2312, Result status: Final result    Ordering provider: Tamia Zapata MD  06/03/17 2213 Resulting lab: [facility]    Specimen Information    Type Source Collected On   Blood  06/03/17 2234          Components       Value Reference Range Flag Lab   CK Total 87 30 - 300 U/L  51            Ammonia [[id number]]  Resulted: 06/03/17 2303, Result status: Final result    Ordering provider: Tamia Zapata MD  06/03/17 2213 Resulting lab: [facility]    Specimen Information    Type Source Collected On   Blood  06/03/17 2234          Components       Value Reference Range Flag Lab   Ammonia 15 10 - 50 umol/L  51            CBC with platelets [[id number]] (Abnormal)  Resulted: 06/03/17 2251, Result status: Final result    Ordering provider: Tamia Zapata MD  06/03/17 2213 Resulting lab: [facility]    Specimen Information    Type Source Collected On   Blood  06/03/17 2234          Components       Value Reference Range Flag Lab   WBC 21.6 4.0 - 11.0 10e9/L H 51   RBC Count 4.21 4.4 - 5.9 10e12/L L 51   Hemoglobin 13.3 13.3 - 17.7 g/dL  51   Hematocrit 38.9 40.0 - 53.0 % L 51   MCV 92 78 - 100 fl  51   MCH 31.6 26.5 - 33.0 pg  51   MCHC 34.2 31.5 - 36.5 g/dL  51   RDW 12.9 10.0 - 15.0 %  51   Platelet Count 201 150 - 450 10e9/L  51            Glucose by meter [[id number]] (Abnormal)  Resulted: 06/03/17 2249, Result status: Final result    Ordering provider: Brianna Morris MD  06/03/17 2241 Resulting lab: POINT OF CARE TEST, GLUCOSE    Specimen Information    Type Source Collected On     06/03/17 2241          Components        Value Reference Range Flag Lab   Glucose 263 70 - 99 mg/dL H 170            Calcium ionized whole blood [[id number]]  Resulted: 06/03/17 2245, Result status: Final result    Ordering provider: Tamia Zapata MD  06/03/17 2213 Resulting lab: [facility]    Specimen Information    Type Source Collected On   Blood  06/03/17 2234          Components       Value Reference Range Flag Lab   Calcium Ionized Whole Blood 4.4 4.4 - 5.2 mg/dL  51            Lactic acid whole blood [[id number]] (Abnormal)  Resulted: 06/03/17 2245, Result status: Final result    Ordering provider: Tamia Zapata MD  06/03/17 2213 Resulting lab: [facility]    Specimen Information    Type Source Collected On   Blood  06/03/17 2234          Components       Value Reference Range Flag Lab   Lactic Acid 5.9 0.7 - 2.1 mmol/L HH 51   Comment:         Critical Value called to and read back by  TUYET DE LEONON  6/3/17,2245 BY FH              Testing Performed By     Lab - Abbreviation Name Director Address Valid Date Range    45 - CQV682 [facility] Unknown Unknown 01/28/02 0000 - Present    51 - Unknown [facility] Unknown [address] [facility] [address] 12/31/14 1010 - Present    75 - Unknown [facility] Unknown [address] [facility] [address] 01/15/15 1019 - Present    170 - Unknown POINT OF CARE TEST, GLUCOSE Unknown Unknown 10/31/11 1114 - Present               Imaging Results - 3 Days      XR Chest Port 1 View [[id number]]  Resulted: 06/04/17 0726, Result status: Final result    Ordering provider: Tamia Zapata MD  06/03/17 2213 Resulted by: Sonny Liz MD Dahi, Farid, MD    Performed: 06/03/17 2218 - 06/03/17 2250 Resulting lab: RADIOLOGY RESULTS    Narrative:       EXAMINATION: XR CHEST PORT 1 VW  6/3/2017 10:50 PM      CLINICAL HISTORY: Endotracheal tube  positioning    COMPARISON: Same date earlier radiograph        FINDINGS:  Interval retraction of endotracheal tube with the tip projecting 3.5  cm above arlene. Placement of OG/NG feeding tube with the tip and  sidehole projecting over the stomach.    Cardiac silhouette within normal limits. Mild left retrocardiac  atelectasis. No pleural effusion or pneumothorax. Changes of  cholecystectomy in the upper abdomen.        Impression:       IMPRESSION:  1. Interval retraction of endotracheal tube with the tip projecting  3.5 cm above the arlene.  2. Placement of OG/NG feeding tube with the tip and sidehole  projecting over the stomach.  3. Mild left retrocardiac atelectasis. Otherwise, no acute airspace  disease.    I have personally reviewed the examination and initial interpretation  and I agree with the findings.    PANCHO SOLOMON      Testing Performed By     Lab - Abbreviation Name Director Address Valid Date Range    104 - Rad Rslts RADIOLOGY RESULTS Unknown Unknown 05 1553 - Present               ECG/EMG Results      Echocardiogram Complete [[id number]]  Resulted: 17 0846, Result status: Edited Result - FINAL    Ordering provider: Karina Zapata MD  17 0114 Resulted by: Jam Prince MD    Performed: 17 0930 - 17 0930 Resulting lab: RADIOLOGY RESULTS    Narrative:       [id number]  [id number]  [id number]  [id number]^JOAO^KARINA^LUCIAN           [facility]  Echocardiography Laboratory  [address] [place] [id number]     Name: FABIO BERKOWITZ  MRN: [id number]  : 1950  Study Date: 2017 08:46 AM  Age: 67 yrs  Gender: Male  [place] [facility] [place] For Study: Cardiac Arrest, Cardiorespiratory Failure  Ordering Physician: KARINA ZAPATA  Referring Physician: SELF, REFERRED  Performed By: DNAA Ruiz     BSA: 1.9 m2  Height: 69 in  Weight: 166 lb  BP: 101/53  mmHg  _____________________________________________________________________________  __        Procedure  Echocardiogram with two-dimensional, color and spectral Doppler performed.  _____________________________________________________________________________  __        Interpretation Summary  Global and regional left ventricular function is normal with an EF of 55-60%.  _____________________________________________________________________________  __        Left Ventricle  Global and regional left ventricular function is normal with an EF of 55-60%.  Left ventricular wall thickness is normal. Left ventricular size is normal.     Right Ventricle  The right ventricle is normal size. Global right ventricular function is  normal.     Atria  Both atria appear normal.     Mitral Valve  The mitral valve is normal. Trace mitral insufficiency is present.        Aortic Valve  Aortic valve is normal in structure and function.     Tricuspid Valve  The tricuspid valve is normal. Trace tricuspid insufficiency is present.     Pulmonic Valve  The pulmonic valve is normal.     Vessels  The aorta root is normal. The IVC measures 2.3 cm in size.     Pericardium  No pericardial effusion is present.     _____________________________________________________________________________  __  MMode/2D Measurements & Calculations  RVDd: 3.1 cm  IVSd: 1.1 cm  LVIDd: 4.1 cm  LVIDs: 2.5 cm  LVPWd: 1.1 cm  FS: 40.1 %  EDV(Teich): 75.5 ml  ESV(Teich): 21.8 ml     LV mass(C)d: 145.3 grams  LV mass(C)dI: 76.1 grams/m2  Ao root diam: 3.6 cm  asc Aorta Diam: 3.5 cm  LA/Ao: 0.89  LVOT diam: 2.3 cm  LVOT area: 4.2 cm2  LA Volume (BP): 56.8 ml  TAPSE: 1.8 cm        Doppler Measurements & Calculations  MV E max erika: 72.6 cm/sec  MV A max erika: 84.5 cm/sec  MV E/A: 0.86  MV dec slope: 404.3 cm/sec2  MV dec time: 0.18 sec  Lateral E/e': 11.3  Medial E/e': 11.6              _____________________________________________________________________________  __         Report approved by: David Valadez 06/04/2017 10:26 AM       1    Type Source Collected On     06/04/17 0846          View Image (below)              Encounter-Level Documents:     There are no encounter-level documents.      Order-Level Documents:     There are no order-level documents.

## 2017-06-03 NOTE — IP AVS SNAPSHOT
[address] [address]    Phone:  [phone]                                       After Visit Summary   6/3/2017    Jong Galarza    MRN: [id number]           After Visit Summary Signature Page     I have received my discharge instructions, and my questions have been answered. I have discussed any challenges I see with this plan with the nurse or doctor.    ..........................................................................................................................................  Patient/Patient Representative Signature      ..........................................................................................................................................  Patient Representative Print Name and Relationship to Patient    ..................................................               ................................................  Date                                            Time    ..........................................................................................................................................  Reviewed by Signature/Title    ...................................................              ..............................................  Date                                                            Time

## 2017-06-03 NOTE — ED PROVIDER NOTES
"  History     Chief Complaint:  Unresponsive    HPI : History limited to EMS report due to patient's unresponsiveness.  Jong Snow is a 67 year old male who presents via EMS unresponsive. The patient was last seen normal by his daughter at 2:00 PM today. She returned around 5:00 PM, and found him unresponsive on his chair. EMS was called to the scene. First blood pressure upon their arrival was 64/40, and heart rate was in the 50's. His blood sugar was 188. There was no evidence of trauma. He was having significant trouble breathing, was unresponsive to EMS, and positive pressure ventilation was initiated. EMS did not initiate compressions. 4 mg of narcan was given with no response. Most recent blood pressure by /66.     Per EMS: Daughter reports no history of heart problems before. Daughter reports he has been on a lot of medications. She states he is a recovering alcoholic, though she doesn't think he drank recently.     Allergies:  The patient has no known drug allergies.     Medications:    Aciphex  Lipitor  Ativan  Desyrel  Effexor  Viagra    Past Medical History:    Depression  Alcohol abuse  HLD    Past Surgical History:    History reviewed.  No significant past surgical history.    Family History:   History reviewed.  No significant family history    Social History:  Relationship status:   Alcohol use: Recovering alcoholic  The patient presents via EMS.     Review of Systems   Unable to perform ROS: Acuity of condition       Physical Exam   First Vitals:  Patient Vitals for the past 24 hrs:   BP Temp Heart Rate Resp SpO2 Height Weight   06/03/17 2102 - 94.6  F (34.8  C) - - - - -   06/03/17 2058 - 94.8  F (34.9  C) 102 12 100 % - -   06/03/17 2050 101/53 95.2  F (35.1  C) 101 17 100 % - -   06/03/17 2045 98/51 - 101 17 100 % - -   06/03/17 2040 95/52 95.2  F (35.1  C) 101 17 100 % 1.753 m (5' 9\") -   06/03/17 2035 96/51 95.2  F (35.1  C) 101 17 100 % - -   06/03/17 2030 95/51 95.2  F " (35.1  C) 101 17 100 % - -   06/03/17 2025 91/48 95.2  F (35.1  C) 100 18 100 % - -   06/03/17 2021 - 95.2  F (35.1  C) 102 19 100 % - -   06/03/17 2020 95/54 - 101 21 100 % - -   06/03/17 2015 90/53 - 101 18 100 % - -   06/03/17 2006 - 95.2  F (35.1  C) 105 19 98 % - -   06/03/17 2005 96/54 95.2  F (35.1  C) 102 17 100 % - -   06/03/17 2000 99/57 95.2  F (35.1  C) 105 18 100 % - -   06/03/17 1957 - 95.4  F (35.2  C) 105 17 100 % - -   06/03/17 1955 96/54 - 100 25 100 % - -   06/03/17 1950 95/56 - 93 14 100 % - -   06/03/17 1945 93/56 - 90 15 100 % - -   06/03/17 1940 94/57 - 90 17 100 % - -   06/03/17 1935 97/58 - 90 11 95 % - -   06/03/17 1922 109/66 - - - - - -   06/03/17 1910 103/65 95.5  F (35.3  C) 83 (!) 130 96 % - -   06/03/17 1905 (!) 84/56 95.4  F (35.2  C) 84 13 95 % - -   06/03/17 1900 95/59 - 90 16 96 % - -   06/03/17 1855 (!) 86/53 - 93 16 96 % - -   06/03/17 1852 - - - - 98 % - -   06/03/17 1850 114/65 - 105 17 98 % - -   06/03/17 1844 156/84 - 105 16 100 % - -   06/03/17 1840 (!) 76/50 - 86 16 98 % - -   06/03/17 1837 (!) 79/50 - 94 16 98 % - -   06/03/17 1834 (!) 85/52 - 109 17 98 % - -   06/03/17 1830 125/72 - 116 17 99 % - -   06/03/17 1827 158/85 - 123 16 99 % - -   06/03/17 1824 (!) 199/121 - 122 20 95 % - -   06/03/17 1822 (!) 154/108 - 76 (!) 81 92 % - -   06/03/17 1819 - - 73 (!) 63 90 % - -   06/03/17 1814 (!) 45/30 - 70 25 96 % - 79.4 kg (175 lb)   06/03/17 1812 (!) 50/29 - 70 26 95 % - -   06/03/17 1810 (!) 46/31 - - (!) 46 94 % - -   06/03/17 1806 - - - - 97 % - -   06/03/17 1805 (!) 77/52 - - - - - -   06/03/17 1800 - - - - 99 % - -       Physical Exam  General: Patient in severe respiratory distress. Unresponsive. No motor movement. No evidence of trauma.  Head:  Scalp is NC/AT  Eyes:  No scleral icterus, pupils 3 mm and sluggish.   ENT:  The external nose and ears are normal. The oropharynx is normal and without erythema; mucus membranes are moist.   CV:  Regular rate and  rhythm    Resp:  Breath sounds are clear bilaterally. Patient actively being bagged on arrival.    No respiratory effort.  GI:  Abdomen is soft, no distension.  MS:  No lower extremity edema   Skin:  Cool and dry, No rash or lesions noted.  Neuro: Unresponsive. GCS 3. No response to pain; no corneal reflex      Emergency Department Course   ECG (18:26:17):  Indication: Unresponsive.   Rate 123 bpm. CT interval 164. QRS duration 88. QT/QTc 316/452. P-R-T axes 26.   Interpretation: Sinus tachycardia. Nonspecific T wave abnormality.  Agree with computer interpretation.  Interpreted at 1830 by Dr. Osorio.    Imaging:  Radiographic findings were communicated with the patient who voiced understanding of the findings.    Head CT, without contrast, per radiology:   Cerebral atrophy. No evidence for intracranial hemorrhage or any acute process.    Cervical spine CT, without contrast, per radiology:   Degenerative changes. No evidence for fracture or any posterior malalignment.    Portable Chest XR, per radiology:   The patient is intubated. The tip of ET tube is low being 0.5 cm above the arlene and projecting towards the right mainstem bronchus. There is some prominence of the mediastinal region but this  is probably due to portable technique. No infiltrates are present. Heart size is borderline large but this also could be due to technique. Results called to referring physician.    Laboratory:  1949: ISTAT gases arterial POCT: pH 7.14 (LL), pCO2 46 (H), pO2 102, HCO3 16 (L)   CBC: WBC 6.0, HGB 11.6 (L),   CMP: Na 149 (H), Potassium 2.8 (L), Chloride 122 (H), CO2 14 (L), Glucose 126 (H), Calcium 5.8 (LL), Albumin 2.2 (L), Protein 4.0 (L), o/w WNL (Creatinine 0.96)  1811: Troponin I: <0.015  Lactate: 3.7 (H)  Lipase: 214  Magnesium: 1.6  1811: INR: 1.13  1915: Drug abuse screen: Negative  1811: Blood alcohol: 0.15 (H)  Acetaminophen: 12  Salicylate: <2  UA: Clear, yellow urine: Albumin 30 (A), Mucus present (A), o/w  "WNL  Blood culture 1: Pending  Blood culture 2: Pending    Procedures:     Intubation      INDICATION: Acute respiratory failure. Unresponsive    PERFORMED BY: Dr. Osorio    CONSENT: The procedure was performed in an emergent situation.    TIMEOUT: Immediately prior to procedure a \"time out\" was called to verify the correct patient, procedure, equipment, support staff and site/side marked as required.    INTUBATION METHOD: Fiberoptic direct CMAC #4 blade     PATIENT STATUS: Unconscious    PREOXYGENATION: Mask    PRETREATMENT MEDICATIONS: None    SEDATIVES: none    PARALYTIC: rocuronium    LARYNGOSCOPE SIZE: Mac 4    TUBE SIZE: 7.5 cuffed with cuff inflated after placement  Number of attempts: 1  Cricoid pressure: yes  Cords visualized: yes    POST-PROCEDURE ASSESSMENT: Breath sounds equal bilaterally with chest rise and absent over the epigastrium, Chest x-ray interpreted by me demonstrating endotracheal tube in appropriate position and CO2 detector.    ETT TO TEETH: 25 cm. Pulled back to 23 cm following chest XR.   Tube secured with: ETT ortiz    Patient tolerated the procedure well with no immediate complications.  COMPLICATIONS:  None        Central Line Placement       PROCEDURE:  Central Line Placement with Ultrasound Guidance.    INDICATIONS: Vascular access    CONSENT: Emergent procedure    TIMEOUT: Universal protocol was followed. TIME OUT conducted just prior to starting procedure confirmed patient identity, site/side, procedure, patient position, and availability of correct equipment, and implants.      MEDICATION: Lidocaine 2 cc    PROCEDURAL NOTE: Right Femoral and the right femoral area was prepped, cleansed and draped in a sterile fashion.  Mask, gown and gloves were used per sterile protocol.  Patient was then placed into Trendelenburg position and lidocaine was used for local anesthesia.  Landmarks were identified and Vascular probe with ultrasound was used in a sterile fashion for guidance.  " Introducer needle was then used to gain access to the central venous circulation.  Using Seldinger technique the  Triple lumen catheter was placed.  Catheter port(s) were aspirated and flushed.  Central line was sutured in place and sterile dressing applied.    PATIENT STATUS: Patient tolerated the procedure   well. There were  no complications.    Interventions:  1810: Normal Saline, 2000 mL, IV   1819: Levophed, 0.05 mcg/kg/hr, IV  1821: Levophed rate change, 0.2 mcg/kg/hr, IV  1826: Levophed rate change, 0.05 mcg/kg/hr, IV  1829: Levophed rate change, 0.1 mcg/kg/hr, IV  1831: Levophed rate change, 0.2 mcg/kg/hr, IV  1841: Levophed rate change, 0.8 mcg/kg/hr, IV  1901: Levophed rate change, 0.9 mcg/kg/hr, IV  1911: Epinephrine infusion, 0.03 mcg/kg/min, IV  1943: Epinephrine rate change, 0.08 mcg/kg/min, IV  1948: Calcium gluconate, 1 g, IV  1949: Potassium chloride, 20 mEq, IV  1953: Magnesium sulfate, 2 g, IV  2003: Zosyn, 3.375 g, IV  2013: Epinephrine rate change, 0.11 mcg/kg/min  2014: Levophed rate change, 0.8 mcg/kg/min    Emergency Department Course:  Nursing notes and vitals reviewed.  I performed an exam of the patient as documented above.  The above workup was undertaken.  1804: Patient transferred to ED bed.   1804: Positive pressure ventilation was maintained.  1806: I examined the patient at bedside.  1809: Blood drawn.  1810: The patient was given 2000 mL of normal saline.  1813: Vitals:  HR: 71  O2: 96  BP: 50/29  1816: Chest compressions were begun.  1818: The Johnathan machine was placed.  1819: A levophed drip was begun.  1822: Johnathan machine begun again.  1822: Ultrasound of patients femoral vein.  1822: The patient was given 1 mg of epinephrine.  1824: Vitals:  HR: 123 O2: 92  BP: 154/108  1825: The patient was intubated as documented above.   1831: I performed the central line procedure described above.  1835: Vitals:  HR: 100 O2: 98  BP: 85/52  1842: The patient was given 0.5 mg of  epinephrine.  1846: Vitals:  HR: 111 O2: 99  BP: 156/84  1857: A portable chest X-ray was performed.   1858: A puentes catheter was placed.  1939: I discussed the patient with Dr. Pathak of radiology.  1956: I discussed the patient with Dr. Morris of the ICU service.    Patient will be transferred to the [facility] via EMS. Discussed the case with Dr. Morris, who will admit the patient to a monitored bed for further monitoring, evaluation, and treatment.      Impression & Plan      Medical Decision Making:  Jong Galarza is a 67 year old male who presents after being unresponsive at home by his daughter; patient in acute respiratory failure. History and records reviewed. Initial consideration for, but not limited to, intracranial bleed/pathology, sepsis, toxic ingestion/OD, intoxication, cardiac etiology, electrolyte abnormality, arrhythmia, among others. On initial arrival, patient was very hypotensive with palpable pulses. He was provided 100 mcg of phenylephrine x2 with minimal improvement. Later provided total of 1.5 mg of epinephrine. CPR was initiated for a brief period of time,as patient's blood pressures continued to drop, though he did maintain palpable pulses. With epinephrine, patient's blood pressures did temporarily improve and patient was intubated; see intubation note above. At time of intubation, patient had no response to pain or corneal reflex, GCS 3, pupils were minimally reactive.  Following intubation, a central line was placed (right femoral). Patient was started on levophed and later epinephrine due to continued need for blood pressure support. EKG demonstrated sinus tachycardia, with mild ST depression in V3/V4. Troponin negative. Patient without cardiac history. Sinus tachycardia likely secondary to epinephrine administration as patient had a heart rate in the 70's on initial presentation. CT imaging of head and neck was unremarkable. Chest XR showed ET tube at the level of  the arlene. ET tube was pulled back 2 cm. Labs notable for hypomagnesemia (1.6, provided 2 g replacement), elevated ethanol level (0.15), elevated lactic acid level (3.7), anemia (Hgb 11.6), hypocalcemia (5.8, provided 1 g calcium gluconate), hypokalemia (potassium 2.8, provided 20 mEq IV replacement), and ABG demonstrating low pH (7.14). Patient had reportedly not been ill when he was seen at 2:00 PM and was hypothermic in the ED; patient was provided vancomycin and zosyn for infection coverage. UA unremarkable for evidence of infection. UDS was negative. Blood cultures obtained; pending but show no growth after 1 hour. Salicylate level negative. Tylenol level minimally detectable (12). Patient remained unresponsive throughout ED course, without need for sedation. Continued requiring increasing pressure support to maintain blood pressure. Patient was discussed with Dr. Morris at [facility] ICU, as no ICU beds currently available at [facility]. She accepts the patient for admission. Underlying etiology of patient's unresponsiveness is undetermined. At time of transfer, patient had fixed, unreactive pupils. OG and puentes catheter were placed.    Critical Care time:  was 60 minutes for this patient excluding procedures.    Diagnosis:    ICD-10-CM    1. Hypokalemia E87.6 Blood culture   2. Unresponsive R41.89    3. Respiratory arrest (H) R09.2    4. Hypomagnesemia E83.42      Disposition:  The patient will be transferred to an ICU bed at the [facility] under the care of Dr. Morris.     IAugusto, am serving as a scribe on 6/3/2017 at 6:04 PM to personally document services performed by Abel Osorio DO, based on my observations and the provider's statements to me.     EMERGENCY DEPARTMENT       Abel Osorio DO  06/03/17 [id number]       Abel Osorio DO  06/04/17 [id number]

## 2017-06-04 ENCOUNTER — APPOINTMENT (OUTPATIENT)
Dept: CARDIOLOGY | Facility: CLINIC | Age: 67
DRG: 917 | End: 2017-06-04
Attending: ANESTHESIOLOGY
Payer: MEDICARE

## 2017-06-04 LAB
ALBUMIN SERPL-MCNC: 2.6 G/DL (ref 3.4–5)
ALP SERPL-CCNC: 53 U/L (ref 40–150)
ALT SERPL W P-5'-P-CCNC: 25 U/L (ref 0–70)
ANION GAP SERPL CALCULATED.3IONS-SCNC: 16 MMOL/L (ref 3–14)
ANION GAP SERPL CALCULATED.3IONS-SCNC: 6 MMOL/L (ref 3–14)
AST SERPL W P-5'-P-CCNC: 21 U/L (ref 0–45)
BASE DEFICIT BLDA-SCNC: 15.2 MMOL/L
BASE DEFICIT BLDA-SCNC: 15.2 MMOL/L
BASE DEFICIT BLDA-SCNC: 6.4 MMOL/L
BASE DEFICIT BLDA-SCNC: 7 MMOL/L
BILIRUB SERPL-MCNC: 0.3 MG/DL (ref 0.2–1.3)
BUN SERPL-MCNC: 10 MG/DL (ref 7–30)
BUN SERPL-MCNC: 12 MG/DL (ref 7–30)
CA-I BLD-MCNC: 4.3 MG/DL (ref 4.4–5.2)
CA-I BLD-MCNC: 4.5 MG/DL (ref 4.4–5.2)
CALCIUM SERPL-MCNC: 7.4 MG/DL (ref 8.5–10.1)
CALCIUM SERPL-MCNC: 7.6 MG/DL (ref 8.5–10.1)
CHLORIDE SERPL-SCNC: 114 MMOL/L (ref 94–109)
CHLORIDE SERPL-SCNC: 118 MMOL/L (ref 94–109)
CO2 SERPL-SCNC: 12 MMOL/L (ref 20–32)
CO2 SERPL-SCNC: 22 MMOL/L (ref 20–32)
CREAT SERPL-MCNC: 0.84 MG/DL (ref 0.66–1.25)
CREAT SERPL-MCNC: 0.85 MG/DL (ref 0.66–1.25)
ERYTHROCYTE [DISTWIDTH] IN BLOOD BY AUTOMATED COUNT: 12.9 % (ref 10–15)
ERYTHROCYTE [DISTWIDTH] IN BLOOD BY AUTOMATED COUNT: 13.2 % (ref 10–15)
GFR SERPL CREATININE-BSD FRML MDRD: ABNORMAL ML/MIN/1.7M2
GFR SERPL CREATININE-BSD FRML MDRD: ABNORMAL ML/MIN/1.7M2
GLUCOSE BLDC GLUCOMTR-MCNC: 100 MG/DL (ref 70–99)
GLUCOSE BLDC GLUCOMTR-MCNC: 100 MG/DL (ref 70–99)
GLUCOSE BLDC GLUCOMTR-MCNC: 109 MG/DL (ref 70–99)
GLUCOSE BLDC GLUCOMTR-MCNC: 115 MG/DL (ref 70–99)
GLUCOSE BLDC GLUCOMTR-MCNC: 131 MG/DL (ref 70–99)
GLUCOSE BLDC GLUCOMTR-MCNC: 155 MG/DL (ref 70–99)
GLUCOSE BLDC GLUCOMTR-MCNC: 171 MG/DL (ref 70–99)
GLUCOSE BLDC GLUCOMTR-MCNC: 178 MG/DL (ref 70–99)
GLUCOSE BLDC GLUCOMTR-MCNC: 193 MG/DL (ref 70–99)
GLUCOSE BLDC GLUCOMTR-MCNC: 214 MG/DL (ref 70–99)
GLUCOSE BLDC GLUCOMTR-MCNC: 73 MG/DL (ref 70–99)
GLUCOSE BLDC GLUCOMTR-MCNC: 74 MG/DL (ref 70–99)
GLUCOSE BLDC GLUCOMTR-MCNC: 75 MG/DL (ref 70–99)
GLUCOSE BLDC GLUCOMTR-MCNC: 79 MG/DL (ref 70–99)
GLUCOSE SERPL-MCNC: 111 MG/DL (ref 70–99)
GLUCOSE SERPL-MCNC: 219 MG/DL (ref 70–99)
HCO3 BLD-SCNC: 12 MMOL/L (ref 21–28)
HCO3 BLD-SCNC: 12 MMOL/L (ref 21–28)
HCO3 BLD-SCNC: 16 MMOL/L (ref 21–28)
HCO3 BLD-SCNC: 18 MMOL/L (ref 21–28)
HCT VFR BLD AUTO: 35.7 % (ref 40–53)
HCT VFR BLD AUTO: 38.7 % (ref 40–53)
HGB BLD-MCNC: 12.2 G/DL (ref 13.3–17.7)
HGB BLD-MCNC: 13.1 G/DL (ref 13.3–17.7)
INR PPP: 1.18 (ref 0.86–1.14)
KETONES BLD-SCNC: 0.1 MMOL/L (ref 0–0.6)
LACTATE BLD-SCNC: 1.7 MMOL/L (ref 0.7–2.1)
LACTATE BLD-SCNC: 8.1 MMOL/L (ref 0.7–2.1)
MAGNESIUM SERPL-MCNC: 1.8 MG/DL (ref 1.6–2.3)
MAGNESIUM SERPL-MCNC: 1.9 MG/DL (ref 1.6–2.3)
MCH RBC QN AUTO: 31.3 PG (ref 26.5–33)
MCH RBC QN AUTO: 31.3 PG (ref 26.5–33)
MCHC RBC AUTO-ENTMCNC: 33.9 G/DL (ref 31.5–36.5)
MCHC RBC AUTO-ENTMCNC: 34.2 G/DL (ref 31.5–36.5)
MCV RBC AUTO: 92 FL (ref 78–100)
MCV RBC AUTO: 93 FL (ref 78–100)
MRSA DNA SPEC QL NAA+PROBE: NORMAL
O2/TOTAL GAS SETTING VFR VENT: 30 %
O2/TOTAL GAS SETTING VFR VENT: 45 %
O2/TOTAL GAS SETTING VFR VENT: 50 %
O2/TOTAL GAS SETTING VFR VENT: 60 %
OSMOLALITY SERPL: 299 MMOL/KG (ref 280–301)
OSMOLALITY SERPL: 306 MMOL/KG (ref 280–301)
OSMOLALITY SERPL: 332 MMOL/KG (ref 280–301)
OSMOLALITY UR: 552 MMOL/KG (ref 100–1200)
OXYHGB MFR BLD: 96 % (ref 92–100)
PCO2 BLD: 23 MM HG (ref 35–45)
PCO2 BLD: 30 MM HG (ref 35–45)
PCO2 BLD: 33 MM HG (ref 35–45)
PCO2 BLD: 34 MM HG (ref 35–45)
PH BLD: 7.17 PH (ref 7.35–7.45)
PH BLD: 7.2 PH (ref 7.35–7.45)
PH BLD: 7.33 PH (ref 7.35–7.45)
PH BLD: 7.47 PH (ref 7.35–7.45)
PHOSPHATE SERPL-MCNC: 1.6 MG/DL (ref 2.5–4.5)
PHOSPHATE SERPL-MCNC: 2.2 MG/DL (ref 2.5–4.5)
PLATELET # BLD AUTO: 155 10E9/L (ref 150–450)
PLATELET # BLD AUTO: 217 10E9/L (ref 150–450)
PO2 BLD: 113 MM HG (ref 80–105)
PO2 BLD: 138 MM HG (ref 80–105)
PO2 BLD: 91 MM HG (ref 80–105)
PO2 BLD: 92 MM HG (ref 80–105)
POTASSIUM SERPL-SCNC: 3.8 MMOL/L (ref 3.4–5.3)
POTASSIUM SERPL-SCNC: 3.9 MMOL/L (ref 3.4–5.3)
POTASSIUM SERPL-SCNC: 4.2 MMOL/L (ref 3.4–5.3)
POTASSIUM SERPL-SCNC: 4.2 MMOL/L (ref 3.4–5.3)
PROCALCITONIN SERPL-MCNC: NORMAL NG/ML
PROT SERPL-MCNC: 5.2 G/DL (ref 6.8–8.8)
RBC # BLD AUTO: 3.9 10E12/L (ref 4.4–5.9)
RBC # BLD AUTO: 4.18 10E12/L (ref 4.4–5.9)
SODIUM SERPL-SCNC: 141 MMOL/L (ref 133–144)
SODIUM SERPL-SCNC: 142 MMOL/L (ref 133–144)
SODIUM SERPL-SCNC: 144 MMOL/L (ref 133–144)
SODIUM SERPL-SCNC: 147 MMOL/L (ref 133–144)
SPECIMEN SOURCE: NORMAL
WBC # BLD AUTO: 12.4 10E9/L (ref 4–11)
WBC # BLD AUTO: 25.5 10E9/L (ref 4–11)

## 2017-06-04 PROCEDURE — 40000275 ZZH STATISTIC RCP TIME EA 10 MIN

## 2017-06-04 PROCEDURE — 25000128 H RX IP 250 OP 636

## 2017-06-04 PROCEDURE — 00000146 ZZHCL STATISTIC GLUCOSE BY METER IP

## 2017-06-04 PROCEDURE — 25800025 ZZH RX 258: Performed by: STUDENT IN AN ORGANIZED HEALTH CARE EDUCATION/TRAINING PROGRAM

## 2017-06-04 PROCEDURE — S5010 5% DEXTROSE AND 0.45% SALINE: HCPCS | Performed by: STUDENT IN AN ORGANIZED HEALTH CARE EDUCATION/TRAINING PROGRAM

## 2017-06-04 PROCEDURE — 84132 ASSAY OF SERUM POTASSIUM: CPT | Performed by: STUDENT IN AN ORGANIZED HEALTH CARE EDUCATION/TRAINING PROGRAM

## 2017-06-04 PROCEDURE — 82330 ASSAY OF CALCIUM: CPT | Performed by: STUDENT IN AN ORGANIZED HEALTH CARE EDUCATION/TRAINING PROGRAM

## 2017-06-04 PROCEDURE — 87640 STAPH A DNA AMP PROBE: CPT | Performed by: STUDENT IN AN ORGANIZED HEALTH CARE EDUCATION/TRAINING PROGRAM

## 2017-06-04 PROCEDURE — 25000128 H RX IP 250 OP 636: Performed by: STUDENT IN AN ORGANIZED HEALTH CARE EDUCATION/TRAINING PROGRAM

## 2017-06-04 PROCEDURE — 83930 ASSAY OF BLOOD OSMOLALITY: CPT | Performed by: STUDENT IN AN ORGANIZED HEALTH CARE EDUCATION/TRAINING PROGRAM

## 2017-06-04 PROCEDURE — 82803 BLOOD GASES ANY COMBINATION: CPT | Performed by: STUDENT IN AN ORGANIZED HEALTH CARE EDUCATION/TRAINING PROGRAM

## 2017-06-04 PROCEDURE — 85610 PROTHROMBIN TIME: CPT | Performed by: STUDENT IN AN ORGANIZED HEALTH CARE EDUCATION/TRAINING PROGRAM

## 2017-06-04 PROCEDURE — 93306 TTE W/DOPPLER COMPLETE: CPT | Mod: 26 | Performed by: INTERNAL MEDICINE

## 2017-06-04 PROCEDURE — 27210136 ZZH KIT CATH ARTERIAL EXT SUPPLY

## 2017-06-04 PROCEDURE — 80320 DRUG SCREEN QUANTALCOHOLS: CPT | Performed by: STUDENT IN AN ORGANIZED HEALTH CARE EDUCATION/TRAINING PROGRAM

## 2017-06-04 PROCEDURE — 80048 BASIC METABOLIC PNL TOTAL CA: CPT | Performed by: STUDENT IN AN ORGANIZED HEALTH CARE EDUCATION/TRAINING PROGRAM

## 2017-06-04 PROCEDURE — 25000125 ZZHC RX 250: Performed by: STUDENT IN AN ORGANIZED HEALTH CARE EDUCATION/TRAINING PROGRAM

## 2017-06-04 PROCEDURE — 82010 KETONE BODYS QUAN: CPT | Performed by: STUDENT IN AN ORGANIZED HEALTH CARE EDUCATION/TRAINING PROGRAM

## 2017-06-04 PROCEDURE — 94002 VENT MGMT INPAT INIT DAY: CPT

## 2017-06-04 PROCEDURE — 93306 TTE W/DOPPLER COMPLETE: CPT

## 2017-06-04 PROCEDURE — S5010 5% DEXTROSE AND 0.45% SALINE: HCPCS

## 2017-06-04 PROCEDURE — 87641 MR-STAPH DNA AMP PROBE: CPT | Performed by: STUDENT IN AN ORGANIZED HEALTH CARE EDUCATION/TRAINING PROGRAM

## 2017-06-04 PROCEDURE — 83605 ASSAY OF LACTIC ACID: CPT | Performed by: STUDENT IN AN ORGANIZED HEALTH CARE EDUCATION/TRAINING PROGRAM

## 2017-06-04 PROCEDURE — 80053 COMPREHEN METABOLIC PANEL: CPT | Performed by: STUDENT IN AN ORGANIZED HEALTH CARE EDUCATION/TRAINING PROGRAM

## 2017-06-04 PROCEDURE — 80307 DRUG TEST PRSMV CHEM ANLYZR: CPT | Performed by: STUDENT IN AN ORGANIZED HEALTH CARE EDUCATION/TRAINING PROGRAM

## 2017-06-04 PROCEDURE — 82805 BLOOD GASES W/O2 SATURATION: CPT | Performed by: STUDENT IN AN ORGANIZED HEALTH CARE EDUCATION/TRAINING PROGRAM

## 2017-06-04 PROCEDURE — 40000014 ZZH STATISTIC ARTERIAL MONITORING DAILY

## 2017-06-04 PROCEDURE — 25800025 ZZH RX 258

## 2017-06-04 PROCEDURE — 82693 ASSAY OF ETHYLENE GLYCOL: CPT | Performed by: STUDENT IN AN ORGANIZED HEALTH CARE EDUCATION/TRAINING PROGRAM

## 2017-06-04 PROCEDURE — 85027 COMPLETE CBC AUTOMATED: CPT | Performed by: STUDENT IN AN ORGANIZED HEALTH CARE EDUCATION/TRAINING PROGRAM

## 2017-06-04 PROCEDURE — 83735 ASSAY OF MAGNESIUM: CPT | Performed by: STUDENT IN AN ORGANIZED HEALTH CARE EDUCATION/TRAINING PROGRAM

## 2017-06-04 PROCEDURE — 84100 ASSAY OF PHOSPHORUS: CPT | Performed by: STUDENT IN AN ORGANIZED HEALTH CARE EDUCATION/TRAINING PROGRAM

## 2017-06-04 PROCEDURE — 20000004 ZZH R&B ICU UMMC

## 2017-06-04 PROCEDURE — 84295 ASSAY OF SERUM SODIUM: CPT | Performed by: STUDENT IN AN ORGANIZED HEALTH CARE EDUCATION/TRAINING PROGRAM

## 2017-06-04 RX ORDER — NICOTINE POLACRILEX 4 MG
15-30 LOZENGE BUCCAL
Status: DISCONTINUED | OUTPATIENT
Start: 2017-06-04 | End: 2017-06-06 | Stop reason: HOSPADM

## 2017-06-04 RX ORDER — DIAZEPAM 10 MG/2ML
5-20 INJECTION, SOLUTION INTRAMUSCULAR; INTRAVENOUS SEE ADMIN INSTRUCTIONS
Status: DISCONTINUED | OUTPATIENT
Start: 2017-06-04 | End: 2017-06-05

## 2017-06-04 RX ORDER — HYDROMORPHONE HYDROCHLORIDE 1 MG/ML
INJECTION, SOLUTION INTRAMUSCULAR; INTRAVENOUS; SUBCUTANEOUS
Status: DISCONTINUED
Start: 2017-06-04 | End: 2017-06-05 | Stop reason: HOSPADM

## 2017-06-04 RX ORDER — HYDROMORPHONE HYDROCHLORIDE 1 MG/ML
INJECTION, SOLUTION INTRAMUSCULAR; INTRAVENOUS; SUBCUTANEOUS
Status: COMPLETED
Start: 2017-06-04 | End: 2017-06-04

## 2017-06-04 RX ORDER — DIAZEPAM 5 MG
5-20 TABLET ORAL SEE ADMIN INSTRUCTIONS
Status: DISCONTINUED | OUTPATIENT
Start: 2017-06-04 | End: 2017-06-06

## 2017-06-04 RX ORDER — DEXTROSE MONOHYDRATE 25 G/50ML
25-50 INJECTION, SOLUTION INTRAVENOUS
Status: DISCONTINUED | OUTPATIENT
Start: 2017-06-04 | End: 2017-06-06 | Stop reason: HOSPADM

## 2017-06-04 RX ORDER — SODIUM CHLORIDE 9 MG/ML
INJECTION, SOLUTION INTRAVENOUS
Status: DISCONTINUED
Start: 2017-06-04 | End: 2017-06-05 | Stop reason: HOSPADM

## 2017-06-04 RX ORDER — LIDOCAINE HYDROCHLORIDE 10 MG/ML
INJECTION, SOLUTION EPIDURAL; INFILTRATION; INTRACAUDAL; PERINEURAL
Status: DISCONTINUED
Start: 2017-06-04 | End: 2017-06-05 | Stop reason: HOSPADM

## 2017-06-04 RX ADMIN — POTASSIUM CHLORIDE 20 MEQ: 29.8 INJECTION, SOLUTION INTRAVENOUS at 17:53

## 2017-06-04 RX ADMIN — DEXTROSE AND SODIUM CHLORIDE: 5; 450 INJECTION, SOLUTION INTRAVENOUS at 02:28

## 2017-06-04 RX ADMIN — EPINEPHRINE 0.09 MCG/KG/MIN: 1 INJECTION PARENTERAL at 03:50

## 2017-06-04 RX ADMIN — CALCIUM GLUCONATE 1 G: 94 INJECTION, SOLUTION INTRAVENOUS at 15:05

## 2017-06-04 RX ADMIN — POTASSIUM CHLORIDE 20 MEQ: 29.8 INJECTION, SOLUTION INTRAVENOUS at 00:06

## 2017-06-04 RX ADMIN — SODIUM CHLORIDE, POTASSIUM CHLORIDE, SODIUM LACTATE AND CALCIUM CHLORIDE 1000 ML: 600; 310; 30; 20 INJECTION, SOLUTION INTRAVENOUS at 01:39

## 2017-06-04 RX ADMIN — POTASSIUM PHOSPHATE, MONOBASIC AND POTASSIUM PHOSPHATE, DIBASIC 15 MMOL: 224; 236 INJECTION, SOLUTION INTRAVENOUS at 19:32

## 2017-06-04 RX ADMIN — FENTANYL CITRATE 50 MCG: 50 INJECTION INTRAMUSCULAR; INTRAVENOUS at 19:18

## 2017-06-04 RX ADMIN — FENTANYL CITRATE 25 MCG/HR: 50 INJECTION, SOLUTION INTRAMUSCULAR; INTRAVENOUS at 02:28

## 2017-06-04 RX ADMIN — FENTANYL CITRATE 50 MCG: 50 INJECTION INTRAMUSCULAR; INTRAVENOUS at 12:05

## 2017-06-04 RX ADMIN — Medication 25 ML: at 13:49

## 2017-06-04 RX ADMIN — POTASSIUM CHLORIDE 20 MEQ: 29.8 INJECTION, SOLUTION INTRAVENOUS at 01:17

## 2017-06-04 RX ADMIN — FENTANYL CITRATE 100 MCG: 50 INJECTION INTRAMUSCULAR; INTRAVENOUS at 05:05

## 2017-06-04 RX ADMIN — Medication 2 G: at 04:49

## 2017-06-04 RX ADMIN — FENTANYL CITRATE 50 MCG: 50 INJECTION INTRAMUSCULAR; INTRAVENOUS at 09:35

## 2017-06-04 RX ADMIN — HUMAN INSULIN 1.5 UNITS/HR: 100 INJECTION, SOLUTION SUBCUTANEOUS at 02:07

## 2017-06-04 RX ADMIN — POTASSIUM PHOSPHATE, MONOBASIC AND POTASSIUM PHOSPHATE, DIBASIC 20 MMOL: 224; 236 INJECTION, SOLUTION INTRAVENOUS at 07:05

## 2017-06-04 RX ADMIN — Medication 1 MG: at 21:28

## 2017-06-04 RX ADMIN — MIDAZOLAM HYDROCHLORIDE 2 MG: 1 INJECTION, SOLUTION INTRAMUSCULAR; INTRAVENOUS at 05:47

## 2017-06-04 RX ADMIN — FENTANYL CITRATE 50 MCG: 50 INJECTION INTRAMUSCULAR; INTRAVENOUS at 14:40

## 2017-06-04 RX ADMIN — Medication 2 G: at 17:58

## 2017-06-04 RX ADMIN — FENTANYL CITRATE 100 MCG: 50 INJECTION INTRAMUSCULAR; INTRAVENOUS at 20:54

## 2017-06-04 RX ADMIN — PANTOPRAZOLE SODIUM 40 MG: 40 INJECTION, POWDER, FOR SOLUTION INTRAVENOUS at 09:01

## 2017-06-04 RX ADMIN — DEXTROSE AND SODIUM CHLORIDE: 5; 450 INJECTION, SOLUTION INTRAVENOUS at 12:44

## 2017-06-04 RX ADMIN — FENTANYL CITRATE 50 MCG: 50 INJECTION INTRAMUSCULAR; INTRAVENOUS at 03:00

## 2017-06-04 RX ADMIN — POTASSIUM CHLORIDE 20 MEQ: 29.8 INJECTION, SOLUTION INTRAVENOUS at 05:47

## 2017-06-04 RX ADMIN — FOLIC ACID: 5 INJECTION, SOLUTION INTRAMUSCULAR; INTRAVENOUS; SUBCUTANEOUS at 02:07

## 2017-06-04 RX ADMIN — HYDROMORPHONE HYDROCHLORIDE 1 MG: 1 INJECTION, SOLUTION INTRAMUSCULAR; INTRAVENOUS; SUBCUTANEOUS at 21:28

## 2017-06-04 ASSESSMENT — ACTIVITIES OF DAILY LIVING (ADL)
RETIRED_EATING: 0-->INDEPENDENT
TOILETING: 0-->INDEPENDENT
COGNITION: 0 - NO COGNITION ISSUES REPORTED
SWALLOWING: 0-->SWALLOWS FOODS/LIQUIDS WITHOUT DIFFICULTY
BATHING: 0-->INDEPENDENT
TRANSFERRING: 0-->INDEPENDENT
RETIRED_COMMUNICATION: 0-->UNDERSTANDS/COMMUNICATES WITHOUT DIFFICULTY
AMBULATION: 0-->INDEPENDENT
DRESS: 0-->INDEPENDENT
FALL_HISTORY_WITHIN_LAST_SIX_MONTHS: NO

## 2017-06-04 ASSESSMENT — VISUAL ACUITY
OU: NOT TESTABLE

## 2017-06-04 NOTE — ED NOTES
Prior to arrival to ED: Last seen normal at 2pm.  Daughter came home from shopping at 5pm and found patient unresponsive in chair with unknown reason.  Called 911.  EMS assisted respirations with ambu and gave 4mg Narcan with no response.  Upon arrival to ED faint palpable pulse with assisted bagging.  Hypotensive: 100mcg Phenylephrine x2 pushes; 2 L NS given; 1.5 amps epi, and 80 mg Rocuronium given.  Norepi gtt started at , and epi gtt started at .  Intubated at 1820: 23 at lip.  X-ray confirmation for placement.  Johnathan  to ; then  to .  Right femoral central line with triple lumen: Epi and levo running.  16Fr OG at 60 at teeth.  16 Fr puentes with temp probe inserted: urine sent.  Lactic at 3.7 and blood ethanol level 0.15.  Still unresponsive; 2-3 pupil size round with no response.  Head CT: no bleed.  BP now 95/59.  Teenage daughter here with patient.  Wife .  Zosyn, calcium gluconate, potassium, epi, levo, and magnesium running currently.

## 2017-06-04 NOTE — PROCEDURES
"Procedure/Surgery Information   [facility] [place]    Bedside Procedure Note  Date of Service (when I performed the procedure): 06/04/2017    Jong Galarza is a 67 year old male patient.  No diagnosis found.  No past medical history on file.              SpO2: 94 % O2 Device: Mechanical Ventilator      Insert arterial line  Date/Time: 6/4/2017 12:39 AM  Performed by: KARINA SHEPHERD  Authorized by: KARINA SHEPHERD   Consent: The procedure was performed in an emergent situation. Verbal consent obtained. Written consent obtained.  Risks and benefits: risks, benefits and alternatives were discussed  Consent given by: power of   Patient understanding: patient states understanding of the procedure being performed  Patient consent: the patient's understanding of the procedure matches consent given  Procedure consent: procedure consent matches procedure scheduled  Relevant documents: relevant documents present and verified  Test results: test results available and properly labeled  Site marked: the operative site was marked  Imaging studies: imaging studies available  Required items: required blood products, implants, devices, and special equipment available  Patient identity confirmed: hospital-assigned identification number and anonymous protocol, patient vented/unresponsive  Time out: Immediately prior to procedure a \"time out\" was called to verify the correct patient, procedure, equipment, support staff and site/side marked as required.  Preparation: Patient was prepped and draped in the usual sterile fashion.  Indications: multiple ABGs and hemodynamic monitoring  Location: left radial  Anesthesia: local infiltration    Anesthesia:  Anesthesia: local infiltration  Local Anesthetic: lidocaine 2% without epinephrine   Anesthetic total: 3 mL  Sedation:  Patient sedated: no    Abilio's test normal: yes  Needle gauge: 18  Seldinger technique: Seldinger technique " used  Number of attempts: 4  Post-procedure: line sutured and dressing applied  Post-procedure CMS: normal  Patient tolerance: Patient tolerated the procedure well with no immediate complications        Tamia Zapata MD  PGY-2, Internal Medicine    Dr. Morris was present and assisted during the procedure.

## 2017-06-04 NOTE — H&P
[facility]      MICU History and Physicial  Jong Galarza MRN: [id number]  1950  Date of Admission:(Not on file)  Primary care provider: No primary care provider on file.      Assessment and Plan:     68 yo M with hx of depression, ETOH abuse, HLD, GERD, and erectile dysfunction who was recently incarcerated for the last several months and released on 5/20/17 who was found unresponsive around 5PM on 6/3/17 and transferred to [facility] with concerns for acute alcohol overdose with possible venlafaxine combination.    PLAN:  ===NEURO===  # Sedation  # AMS with Concern for Anoxic Brain Injury   # ETOH Abuse with Possible Co-Ingestion of Unknown Substance  Pt does have prescriptions for venlafaxine, trazodone, and Ativan. Negative for benzo on UDS on admission. Venlafaxine with ETOH could account for Pt's AMS and hypotension, but venlafaxine is not dialyzable. CT Head on admission with acute intracranial abnormalities. UDS unremarkable except ETOH level of 0.15. Ammonia normal.  - Initially not making any movements, but did move left hand twice during arterial line procedure and has started overbreathing the ventilator with more agitation throughout the night  - Comprehensive drug screen ordered  - Fent gtt @ 25 /hr with PRN bumps  - Versed 1-2 mg Q1H IV PRN given increasing agitation and previously on Ativan, prevent withdrawal  - MSSA protocol with diazepam  - Q2H Neuro checks  - Will most likely need repeat CT Head and/or MRI brain in next 1-2 days depending mental status  - Consider Neuro consult, possible EEG  - Having Pt's daughter bring up all Pt's pill bottles to help figure out what Pt could have possibly taken    ===CARDIOVASCULAR===  # Hypotension  Most likely in setting of ETOH and possible venlafaxine or other co-ingestion overdose with possible anoxic brain injury component. Afebrile, no leukocytosis on admission, normal procal, and no previous symptoms of infection per daughter PTA to  suspect sepsis. No prior cardiac hx. EKG with ST depressions in V3-V4, trop negative x2.  - On norepi, vasopressin, and epi gtts  - s/p 2 doses of phenylephrine 100 mcg when MAPs acutely dropped when one of the gtt's ran out and there was a delay in getting the medication  - s/p 3L NS, 1L LR, banana bag, and on D5/0.45NS @ 100/hr  - Strict I/O  - MAP goal >65  - TTE ordered    ===PULMONARY===  # Intubated 2/2 Unable to Protect Airway  # Metabolic Acidosis with Minimal Respiratory Compensation  CXR unremarkable.  - Wean ventilator settings as able  - Monitor ABG given metabolic acidosis, adjust ventilator as able    ===GASTROINTESTINAL===  # No active issues    # Nutrition:   - NPO    ===RENAL===  UOP: 100-150 cc/hr  # Metabolic Acidosis  # Lactic Acidosis  Most likely in setting of ETOH abuse, possible overdose of unknown substance, consider venlafaxine. Lactate rising despite fluid resuscitation. Neg ketones in urine. Neg salicylate level. Normal APAP. Negative basic UDS.  - IVFs as above  - Monitor LA Q6H  - Monitoring UOP closely  - Ordered methanol level, ethylene glycol level, ketone beta-hydroxybutyrate, comprehensive drug screen  - Monitor ABGs closely    # Mild Hypernatremia   on admission.  - s/p IVFs as above  - Monitor Q6H, with max change of 10-12 mEq / 24 hours    # Hypokalemia  K 2.8 on presentation.  - s/p 20 mEq in ED, on replacement protocol  - Monitor Q6H    # Hypophosphatemia  Most likely in setting of ETOH abuse. Phos 1.7 on admission.  - Monitor Q12H, on replacement protocol    # Fluids: s/p 3L NS, 1L LR, banana bag, and on D5/0.45NS @ 100/hr    ===HEME/ONC===  # Leukocytosis  Normal on presentation to OSH, elevated to 21.6 on repeat check after CPR and intubation. Most likely stress response. Very low risk for sepsis at this time.  - Will continue to monitor    ===ENDOCRINE===  # Hyperglycemia  Most likely 2/2 to stress. No prior hx of DM.  - Insulin gtt    ===INFECTIOUS DISEASE===  # No  active issues    # Antimicrobials:  Received one dose of vanc and zosyn prior to transfer, no other abx ordered at this time    ===SKIN/MSK===  # No active issues    LINES: R femoral triple-lumen CVC (6/3), L radial arterial line (6/3), puentes, 2 PIVs, ETT, OG    Prophylaxis:  DVT: pneumoboots   GI: pantoprazole 40 IV daily  Family:  Updated in person  Disposition: Critically Ill  Code Status: FULL    Patient was seen and discussed with attending physician Dr. Morris, who agrees with above assessment and plan.    Tamia Zapata MD  Internal Medicine, PGY-2  [phone]         Chief Complaint:   unresponsive         History of Present Illness:   Hx obtained per chart review and discussion with Pt's daughter    68 yo M with hx of depression, ETOH abuse, HLD, GERD, and erectile dysfunction who was recently incarcerated for the last several months and released on 5/20/17 who was found unresponsive around 5PM on 6/3/17. Pt's daughter reports she saw him about three hours earlier in the day, and he was acting like his usual self with no strange behavior or complaints, and then when she returned to the house around 5PM, she found him unresponsive in his chair. She called 911, and when EMS arrived, his initial BP was 64/40 with pulse in the 50s. A blood sugar at that time was 188. There was no evidence of trauma, and no pill bottles or liquor bottles were near the Pt. Pt's breathing was very labored and near agonal at that time. Pt got 4 mg of Narcan at the scene, but no response. He was brought to [facility] ED for further cares.     Pt's daughter reports he is a recovering alcoholic with no known cardiac history, and she knows he was not drinking while incarcerated for three months, but was not aware he had started drinking again until she found out he had ETOH in his system at the outside hospital. She did go home and found some bottles of alcohol that were empty in the house, but did not know when he drank  them. She is unsure of what medications he is on, but knows he takes several including meds for depression and anxiety. She reports the Pt does see a Psychiatrist regularly. Pt has never tried to harm himself or attempt suicide in the past, and his daughter denies any strange behavior or conversations with the Pt recently regarding depressed mood. She does report she lives with the Pt, but is not with him all the time, and he has a poor support system. Pt's daughter reports he has never abused or to her knowledge used any other drugs than alcohol.    At the [facility] ED, initially tried positive pressure ventilation, then got 2L bolus of NS, then when MAPs were in the 40s with weak pulse, chest compressions were started and had three minutes of compressions with the Johnathan machine. Pt received three doses of 0.5 mg of epi and started on levophed gtt. Pt was then intubated. A central line was placed in his right femoral vein. Pt was started on an epi gtt. Given dose of vanc and zosyn. Pt got 1 g of calcium gluconate, 20 mEq of IV K, and 2 g of Mag sulfate. Pt never lost a pulse. Transferred to [facility] for ongoing cares.         Review of Systems:    Unable to perform due to patient condition.          Past Medical History:   Medical History reviewed.   No past medical history on file.   Depression, alcohol abuse, erectile dysfunction, GERD, HLD         Past Surgical History:   Surgical History reviewed.   No past surgical history on file.          Social History:   Social History reviewed.  Social History   Substance Use Topics     Smoking status: Not on file     Smokeless tobacco: Not on file     Alcohol use Not on file   , per Pt's daughter recovering alcoholic          Family History:   Family History reviewed.   No family history on file.          Allergies:     Allergies   Allergen Reactions     Sulfa Drugs              Medications:   Medications Reviewed.   Current Facility-Administered Medications    Medication     fentaNYL (SUBLIMAZE) infusion     lactated ringers BOLUS 1,000 mL     dextrose 10 % 1,000 mL infusion     insulin 1 unit/mL in saline (NovoLIN, HumuLIN Regular) drip - ADULT IV Infusion     glucose 40 % gel 15-30 g    Or     dextrose 50 % injection 25-50 mL    Or     glucagon injection 1 mg     dextrose 5% and 0.45% NaCl infusion     dextrose 5% and 0.45% NaCl 1,000 mL with multivitamin-ADULT (INFUVITE) 10 mL, thiamine 100 mg, folic acid 1 mg, potassium chloride 20 mEq infusion     dextrose 5% and 0.45% NaCl 5-0.45 % infusion     naloxone (NARCAN) injection 0.1-0.4 mg     norepinephrine (LEVOPHED) 16 mg in D5W 250 mL infusion     EPINEPHrine (ADRENALIN) 5 mg in NaCl 0.9 % 250 mL infusion     pantoprazole (PROTONIX) 40 mg IV push injection     fentaNYL Citrate (PF) (SUBLIMAZE) injection  mcg     potassium chloride SA (K-DUR/KLOR-CON M) CR tablet 20-40 mEq     potassium chloride (KLOR-CON) Packet 20-40 mEq     potassium chloride 10 mEq in 100 mL intermittent infusion     potassium chloride 10 mEq in 100 mL intermittent infusion with 10 mg lidocaine     potassium chloride 20 mEq in 50 mL intermittent infusion     magnesium sulfate 2 g in NS intermittent infusion (PharMEDium or FV Cmpd)     magnesium sulfate 4 g in 100 mL sterile water (premade)     potassium phosphate 10 mmol in D5W 250 mL intermittent infusion     potassium phosphate 15 mmol in D5W 250 mL intermittent infusion     potassium phosphate 20 mmol in D5W 500 mL intermittent infusion     potassium phosphate 20 mmol in D5W 250 mL intermittent infusion     potassium phosphate 25 mmol in D5W 500 mL intermittent infusion     vasopressin (PITRESSIN) 40 Units in D5W 40 mL infusion     NaCl 0.9 % infusion             Physical Exam:   Vitals were reviewed.  Temperature 96.2  F (35.7  C), temperature source Axillary, weight 75.5 kg (166 lb 7.2 oz), SpO2 94 %.    General: intubated, not making any purposeful movements, NAD  Skin: warm and dry,  not jaundiced  HEENT: pupils 3 mm equally, minimally reactive bilaterally, atraumatic, neck supple, dry mucous membranes  CV: tachy rate, regular rhythm, no murmurs, rubs, or gallops  Resp: Clear to auscultation bilaterally, no wheezes or crackles  Abd: Soft, non-distended, BS+, no masses appreciated  Extremities: warm and well perfused, 3+ bounding palpable pulses, no peripheral edema  Neuro: intubated, not making any purposeful movements, some twitches, did move arm a couple of times during arterial line placement, not following any commands  Psych: unable to assess         Data:   I/O last 3 completed shifts:  In: 10 [I.V.:10]  Out: -     ROUTINE LABS (Last four results)  CMP    Recent Labs  Lab 06/03/17 2234 06/03/17 1811   * 149*   POTASSIUM 3.2* 2.8*   CHLORIDE 118* 122*   CO2 17* 14*   ANIONGAP 13 13   * 126*   BUN 14 12   CR 1.12 0.96   GFRESTIMATED 66 78   GFRESTBLACK 79 >90African American GFR Calc   BLANCA 7.0* 5.8*   MAG 2.4* 1.6   PHOS 1.7*  --    PROTTOTAL  --  4.0*   ALBUMIN  --  2.2*   BILITOTAL  --  0.3   ALKPHOS  --  42   AST  --  10   ALT  --  11     CBC    Recent Labs  Lab 06/03/17 2234 06/03/17 1811   WBC 21.6* 6.0   RBC 4.21* 3.65*   HGB 13.3 11.6*   HCT 38.9* 33.6*   MCV 92 92   MCH 31.6 31.8   MCHC 34.2 34.5   RDW 12.9 12.9    174     INR    Recent Labs  Lab 06/03/17  1811   INR 1.13     Arterial Blood Gas    Recent Labs  Lab 06/04/17 0102 06/03/17  1949   PH 7.17* 7.14*   PCO2 33* 46*   PO2 113* 102   HCO3 12* 16*   O2PER 60.0  --

## 2017-06-04 NOTE — ED NOTES
Phenylephrine 100mcg IV push given at 1808 and 1814.   Rocuronium 80 mg IV push at 1818.  1 amp epi at 1822.  1/2 amp epi at 1842.

## 2017-06-04 NOTE — ED NOTES
"Pt appears to be \"grimacing\" around tube slightly, still no purposeful response to simulation noted, MD notified, bite block applied by RT. Family at bedside, updated on plan of care and discharge plan.   "

## 2017-06-04 NOTE — PROGRESS NOTES
Patient intubated for airway protection at 1820. 7.5 ETT secured at 23 cm at the teeth.     Ventilation Mode: CMV/AC  FiO2 (%): 40 %  Rate Set (breaths/minute): 16 breaths/min  Tidal Volume Set (mL): 500 mL  PEEP (cm H2O): 5 cmH2O  Oxygen Concentration (%): 50 %  Resp: 19    ABG Results:    Recent Labs  Lab 06/03/17 1949   PH 7.14*   PCO2 46*   PO2 102   HCO3 16*     No new vent orders at this time    ETT appearance on chest x-ray: No new results after ETT was pulled back 2 cm to 23 cm at the teeth.    Plan:  Pt is transferring to another hospital.  6/3/2017  Kandace Dupont RRT

## 2017-06-04 NOTE — PROGRESS NOTES
[facility]      MICU Progress Note  Jong Galarza MRN: [id number]  1950  Date of Admission:6/3/2017  Primary care provider: No primary care provider on file.    Assessment and Plan:     68 yo M with hx of depression, ETOH abuse, HLD, GERD, and erectile dysfunction who was recently incarcerated for the last several months and released on 5/20/17, found unresponsive around 5PM on 6/3/17 and transferred to [facility], concern for alcohol/other ingestion versus infection.    UPDATE @ 5:02 PM  Patient awake, acidosis resolving, did well on pressure support, extubating.    ===NEURO===  # Sedation  # AMS with Concern for Anoxic Brain Injury   # ETOH Abuse with Possible Co-Ingestion of Unknown Substance  Pt does have prescriptions for venlafaxine, trazodone, and Ativan. Negative for benzo on UDS on admission.  CT Head on admission with no acute intracranial abnormalities. UDS unremarkable except ETOH level of 0.15. Ammonia normal. Daughter brought in pill bottles, no significant amounts missing, no unreported medications.  No known aspiration, no seizure history.  On initial exam had strong reaction to neck exam, equivocal reaction to leg raise.  - Patient had significant improvement by 1500 on 6/4/17, was off of all pressors, fentanyl down to 25mcg/hr, opening eyes to name and tracking, able to follow commands.  - Repeat exam when patient awake: No nuchal rigidity or pain, increased abdominal distension but non-tender  - Comprehensive drug screen pending  - Fent gtt @ 25 /hr with PRN bumps  - Versed 1-2 mg Q1H IV PRN, previously on Ativan  - [facility]am  - Q2H Neuro checks  - Consider repeat MRI vs. CT imaging if concerns over mental status after extubation     ===CARDIOVASCULAR===  # Hypotension: resolved  Afebrile and no leukocytosis on admission, normal procal, and no previous symptoms of infection per daughter PTA to suspect sepsis. No prior cardiac hx. EKG with ST depressions in V3-V4,  trop negative x2.  - Not requiring vasopressors as of 6/4/17 afternoon, previously on norepi, vasopressin, and epi gtts  - s/p 3L NS, 1L LR, banana bag, and on D5/0.45NS @ 100/hr  - Strict I/O  - MAP goal >65  - Echo: EF 55-60%, no significant abnormalities     ===PULMONARY===  # Intubated 2/2 Unable to Protect Airway  # + AGAP, + osmol gap (accounting for ethanol), mixed metabolic and respiratory acidosis:  CXR unremarkable on admission.  Corrected AGAP of 17 at [facility]  Osmol gap (correcting for ethanol) of 18, could have ingestion of unknown substance (see renal for further workup)  ABG at [facility] ED:pH 7.14 pCO2 46 pO2 102 Bicarb 16 (serum bicarb 14).  - Winter's formula: would have expected pCO2 of 27-31 for appropriate compensation  - Acidemia corrected s/p fluid resuscitation and ventilatory support  - Bicarb still low on ABG, Lactic acid corrected  - Pressure support trial, will extubate today if passes  - Prior Vent settings: Vt 600, Rate 22, PEEP 5, FiO2 45%     ===GASTROINTESTINAL===  # No active issues     # Nutrition:   - NPO     ===RENAL===  I/O last 3 completed shifts:  In: 5121.31 [I.V.:4091.31; NG/GT:30; IV Piggyback:1000]  Out: 2380 [Urine:2255; Emesis/NG output:125]    # + AGAP, + osmol gap, Mixed Metabolic and Respiratory Acidosis (see pulmonary as well)  # Lactic Acidosis  Most likely in setting of ETOH abuse and poor respiration, + osmol gap when corrected for ethanol suggests ingestion of other substance.  Lactate gaby initially despite fluid resuscitation but has corrected rapidly since. Neg ketones in urine. Neg salicylate level. Normal APAP. Negative basic UDS.  Did have chest compressions for 3 min at [facility] ED via Sukhdeep device, had pulse as per report.  - IVFs as above   - Lactic acid now wnl, ABG still with low bicarb but alkalotic pH  - Good urine output  - Ketone beta-hydroxybutyrate negative  - Pending methanol level, ethylene glycol level, comprehensive drug screen  - Monitor  ABGs closely  - Will order urine crystal evaluation for high osmol gap     # Mild Hypernatremia   on admission.  - s/p IVFs as above  - Monitor Q6H, with max change of 10-12 mEq / 24 hours     # Hypokalemia  K 2.8 on presentation.  - s/p 20 mEq in ED, on replacement protocol  - Monitor Q6H     # Hypophosphatemia  Most likely in setting of ETOH abuse. Phos 1.7 on admission.  - Monitor Q12H, on replacement protocol     # Fluids: s/p 3L NS, 1L LR, banana bag, and on D5/0.45NS @ 100/hr     ===HEME/ONC===  # Leukocytosis  Normal on presentation to OSH, elevated to >20 on repeat check after CPR and intubation. Stress response vs. Underlying infection  - Will continue to monitor, had one dose vanc/zosyn at [facility] ED  - Not currently on antibiotics, did have temp 101 @ 6/4/17 0700 AM  - If has fever again, will evaluate and restart antibiotics     ===ENDOCRINE===  # Hyperglycemia  Most likely 2/2 to stress. No prior hx of DM.  - Insulin gtt     ===INFECTIOUS DISEASE===  # No known infection (see leukocytosis above)     # Antimicrobials:  Received one dose of vanc and zosyn prior to transfer  - No antibiotics currently     ===SKIN/MSK===  # No active issues     LINES: R femoral triple-lumen CVC (6/3), L radial arterial line (6/3), puentes, 2 PIVs, OG     Prophylaxis:  DVT: pneumoboots   GI: pantoprazole 40 IV daily  Family:  Updated in person  Disposition: Critically Ill  Code Status: FULL    Patient was seen and discussed with attending physician Dr. Espinal, who agrees with above assessment and plan.    Gama García MD, MPH  PGY-1 [facility]  Pager: [phone]    MICU STAFF CRITICAL CARE PROGRESS NOTE:    I saw and examined the patient with the MICU team and concur with findings and A&P as detailed in Dr. García's note of today    See my independent note of today.    Gama Espinal MD  MICU Staff  2280      Interval History:     Jong Galarza improved today, currently not requiring pressor support,  acidemia corrected, bicarb trending up, doing well on pressure support, may be extubated.  Verified pill bottles with daughter, no significant amounts missing from recent prescriptions.  No other bottles/known ingestions other than alcohol, but was found to have high osmol gap (see above) making other ingestion likely.  Currently he is awake, responsive, when asked about pain he points to chest wall/epigastrium, likely secondary to recent CPR, will reevaluate when extubated.    4 Point Review of systems including CV, Respiratory, GI and  were negative unless otherwise noted.    PHYSICAL EXAM  Temp  Av.4  F (36.3  C)  Min: 94.6  F (34.8  C)  Max: 101.1  F (38.4  C)  Arterial Line MAP (mmHg)  Av.2 mmHg  Min: 42 mmHg  Max: 107 mmHg  Arterial Line BP  Min: 42/41  Max: 137/68      No Data Recorded Resp  Av.1  Min: 11  Max: 130  SpO2  Av.9 %  Min: 90 %  Max: 100 %  FiO2 (%)  Av.8 %  Min: 40 %  Max: 60 %       Intake/Output Summary (Last 24 hours) at 17 1059  Last data filed at 17 1000   Gross per 24 hour   Intake          4529.78 ml   Output             1720 ml   Net          2809.78 ml     GEN: Awake, opens eyes to voice, ready for extubation  EYES: PERR to light now (concern earlier today for non-reactive), EOMI  CV: RRR, no gallops, rubs, or mumurs  PULM: CTAB, symmetric chest rise  GI: Increased abdominal distension, normal bowel sounds, some epigastric/chest wall tenderness likely 2/2 CPR (will reassess after extubation)  : puentes catheter in place  EXTREMITIES: No pedal edema, moving all extremities, peripheral pulses intact  NEURO: Going to be extubated, opens eyes to voice, tracks, able to move all extremities, no motor-sensory deficits noted  SKIN: No rashes noted    DIAGNOSTIC STUDIES  ROUTINE ICU LABS (Last four results)  CMP  Recent Labs  Lab 17  1006 17  0351 17  2234 17  1811    147* 147* 149*   POTASSIUM 4.2 3.9 3.2* 2.8*   CHLORIDE  --   118* 118* 122*   CO2  --  12* 17* 14*   ANIONGAP  --  16* 13 13   GLC  --  219* 274* 126*   BUN  --  12 14 12   CR  --  0.85 1.12 0.96   GFRESTIMATED  --  >90Non  GFR Calc 66 78   GFRESTBLACK  --  >90African American GFR Calc 79 >90African American GFR Calc   BLANCA  --  7.4* 7.0* 5.8*   MAG  --  1.8 2.4* 1.6   PHOS  --  1.6* 1.7*  --    PROTTOTAL  --  5.2*  --  4.0*   ALBUMIN  --  2.6*  --  2.2*   BILITOTAL  --  0.3  --  0.3   ALKPHOS  --  53  --  42   AST  --  21  --  10   ALT  --  25  --  11     CBC  Recent Labs  Lab 06/04/17  0351 06/03/17  2234 06/03/17  1811   WBC 25.5* 21.6* 6.0   RBC 4.18* 4.21* 3.65*   HGB 13.1* 13.3 11.6*   HCT 38.7* 38.9* 33.6*   MCV 93 92 92   MCH 31.3 31.6 31.8   MCHC 33.9 34.2 34.5   RDW 12.9 12.9 12.9    201 174     INR  Recent Labs  Lab 06/04/17  0351 06/03/17  1811   INR 1.18* 1.13     Arterial Blood Gas  Recent Labs  Lab 06/04/17  0351 06/04/17  0102 06/03/17 1949   PH 7.20* 7.17* 7.14*   PCO2 30* 33* 46*   PO2 92 113* 102   HCO3 12* 12* 16*   O2PER 50 60.0  --        MICROBIOLOGY  All cultures:    Recent Labs  Lab 06/03/17  1940 06/03/17  1845   CULT No growth after 9 hours No growth after 9 hours       IMAGING  Recent Results (from the past 48 hour(s))   Chest  XR, 1 view PORTABLE    Narrative    CHEST PORTABLE ONE VIEW   6/3/2017 7:10 PM     HISTORY: Post intubation.    COMPARISON: 1/31/2006      Impression    IMPRESSION: The patient is intubated. The tip of ET tube is low being  0.5 cm above the arlene and projecting towards the right mainstem  bronchus. There is some prominence of the mediastinal region but this  is probably due to portable technique. No infiltrates are present.  Heart size is borderline large but this also could be due to  technique. Results called to referring physician.    PANCHO RODRIGUEZ MD   Cervical spine CT w/o contrast    Narrative    CT CERVICAL SPINE WITHOUT CONTRAST   6/3/2017 7:32 PM     HISTORY: Unresponsive.     TECHNIQUE: Axial  images of the cervical spine were obtained without  intravenous contrast. Multiplanar reformations were performed.  Radiation dose for this scan was reduced using automated exposure  control, adjustment of the mA and/or kV according to patient size, or  iterative reconstruction technique.     COMPARISON: None.    FINDINGS: Sagittal images demonstrate normal posterior alignment.  There is no evidence for fracture. Mild to moderate multilevel  degenerative disc and facet disease is present. Degenerative changes  are most advanced at C6-C7 where there is mild to moderate central  canal stenosis. Multilevel foraminal stenoses are also present. The  patient is intubated and also has a nasogastric tube or feeding tube.      Impression    IMPRESSION: Degenerative changes. No evidence for fracture or any  posterior malalignment.    PANCHO RODRIGUEZ MD   Head CT w/o contrast    Narrative    CT SCAN OF THE HEAD WITHOUT CONTRAST   6/3/2017 7:34 PM     HISTORY: AMS    TECHNIQUE:  Axial images of the head and coronal reformations without  IV contrast material.  Radiation dose for this scan was reduced using  automated exposure control, adjustment of the mA and/or kV according  to patient size, or iterative reconstruction technique.    COMPARISON: None.    FINDINGS: Mild to moderate cerebral atrophy is present. The brain  parenchyma otherwise appears normal. There is no evidence for  intracranial hemorrhage, mass effect, acute infarct, or skull  fracture. Some vascular calcifications noted at the skull base.      Impression    IMPRESSION: Cerebral atrophy. No evidence for intracranial hemorrhage  or any acute process.    PANCHO RODRIGUEZ MD   XR Chest Port 1 View    Narrative    EXAMINATION: XR CHEST PORT 1 VW  6/3/2017 10:50 PM      CLINICAL HISTORY: Endotracheal tube positioning    COMPARISON: Same date earlier radiograph        FINDINGS:  Interval retraction of endotracheal tube with the tip projecting 3.5  cm above arlene. Placement  of OG/NG feeding tube with the tip and  sidehole projecting over the stomach.    Cardiac silhouette within normal limits. Mild left retrocardiac  atelectasis. No pleural effusion or pneumothorax. Changes of  cholecystectomy in the upper abdomen.        Impression    IMPRESSION:  1. Interval retraction of endotracheal tube with the tip projecting  3.5 cm above the arlene.  2. Placement of OG/NG feeding tube with the tip and sidehole  projecting over the stomach.  3. Mild left retrocardiac atelectasis. Otherwise, no acute airspace  disease.    I have personally reviewed the examination and initial interpretation  and I agree with the findings.    PANCHO SOLOMON       Peripheral IV 06/03/17 Right Hand (Active)   Site Assessment WDL 6/4/2017  8:00 AM   Line Status Infusing 6/4/2017  8:00 AM   Phlebitis Scale 0-->no symptoms 6/4/2017  8:00 AM   Infiltration Scale 0 6/4/2017  8:00 AM   Extravasation? No 6/4/2017  8:00 AM   Dressing Intervention New dressing  6/4/2017  8:00 AM   Number of days:1       Peripheral IV 06/03/17 Left (Active)   Site Assessment WDL except;Bleeding 6/4/2017  8:00 AM   Line Status Infusing 6/4/2017  8:00 AM   Phlebitis Scale 0-->no symptoms 6/4/2017  8:00 AM   Infiltration Scale 0 6/4/2017  8:00 AM   Extravasation? No 6/4/2017  8:00 AM   Dressing Intervention New dressing  6/4/2017  8:00 AM   Number of days:1       Peripheral IV 06/03/17 Left Lower forearm (Active)   Site Assessment WDL 6/4/2017  8:00 AM   Line Status Saline locked 6/4/2017  8:00 AM   Phlebitis Scale 0-->no symptoms 6/4/2017  8:00 AM   Infiltration Scale 0 6/4/2017  8:00 AM   Extravasation? No 6/4/2017  8:00 AM   Dressing Intervention New dressing  6/4/2017  8:00 AM   Number of days:1       Arterial Line 06/04/17 Radial (Active)   Site Assessment WDL Except;Pink;Other (Comment);Ecchymotic 6/4/2017  8:00 AM   Line Status Pulsatile blood flow 6/4/2017  8:00 AM   Art Line Waveform Appropriate;Square wave test performed 6/4/2017  8:00  AM   Art Line Interventions Leveled;Connections checked and tightened;Flushed per protocol 6/4/2017  8:00 AM   Color/Movement/Sensation Capillary refill less than 3 sec 6/4/2017  8:00 AM   Dressing Type Transparent 6/4/2017  8:00 AM   Dressing Status Clean, dry, intact 6/4/2017  8:00 AM   Dressing Intervention Dressing changed/new dressing 6/4/2017  8:00 AM   Dressing Change Due 06/10/17 6/4/2017  8:00 AM   Number of days:0       CVC Triple Lumen 06/03/17 Right Femoral (Active)   Site Assessment WDL 6/4/2017  8:00 AM   Extravasation? No 6/4/2017  8:00 AM   Dressing Intervention Chlorhexidine sponge;Transparent 6/4/2017  8:00 AM   Dressing Change Due 06/10/17 6/4/2017  8:00 AM   CVC Lumen Assessment Blue;White;Brown 6/4/2017 12:00 AM   Number of days:1

## 2017-06-04 NOTE — PROGRESS NOTES
ETT pulled back 2 cm to 23 cm at the lip per physician verbal order.  6/3/2017  Kandace Dupont RRT

## 2017-06-04 NOTE — PROGRESS NOTES
MICU STAFF CRITICAL CARE PROGRESS NOTE:    I saw and examined the patient with the MICU team and concur with findings and A&P as detailed in Dr. Fox note of today    68 yo male last seen @ 2 PM who was found unresponsive at 5 PM by daughter with hypotension HR 50 labored breathing, adequate BS and no nearby pills.  Not responsive to Narcan.  Brought to [facility] and had transient chest compression w/ Sukhdeep with PEA and sustained pulse.  Intubated, placed on Norepi and Epi given empiric Abx  [facility] arrival MAP 50s and no spont breath initiations    PMH: depression on meds and sees Psychiatrist; EtOH abuse; release from incarceration     Home Meds: Ativan trazodone, benlifixine PPI sildenafil atorvastatin    Initially not moving at all  PERRL 3mm  Tachy RRR no m/g/r  Lungs clear  Abd soft nontender  Ext wnl    Hgb 11.6 WBC 6.0 Plt 174 INR 1.13  149 K 2.8 HCO3 12  Creat 0.96 Mg 1.6  Nl LFTs lactate 3.7  Nl lipase troponin  Tylenol 12 ASA neg  BC x2  Urine  EtOH 0.15  UA wnl no ketones  AB.14/46/102  Head CT and Cspine no acute changes  EKG sinus Tach  CXR wnl ETT OK position  Here PO4 1.7  Lactate 5.7  Nl Mariusz; Neg troponin glucose 274  7.17/33/113  WBC 21.6 Hgb 13.3 Plt 201K    This AM  Moving more (non-purposeful) and overbreathing with some agitation  Lactate 8.1  WBC 25K  4 AM 7.20/30/92 on 50%    Currently is on 3 pressors  Art line place  Given fluid bolus and improved MAP - now off Epi    Neuro  On fentanyl with prn versed boluses  P:  MRI and/or Head CT    Hypotension:  Now down to 2 pressors  3L NS 1L LR and now on D5 1/2NS  P:  Cardiac Echo pending  Follow CVP    Metabolic Acidosis / Drug Overdose  Methanol and EtGlycol levels pending  Daughter bringing in pill bottles    Electrolyte Abnormalities - K / PO4    Leukocytosis  Got doses of Vanco and Zosyn x 1      (Critical Care 45 minutes cumulative thus far today)    Gama Espinal MD  MICU Staff  0246

## 2017-06-04 NOTE — PLAN OF CARE
Problem: Goal Outcome Summary  Goal: Goal Outcome Summary  Outcome: Improving  D: Patient admitted from [facility] with positive BAL and AMS requiring intubation. Extubated today @ 1720.     I/A: Patient is alert, oriented to self only. Moves all extremities to command, strong and equal bilaterally. PERRL, 2/1. Reports chronic lower back pain with movement, denies pain at rest. Fentanyl drip DCd, PRN Fentanyl  q1 hour. T high 99.9 this shift. Extubated to 4L NC, strong productive cough, lungs coarse/clear in upper lobes, diminished lower lobes. NSR, no ectopy noted, all pressors weaned off, MAP > 65, radial art line in place. CVP = 11. Bowel sounds active, no BM since admission, abdomen rounded with pre-existing hernia. One episode of abdominal pain with increased firmness/tenderness to palpation- resolved spontaneously, no further complaints. Forman in place, urine output adequate. Insulin drip held for serial glucose in 70s- 1/2 amp D50 given. K/Mg/Phos replaced, recheck in a.m.     P: Increase activity as patient tolerates- PT/OT. Continue neuro checks q2 hours. Monitor for pain and treat accordingly. Wean O2 as tolerated. Encourage pulmonary toilet. Monitor abdomen-bowel regimen if no BM by tomorrow. Monitor glucose and electrolytes.      Will continue to monitor and assess.

## 2017-06-04 NOTE — PLAN OF CARE
Problem: Goal Outcome Summary  Goal: Goal Outcome Summary  Outcome: Improving  D: Pt admitted to [facility] from [facility] approx 2230 with AMS requiring intubation, hypotension requiring multiple pressors.     I/A: Pt remains on vent.  CMV 50/500/22/5.  Trending gases, see chart for details.  Lungs clear, dim to auscultation. Sats maintained >92% per MD order.  ST this shift up to 120s.  Required levo, vaso and epi most of the shift. Epi titrated off and levo titrating down.  Vaso remains at 2.4. MAPS >65 as ordered.  PERRLA.  Patient opens eyes to sternal rub.  Withdraws to pain.  Started on insulin gtt for hyperglycemia.  Fentanyl gtt at 25/hr.  PRN bumps given x2.  Versed 2 mg given x1 for tremors, increased temp, tachycardia.  MSSA protocol started per MD.  Awaiting further lab results to indicate what was ingested.  Daughter to bring in pill bottles this morning.  Lactic rising.  LR bolus x1 given with no response.  Banana bag hung, continues to run at 125/hr.  D5 0.45 /hr.  Na remains 147 at 0400. K replaced. Recheck in am. Mag 2 gm given.  Recheck at 1000.  Phos 20 mmol to be infused on day shift.         P: Continue with current level of support. Q2h neuro checks. MSSA scoring, PRN Versed and Fentanyl bumps.  Assess for future CT/MRI need.  Notify team of any changes.

## 2017-06-05 PROBLEM — F10.90 ALCOHOL USE DISORDER: Status: ACTIVE | Noted: 2017-06-05

## 2017-06-05 LAB
ACETAMINOPHEN QUAL: NEGATIVE
AMOBARBITAL QUAL: NEGATIVE
ANION GAP SERPL CALCULATED.3IONS-SCNC: 9 MMOL/L (ref 3–14)
BARBITAL QUAL: NEGATIVE
BUN SERPL-MCNC: 7 MG/DL (ref 7–30)
BUTABARBITAL QUAL: NEGATIVE
BUTALBITAL QUAL: NEGATIVE
CA-I BLD-MCNC: 4.9 MG/DL (ref 4.4–5.2)
CAFFEINE QUAL: POSITIVE
CALCIUM SERPL-MCNC: 8.2 MG/DL (ref 8.5–10.1)
CARBAMAZEPINE QUAL: NEGATIVE
CARISOPRODOL QUAL: NEGATIVE
CHLORIDE SERPL-SCNC: 110 MMOL/L (ref 94–109)
CHLORPROPAMIDE UR-MCNC: NEGATIVE UG/ML
CO2 SERPL-SCNC: 24 MMOL/L (ref 20–32)
CREAT SERPL-MCNC: 0.71 MG/DL (ref 0.66–1.25)
DRUGS SERPL SCN: NEGATIVE
ERYTHROCYTE [DISTWIDTH] IN BLOOD BY AUTOMATED COUNT: 13.2 % (ref 10–15)
ETHCLORVYNOL QUAL: NEGATIVE
ETHINAMATE QUAL: NEGATIVE
ETHOSUXIMIDE QUAL: NEGATIVE
ETHOTOIN QUAL: NEGATIVE
ETHYLENE GLYCOL SERPLBLD-MCNC: NORMAL UG/ML
GFR SERPL CREATININE-BSD FRML MDRD: ABNORMAL ML/MIN/1.7M2
GLUCOSE BLDC GLUCOMTR-MCNC: 113 MG/DL (ref 70–99)
GLUCOSE BLDC GLUCOMTR-MCNC: 120 MG/DL (ref 70–99)
GLUCOSE BLDC GLUCOMTR-MCNC: 125 MG/DL (ref 70–99)
GLUCOSE BLDC GLUCOMTR-MCNC: 97 MG/DL (ref 70–99)
GLUCOSE SERPL-MCNC: 108 MG/DL (ref 70–99)
GLUTETHIMIDE QUAL: NEGATIVE
HBA1C MFR BLD: 5.3 % (ref 4.3–6)
HCT VFR BLD AUTO: 38.8 % (ref 40–53)
HGB BLD-MCNC: 12.8 G/DL (ref 13.3–17.7)
IBUPROFEN QUAL: NEGATIVE
INR PPP: 0.98 (ref 0.86–1.14)
MAGNESIUM SERPL-MCNC: 2.1 MG/DL (ref 1.6–2.3)
MCH RBC QN AUTO: 30.8 PG (ref 26.5–33)
MCHC RBC AUTO-ENTMCNC: 33 G/DL (ref 31.5–36.5)
MCV RBC AUTO: 94 FL (ref 78–100)
MEPHENYTOIN QUAL: NEGATIVE
MEPHOBARBITAL QUAL: NEGATIVE
MEPROBAMATE QUAL: NEGATIVE
METHANOL BLD-MCNC: NORMAL MG/L
METHAQUALONE QUAL: NEGATIVE
METHARBITAL QUAL: NEGATIVE
METHSUXIMIDE QUAL: NEGATIVE
METHYPRYLON QUAL: NEGATIVE
PENTOBARBITAL QUAL: NEGATIVE
PHENACETIN QUAL: NEGATIVE
PHENOBARBITAL QUAL: NEGATIVE
PHENSUXIMIDE QUAL: NEGATIVE
PHENYTOIN QUAL: NEGATIVE
PHOSPHATE SERPL-MCNC: 1.9 MG/DL (ref 2.5–4.5)
PLATELET # BLD AUTO: 159 10E9/L (ref 150–450)
POTASSIUM SERPL-SCNC: 3.9 MMOL/L (ref 3.4–5.3)
POTASSIUM SERPL-SCNC: 4.1 MMOL/L (ref 3.4–5.3)
PRIMIDONE QUAL: NEGATIVE
RBC # BLD AUTO: 4.15 10E12/L (ref 4.4–5.9)
SALICYLATE QUAL: NEGATIVE
SECOBARBITAL QUAL: NEGATIVE
SODIUM SERPL-SCNC: 143 MMOL/L (ref 133–144)
SODIUM SERPL-SCNC: 143 MMOL/L (ref 133–144)
SPECIMEN SOURCE: NORMAL
TALBUTAL QUAL: NEGATIVE
THEOPHYLLINE QUAL: NEGATIVE
THIOPENTAL QUAL: NEGATIVE
TYBAMATE QUAL: NEGATIVE
VALPROIC ACID QUAL: ABNORMAL
WBC # BLD AUTO: 11.7 10E9/L (ref 4–11)

## 2017-06-05 PROCEDURE — 25000128 H RX IP 250 OP 636: Performed by: STUDENT IN AN ORGANIZED HEALTH CARE EDUCATION/TRAINING PROGRAM

## 2017-06-05 PROCEDURE — 84100 ASSAY OF PHOSPHORUS: CPT | Performed by: STUDENT IN AN ORGANIZED HEALTH CARE EDUCATION/TRAINING PROGRAM

## 2017-06-05 PROCEDURE — 84132 ASSAY OF SERUM POTASSIUM: CPT | Performed by: STUDENT IN AN ORGANIZED HEALTH CARE EDUCATION/TRAINING PROGRAM

## 2017-06-05 PROCEDURE — 80048 BASIC METABOLIC PNL TOTAL CA: CPT | Performed by: STUDENT IN AN ORGANIZED HEALTH CARE EDUCATION/TRAINING PROGRAM

## 2017-06-05 PROCEDURE — 83735 ASSAY OF MAGNESIUM: CPT | Performed by: STUDENT IN AN ORGANIZED HEALTH CARE EDUCATION/TRAINING PROGRAM

## 2017-06-05 PROCEDURE — 25000125 ZZHC RX 250: Performed by: STUDENT IN AN ORGANIZED HEALTH CARE EDUCATION/TRAINING PROGRAM

## 2017-06-05 PROCEDURE — 83036 HEMOGLOBIN GLYCOSYLATED A1C: CPT | Performed by: STUDENT IN AN ORGANIZED HEALTH CARE EDUCATION/TRAINING PROGRAM

## 2017-06-05 PROCEDURE — 12000001 ZZH R&B MED SURG/OB UMMC

## 2017-06-05 PROCEDURE — 25000132 ZZH RX MED GY IP 250 OP 250 PS 637: Mod: GY | Performed by: STUDENT IN AN ORGANIZED HEALTH CARE EDUCATION/TRAINING PROGRAM

## 2017-06-05 PROCEDURE — 84295 ASSAY OF SERUM SODIUM: CPT | Performed by: STUDENT IN AN ORGANIZED HEALTH CARE EDUCATION/TRAINING PROGRAM

## 2017-06-05 PROCEDURE — A9270 NON-COVERED ITEM OR SERVICE: HCPCS | Mod: GY | Performed by: STUDENT IN AN ORGANIZED HEALTH CARE EDUCATION/TRAINING PROGRAM

## 2017-06-05 PROCEDURE — 82330 ASSAY OF CALCIUM: CPT | Performed by: STUDENT IN AN ORGANIZED HEALTH CARE EDUCATION/TRAINING PROGRAM

## 2017-06-05 PROCEDURE — S5010 5% DEXTROSE AND 0.45% SALINE: HCPCS | Performed by: STUDENT IN AN ORGANIZED HEALTH CARE EDUCATION/TRAINING PROGRAM

## 2017-06-05 PROCEDURE — 40000895 ZZH STATISTIC SLP IP EVAL DEFER

## 2017-06-05 PROCEDURE — 25800025 ZZH RX 258: Performed by: STUDENT IN AN ORGANIZED HEALTH CARE EDUCATION/TRAINING PROGRAM

## 2017-06-05 PROCEDURE — 85027 COMPLETE CBC AUTOMATED: CPT | Performed by: STUDENT IN AN ORGANIZED HEALTH CARE EDUCATION/TRAINING PROGRAM

## 2017-06-05 PROCEDURE — 00000146 ZZHCL STATISTIC GLUCOSE BY METER IP

## 2017-06-05 PROCEDURE — 85610 PROTHROMBIN TIME: CPT | Performed by: STUDENT IN AN ORGANIZED HEALTH CARE EDUCATION/TRAINING PROGRAM

## 2017-06-05 RX ORDER — HYDROMORPHONE HYDROCHLORIDE 1 MG/ML
.5-1 INJECTION, SOLUTION INTRAMUSCULAR; INTRAVENOUS; SUBCUTANEOUS EVERY 4 HOURS PRN
Status: DISCONTINUED | OUTPATIENT
Start: 2017-06-05 | End: 2017-06-05

## 2017-06-05 RX ORDER — IBUPROFEN 400 MG/1
400 TABLET, FILM COATED ORAL EVERY 6 HOURS PRN
Qty: 21 TABLET | Refills: 0 | Status: SHIPPED | OUTPATIENT
Start: 2017-06-05 | End: 2017-06-06

## 2017-06-05 RX ORDER — ACETAMINOPHEN 500 MG
1000 TABLET ORAL EVERY 12 HOURS
Status: DISCONTINUED | OUTPATIENT
Start: 2017-06-05 | End: 2017-06-06

## 2017-06-05 RX ORDER — IBUPROFEN 400 MG/1
400 TABLET, FILM COATED ORAL EVERY 6 HOURS PRN
Status: DISCONTINUED | OUTPATIENT
Start: 2017-06-05 | End: 2017-06-06 | Stop reason: HOSPADM

## 2017-06-05 RX ORDER — LANOLIN ALCOHOL/MO/W.PET/CERES
100 CREAM (GRAM) TOPICAL DAILY
Status: DISCONTINUED | OUTPATIENT
Start: 2017-06-05 | End: 2017-06-06 | Stop reason: HOSPADM

## 2017-06-05 RX ORDER — LIDOCAINE 50 MG/G
3 PATCH TOPICAL
Status: DISCONTINUED | OUTPATIENT
Start: 2017-06-05 | End: 2017-06-06 | Stop reason: HOSPADM

## 2017-06-05 RX ORDER — MULTIVITAMIN,THERAPEUTIC
1 TABLET ORAL DAILY
Qty: 30 TABLET | Refills: 0 | Status: SHIPPED | OUTPATIENT
Start: 2017-06-05

## 2017-06-05 RX ORDER — MULTIVITAMIN,THERAPEUTIC
1 TABLET ORAL DAILY
Status: DISCONTINUED | OUTPATIENT
Start: 2017-06-05 | End: 2017-06-06 | Stop reason: HOSPADM

## 2017-06-05 RX ORDER — FOLIC ACID 1 MG/1
1 TABLET ORAL DAILY
Status: DISCONTINUED | OUTPATIENT
Start: 2017-06-05 | End: 2017-06-06 | Stop reason: HOSPADM

## 2017-06-05 RX ORDER — ACETAMINOPHEN 500 MG
1000 TABLET ORAL EVERY 12 HOURS
Qty: 28 TABLET | Refills: 0 | Status: SHIPPED | OUTPATIENT
Start: 2017-06-05 | End: 2017-06-06

## 2017-06-05 RX ORDER — PANTOPRAZOLE SODIUM 40 MG/1
40 TABLET, DELAYED RELEASE ORAL EVERY MORNING
Status: DISCONTINUED | OUTPATIENT
Start: 2017-06-06 | End: 2017-06-06 | Stop reason: HOSPADM

## 2017-06-05 RX ADMIN — ACETAMINOPHEN 1000 MG: 500 TABLET, FILM COATED ORAL at 10:16

## 2017-06-05 RX ADMIN — IBUPROFEN 400 MG: 400 TABLET ORAL at 17:47

## 2017-06-05 RX ADMIN — ACETAMINOPHEN 1000 MG: 500 TABLET, FILM COATED ORAL at 22:33

## 2017-06-05 RX ADMIN — LIDOCAINE 3 PATCH: 50 PATCH TOPICAL at 10:16

## 2017-06-05 RX ADMIN — THERA TABS 1 TABLET: TAB at 09:00

## 2017-06-05 RX ADMIN — POTASSIUM PHOSPHATE, MONOBASIC AND POTASSIUM PHOSPHATE, DIBASIC 20 MMOL: 224; 236 INJECTION, SOLUTION INTRAVENOUS at 09:02

## 2017-06-05 RX ADMIN — POTASSIUM CHLORIDE 20 MEQ: 29.8 INJECTION, SOLUTION INTRAVENOUS at 05:32

## 2017-06-05 RX ADMIN — FOLIC ACID 1 MG: 1 TABLET ORAL at 09:00

## 2017-06-05 RX ADMIN — DEXTROSE AND SODIUM CHLORIDE: 5; 450 INJECTION, SOLUTION INTRAVENOUS at 10:14

## 2017-06-05 RX ADMIN — DEXTROSE AND SODIUM CHLORIDE: 5; 450 INJECTION, SOLUTION INTRAVENOUS at 19:04

## 2017-06-05 RX ADMIN — FENTANYL CITRATE 100 MCG: 50 INJECTION INTRAMUSCULAR; INTRAVENOUS at 03:48

## 2017-06-05 RX ADMIN — Medication 100 MG: at 09:00

## 2017-06-05 RX ADMIN — PANTOPRAZOLE SODIUM 40 MG: 40 INJECTION, POWDER, FOR SOLUTION INTRAVENOUS at 09:00

## 2017-06-05 ASSESSMENT — PAIN DESCRIPTION - DESCRIPTORS: DESCRIPTORS: STABBING;OTHER (COMMENT)

## 2017-06-05 NOTE — PLAN OF CARE
Problem: Goal Outcome Summary  Goal: Goal Outcome Summary  Outcome: Improving  D: Patient admitted from [facility] with positive BAL and AMS requiring intubation.      I/A: Patient is alert and oriented x4, occasionally forgetful. Moves all extremities to command, strong and equal bilaterally, up with minimal assist. PERRL, 2/1. Reports sternal pain with movement, well controlled with rest/tylenol/IBU. T high 99.7 this shift. Room air with O2 sats mid 90s, strong productive cough, lungs clear in upper lobes, diminished lower lobes. NSR, no ectopy noted, SBP WNL. Bowel sounds active,abdomen rounded with pre-existing hernia, large BM x2 this shift. Advanced to regular diet, tolerating without difficulty. Forman removed, voiding without difficulty.  Femoral line removed. Per daughter, suicide note found this afternoon at patient's home. MD informed- 72 hour hold in place, suicide precautions, bedside attendant. Denies suicidal ideation at this time.      P: Ensure patient safety, continuous 1:1 sitter/suicide precautions. Psych consult. Increase activity as patient tolerates. Monitor for pain and treat accordingly. Monitor respiratory status, O2 PRN. Encourage pulmonary toilet. Encourage PO intake.     Will continue to monitor and assess.

## 2017-06-05 NOTE — PLAN OF CARE
Problem: Goal Outcome Summary  Goal: Goal Outcome Summary  ST 4C: Deferral- Orders received for swallow evaluation. Chart reviewed and spoke with RN. PT passed RN swallow screen without difficulty per report. Formal ST swallow evaluation not indicated at this time. Will sign off. Please re-consult if changes are noted.

## 2017-06-05 NOTE — PLAN OF CARE
Problem: Goal Outcome Summary  Goal: Goal Outcome Summary  Outcome: Improving  D: Pt to [facility] found down, intubated for airway protection.  Now extubated, off pressors.      I/A: Patient alert and increasingly more oriented.  Disoriented to situation.  Speech more clear throughout night. MSSA <8.  No valium given.  Pt tolerating extubation.  4L NC when sleeping to maintain sats >92%.  While awake, maintains sats on RA.  Lungs CTA bilaterally. Dim in bases.  NSR 70s-80s.  Blood pressures stable with adequate MAP >65. Pressors remain off.  CVP 10-11.  Arterial line discontinued.  No c/o abd tenderness/distention this shift.  No BM- patient should be started on bowel protocol if MD sees fit.  Forman in place, patient autodiuresing ~200cc/hr.  C/o lower back pain, states he has this at home.  PRN fentanyl with little improvement, MD ordered Dilaudid PRN which controlled pain.      P: Continue to support respiratory status as needed.  Pain control.

## 2017-06-05 NOTE — H&P
"[facility]    Internal Medicine History and Physical - Gold Service  Transfer Note       Date of Admission:  6/3/2017    Assessment & Plan   Jong Galarza is a 67 year old male admitted on 6/3/2017. He has a history of alcohol abuse, depression, GERD and hyperlipidemia and was admitted after he was found unresponsive at home.  He briefly received CPR at OSH during a hypotensive episode but never lost pulses.  He was then intubated, transferred to our ICU and is now transferring to the medicine service.    1) Altered mental status - Initially hypotensive to the point of requiring pressors and chest compressions, metabolically acidotic and briefly intubated for airway protection.  Now significantly more responsive but statements are bizarre and do not entirely follow.  Drug screen without a clear culprit.  Question benzodiazapine OD w/ ETOH given inconsistent urine screens with benzodiazepines.  Patient unwilling to contribute to history.  - Lactic acidosis resolved, leukocytosis improved.  Off antibiotics but did receive Zosyn/Vanco x1 on 6/3.  - Placed on 72 hour hold by ICU team after daughter found suicide note at home  - Consult psychiatry    2) History of alcohol abuse - Patient unwilling to provide any details save for \"I am an alcoholic.\"  - Will place on MSSA protocol w/diazepam given history of alcohol abuse and unclear recent drinking history  - Thiamine, folate, MVI    3) Chest pain - Likely secondary to trauma from Lucus device during brief episode of chest compressions at [facility].  - Ibuprofen PRN pain    Diet: Advance Diet as Tolerated: Regular Diet Adult  Diet  Fluids: D5 1/2 NS @ 100 ccs/hr  DVT Prophylaxis: SCDs  Code Status: Full    Disposition Plan   Expected discharge: 2 - 3 days; recommended to inpatient psychiatry once seen by psychiatry.     Entered: Johny Kiran 06/05/2017, 3:46 PM   Information in the above section will display in the " "discharge planner report.    The patient's care was discussed with the ICU team.    Johny Kiran  Internal Medicine Staff Hospitalist Service  [facility]  Pager: [phone]  Please see sticky note for cross cover information  ______________________________________________________________________    Chief Complaint   Found unresponsive    History of Present Illness   Jong Galarza is a 67 year old male admitted on 6/3/2017. He has a history of alcohol abuse, depression, GERD and hyperlipidemia and was admitted after he was found unresponsive at home.  He briefly received CPR at OSH during a hypotensive episode but never lost pulses.  He was then intubated, transferred to our ICU and is now transferring to the medicine service.    The patient was willing to talk to me but unwilling to answer any questions.  He reports he is an alcoholic, was insistent I write down that he has an appointment with his psychiatrist tomorrow at 11:45 and that he sees a neurologist.  Any questions I had were met with \"none of your business.\"    I reviewed his chart.  Per our ICU team and outside ED notes, the patient was found unresponsive at home by his daughter and brought to the ED.  There he was hypotensive and unresponsive and received a brief period of chest compressions, was intubated and started on pressors.  Labs were significant for a mixed but predominately metabolic acidosis with an elevated lactic acid.  Drug screens were negative.  Reportedly the patient had venlafaxine, trazodone and ativan available at home but was not missing any pills.  He does not have a known history of suicide attempts.    Our ICU team was planning on discharging him home today as he reportedly expressed he had no intention of not drinking and we were hoping to avoid putting him through unnecessary alcohol withdrawal, but his daughter found a suicide note at home so he was instead put on a 72 hour hold and transferred to our " "service for psychiatry evaluation.    Review of Systems   Review of systems not obtained due to patient factors - patient's refusal    Past Medical History    Alcohol abuse    Past Surgical History   Patient refused to review    Social History     Patient reports he is an  and prior notes suggest he has been disbarred and incarcerated.  He reports he is an alcoholic with \"more time in AA than you've been alive    Family History   Unwilling to share    Prior to Admission Medications   Prior to Admission Medications   Prescriptions Last Dose Informant Patient Reported? Taking?   TRAZodone (DESYREL) 100 MG tablet   Yes No   Sig: Take 2 tablets by mouth At Bedtime.   atorvastatin (LIPITOR) 20 MG tablet   Yes No   Sig: Take 20 mg by mouth daily.   lorazepam (ATIVAN) 1 MG tablet   Yes No   Sig: Take 1 mg by mouth daily.   rabeprazole (ACIPHEX) 20 MG tablet   Yes No   Sig: Take 20 mg by mouth daily.   sildenafil (VIAGRA) 100 MG tablet   Yes No   Sig: Take 100 mg by mouth daily as needed.   venlafaxine (EFFEXOR) 75 MG tablet   Yes No   Sig: Take 75 mg by mouth 2 times daily.      Facility-Administered Medications: None     Allergies   Allergies   Allergen Reactions     Sulfa Drugs        Physical Exam   Vital Signs: Temp: 99.6  F (37.6  C) Temp src: Oral BP: (!) 168/116   Heart Rate: 99 Resp: 20 SpO2: 95 % O2 Device: None (Room air) Oxygen Delivery: 4 LPM  Weight: 167 lbs 15.85 oz    Patient refused assessment.  He is an older gentleman resting in bed occassionally wincing and clutching a pillow to his chest.  He has no labored breathing and does not appear to be in significant distress save for when he attempts to roll over.  He refused to answer orientation questions and is generally oppositional with all questions.    Data   Data reviewed today: I reviewed all medications, new labs and imaging results over the last 24 hours. I personally reviewed past imaging .      "

## 2017-06-05 NOTE — PROGRESS NOTES
[facility]   Transfer Summary/     MICU Progress Note  Jong Galarza MRN: [id number]  1950  Date of Admission:6/3/2017  Primary care provider: No primary care provider on file.    Assessment and Plan:     68 yo M with hx of depression, ETOH abuse, HLD, GERD, and erectile dysfunction who was recently incarcerated for the last several months and released on 5/20/17, found unresponsive around 5PM on 6/3/17 and transferred to [facility], severe acidosis, intubated.  Now extubated, hemodynamically stable, patient was requesting discharge, suicide note found by daughter prior to discharge, on 72 hour hold, transfer to medicine service.    ===NEURO===  # Sedation: none currently  # AMS: resolving  # ETOH Abuse with Possible Co-Ingestion of Unknown Substance  Pt does have prescriptions for venlafaxine, trazodone, and Ativan. Negative for benzo on UDS on admission.  CT Head on admission with no acute intracranial abnormalities. UDS unremarkable except ETOH level of 0.15. Ammonia normal. Daughter brought in pill bottles, no significant amounts missing, no unreported medications.  No known aspiration, no seizure history.  - By 6/4/17 @ ~1800: Off all pressors, extubated  - Comprehensive drug screen: negative except caffeine  - Versed 1-2 mg Q1H IV PRN (currently requiring none >12 hours)  - [facility] protocol with diazepam  - Declined any referrals, social work visit, information regarding detox, rehab, or any adjunct services for alcohol use  - Q4H Neuro checks  - Consider repeat MRI if concerns over anoxic brain injury, no focal deficits at this time.    # Suicide attempt:  Note found by daughter as above.  He was recently disbarred () and incarcerated, being seen by Dr. Ortega at [facility] (Ph: [phone], fax: [phone]) in [place], requested initially that discharge summary be faxed to [facility], will need to readdress this after acute care management.  He denies suicide attempt or intent  to harm himself, denies ingestion of any substance aside from alcohol.  He would not stay for further stabilization or evaluation, stated a court order would be required to keep him in hospital. He currently appeared outwardly to be inappropriately unconcerned regarding his safety, seriousness of his recent admission, and suicide attempt.  - Placed on 72 hour hold start at 16:10 on 6/5/17  - Psychiatry consulted, Dr. Arredondo to see tonight or tomorrow  - Suicide precautions  - Bedside sitter  - Transfer to medicine service     ===CARDIOVASCULAR===  # Hypotension: resolved  # CPR at outside facility: ~ 3 min  Afebrile and no leukocytosis on admission, normal procal, and no previous symptoms of infection per daughter PTA to suspect sepsis. No prior cardiac hx. EKG with ST depressions in V3-V4, trop negative x2.  - Not requiring vasopressors as of 6/4/17 afternoon, previously on norepi, vasopressin, and epi gtts  - s/p 3L NS, 1L LR, banana bag, and then D5/0.45NS @ 100/hr  - Currently tolerating po intake  - Echo: EF 55-60%, no significant abnormalities     ===PULMONARY===  # Acute hypoxic respiratory failure:   # + AGAP, no osmol gap (accounting for ethanol), mixed metabolic and respiratory acidosis: resolved  - Intubated 2/2 Unable to Protect Airway  - Extubated 6/4/17  CXR unremarkable on admission.  Corrected AGAP of 17 at [facility]  No significant Osmol gap (correcting for ethanol = -10.4)  ABG at [facility] ED:pH 7.14 pCO2 46 pO2 102 Bicarb 16 (serum bicarb 14).  - Winter's formula: would have expected pCO2 of 27-31 for appropriate compensation  - Acidemia corrected s/p fluid resuscitation and ventilatory support  - likely due to ethanol ingestion +/- other unknown substance, causing metabolic acidosis and subsequent respiratory failure when unresponsive     ===GASTROINTESTINAL===  # No active issues      # Nutrition:   - passed bedside swallow by nursing staff  - ADAT  - will monitor phosphate and other  electrolytes closely given alcoholism     ===RENAL===  I/O last 3 completed shifts:  In: 3966.67 [P.O.:350; I.V.:3616.67]  Out: 8160 [Urine:8160]    # + AGAP, no osmol gap, Mixed Metabolic and Respiratory Acidosis (see pulmonary as well): resolved  # Lactic Acidosis: resolved  Most likely in setting of ETOH use +/- other unknown substance and poor respiration, patient reports only ingested 'cheap vodka and very old cranberry juice'.  Lactate gaby initially despite fluid resuscitation but has corrected rapidly since to normal. Neg ketones in urine. Neg salicylate level. Normal APAP. Negative basic UDS.  Did have chest compressions for 3 min at [facility] ED via Sukhdeep device, had palpable pulse as per report.  - Good urine output  - Ketone beta-hydroxybutyrate negative, ethylene, methanol, comprehensive drug screen negative      # Mild Hypernatremia: resolved   on admission, now wnl  - s/p IVFs as above  - Having increased urine output, will trend sodium in case of post-MARC diuresis or Central DI      # Hypokalemia: resolved  K 2.8 on presentation, now wnl  - replacement protocol  - BMP q12h      # Hypophosphatemia:  Most likely in setting of ETOH abuse. Phos 1.7 on admission  - Replacement protocol      # Fluids:   - s/p 3L NS, 1L LR, banana bag on admission, D5/0.45NS @ 100/hr  - Now tolerating po intake      ===HEME/ONC===  # Leukocytosis: improving  Normal on presentation to OSH, elevated to >20 on repeat check after CPR and intubation. Likely stress response given rapid correction not on antibiotics, has been afebrile >24 hours.  - Will continue to monitor, had one dose vanc/zosyn at [facility] ED  - Not currently on antibiotics, did have temp 101 @ 6/4/17 0700 AM, afebrile since, WBC downtrending  - If becomes febrile/WBC increasing, will evaluate and restart antibiotics as indicated      ===ENDOCRINE===  # Hyperglycemia: resolved  Most likely 2/2 to stress. No prior hx of DM.  - Insulin  gtt      ===INFECTIOUS DISEASE===  # No known infection (see leukocytosis above)      # Antimicrobials:  Received one dose of vanc and zosyn prior to transfer  - No antibiotics currently      ===SKIN/MSK===  # Chronic low back and shoulder pain  # Acute chest wall pain s/p CPR: no flail chest  - Oral tylenol 1000mg q12h prn (not to exceed 2g given alcohol use/liver)  - Oral ibuprofen 400mg q6h prn (MARC resolved)  - Lidoderm patches q12h prn  - Menthol patches prn  - If still uncontrolled can do oral dilaudid, avoid opioids as possible given substance use history and recent AMS/respiratory failure      LINES: L radial arterial line (6/3-6/4), Femoral CVC (6/3-), puentes (removed), 2 PIVs      Prophylaxis:  DVT: pneumoboots   GI: pantoprazole 40 IV daily  Family:  Updated in person  Disposition: 72 hour hold  Code Status: FULL    Patient was seen and discussed with attending physician Dr. Daily, who agrees with above assessment and plan.    Gama García MD, MPH  PGY-1 [facility]  Pager: [phone]    Interval History:     Jong Galarza did well overnight post-extubation.  Hemodynamically stable today, was more alert and oriented x3, tolerated po intake.  He was requesting discharge, was medically stable, suggested staying for further stabilization given seriousness of recent admission, patient adamant that he wished to leave.  He declined any resources/referrals/interventions regarding alcohol use.  He denied any suicidal ideation, stated he only drank some cheap vodka mixed with old cranberry juice.  Discharge being planned when daughter called, found suicide note at home.  Discussed with patient, he denied writing note or any suicide attempt.  Discussed concerns regarding safety and that we would like full psychiatric evaluation for safety, he declined and stated he wanted to leave and that we would have to place court order to hold him, referenced his legal background.  Given concerns over  possible suicide attempt, severe acidosis leading to intubation and CPR, patient not concerned about alcohol use and severity of current admission, placed on 72 hour hold.    4 Point Review of systems including CV, Respiratory, GI and  were negative unless otherwise noted.    PHYSICAL EXAM  /89  Temp 98.7  F (37.1  C) (Oral)  Resp 20  Wt 76.2 kg (167 lb 15.9 oz)  SpO2 95%  BMI 24.81 kg/m2     I/O last 3 completed shifts:  In: 3966.67 [P.O.:350; I.V.:3616.67]  Out: 8160 [Urine:8160]    GEN: Awake, opens eyes to voice, up in chair at times  EYES: PERRL, EOMI  CV: RRR, no gallops, rubs, or mumurs  PULM: CTAB, symmetric chest rise  GI: No increase in distension, bowel sounds present, some epigastric/chest wall tenderness likely 2/2 CPR  : Forman removed  EXTREMITIES: No pedal edema, moving all extremities, peripheral pulses intact  NEURO: Alcohol use disorder, has poor understanding of severity of illness and appears unconcerned with severity of recent episode. Denies any desire to harm himself previously or currently.  He was focused today on leaving hospital.  Declined any referrals, social work visit, information regarding detox, rehab, or any adjunct services for alcohol use.  After suicide not found: Denied suicide attempt, writing note, ingestion of any other substances, intent to harm himself.  Requested to leave hospital, discussed safety concerns, he appeared unconcerned outwardly about recent admission/probable suicide attempt.  Deemed active danger to himself, placed on 72 hour hold as he stated would not a court order to keep him here.    DIAGNOSTIC STUDIES  ROUTINE ICU LABS (Last four results)  CMP  Recent Labs  Lab 06/05/17  1113 06/05/17  0354 06/04/17  2105 06/04/17  1614  06/04/17  0351 06/03/17  2234 06/03/17  1811    143 144 141  < > 147* 147* 149*   POTASSIUM 4.1 3.9 4.2 3.8  < > 3.9 3.2* 2.8*   CHLORIDE  --  110*  --  114*  --  118* 118* 122*   CO2  --  24  --  22  --  12* 17*  14*   ANIONGAP  --  9  --  6  --  16* 13 13   GLC  --  108*  --  111*  --  219* 274* 126*   BUN  --  7  --  10  --  12 14 12   CR  --  0.71  --  0.84  --  0.85 1.12 0.96   GFRESTIMATED  --  >90Non  GFR Calc  --  >90Non  GFR Calc  --  >90Non  GFR Calc 66 78   GFRESTBLACK  --  >90African American GFR Calc  --  >90African American GFR Calc  --  >90African American GFR Calc 79 >90African American GFR Calc   BLANCA  --  8.2*  --  7.6*  --  7.4* 7.0* 5.8*   MAG  --  2.1  --  1.9  --  1.8 2.4* 1.6   PHOS  --  1.9*  --  2.2*  --  1.6* 1.7*  --    PROTTOTAL  --   --   --   --   --  5.2*  --  4.0*   ALBUMIN  --   --   --   --   --  2.6*  --  2.2*   BILITOTAL  --   --   --   --   --  0.3  --  0.3   ALKPHOS  --   --   --   --   --  53  --  42   AST  --   --   --   --   --  21  --  10   ALT  --   --   --   --   --  25  --  11   < > = values in this interval not displayed.  CBC    Recent Labs  Lab 06/05/17  0354 06/04/17  1614 06/04/17 0351 06/03/17  2234   WBC 11.7* 12.4* 25.5* 21.6*   RBC 4.15* 3.90* 4.18* 4.21*   HGB 12.8* 12.2* 13.1* 13.3   HCT 38.8* 35.7* 38.7* 38.9*   MCV 94 92 93 92   MCH 30.8 31.3 31.3 31.6   MCHC 33.0 34.2 33.9 34.2   RDW 13.2 13.2 12.9 12.9    155 217 201     INR    Recent Labs  Lab 06/05/17  0354 06/04/17  0351 06/03/17  1811   INR 0.98 1.18* 1.13     Arterial Blood Gas    Recent Labs  Lab 06/04/17  1614 06/04/17  1253 06/04/17  0351 06/04/17  0102   PH 7.33* 7.47* 7.20* 7.17*   PCO2 34* 23* 30* 33*   PO2 91 138* 92 113*   HCO3 18* 16* 12* 12*   O2PER 30.0 45.0 50 60.0       MICROBIOLOGY  All cultures:    Recent Labs  Lab 06/03/17  1940 06/03/17  1845   CULT No growth after 2 days No growth after 2 days       IMAGING  Recent Results (from the past 48 hour(s))   Chest  XR, 1 view PORTABLE    Narrative    CHEST PORTABLE ONE VIEW   6/3/2017 7:10 PM     HISTORY: Post intubation.    COMPARISON: 1/31/2006      Impression    IMPRESSION: The patient is  intubated. The tip of ET tube is low being  0.5 cm above the arlene and projecting towards the right mainstem  bronchus. There is some prominence of the mediastinal region but this  is probably due to portable technique. No infiltrates are present.  Heart size is borderline large but this also could be due to  technique. Results called to referring physician.    PANCHO RODRIGUEZ MD   Cervical spine CT w/o contrast    Narrative    CT CERVICAL SPINE WITHOUT CONTRAST   6/3/2017 7:32 PM     HISTORY: Unresponsive.     TECHNIQUE: Axial images of the cervical spine were obtained without  intravenous contrast. Multiplanar reformations were performed.  Radiation dose for this scan was reduced using automated exposure  control, adjustment of the mA and/or kV according to patient size, or  iterative reconstruction technique.     COMPARISON: None.    FINDINGS: Sagittal images demonstrate normal posterior alignment.  There is no evidence for fracture. Mild to moderate multilevel  degenerative disc and facet disease is present. Degenerative changes  are most advanced at C6-C7 where there is mild to moderate central  canal stenosis. Multilevel foraminal stenoses are also present. The  patient is intubated and also has a nasogastric tube or feeding tube.      Impression    IMPRESSION: Degenerative changes. No evidence for fracture or any  posterior malalignment.    PANCHO RODRIGUEZ MD   Head CT w/o contrast    Narrative    CT SCAN OF THE HEAD WITHOUT CONTRAST   6/3/2017 7:34 PM     HISTORY: AMS    TECHNIQUE:  Axial images of the head and coronal reformations without  IV contrast material.  Radiation dose for this scan was reduced using  automated exposure control, adjustment of the mA and/or kV according  to patient size, or iterative reconstruction technique.    COMPARISON: None.    FINDINGS: Mild to moderate cerebral atrophy is present. The brain  parenchyma otherwise appears normal. There is no evidence for  intracranial hemorrhage,  mass effect, acute infarct, or skull  fracture. Some vascular calcifications noted at the skull base.      Impression    IMPRESSION: Cerebral atrophy. No evidence for intracranial hemorrhage  or any acute process.    PANCHO RODRIGUEZ MD   XR Chest Port 1 View    Narrative    EXAMINATION: XR CHEST PORT 1 VW  6/3/2017 10:50 PM      CLINICAL HISTORY: Endotracheal tube positioning    COMPARISON: Same date earlier radiograph        FINDINGS:  Interval retraction of endotracheal tube with the tip projecting 3.5  cm above arlene. Placement of OG/NG feeding tube with the tip and  sidehole projecting over the stomach.    Cardiac silhouette within normal limits. Mild left retrocardiac  atelectasis. No pleural effusion or pneumothorax. Changes of  cholecystectomy in the upper abdomen.        Impression    IMPRESSION:  1. Interval retraction of endotracheal tube with the tip projecting  3.5 cm above the arlene.  2. Placement of OG/NG feeding tube with the tip and sidehole  projecting over the stomach.  3. Mild left retrocardiac atelectasis. Otherwise, no acute airspace  disease.    I have personally reviewed the examination and initial interpretation  and I agree with the findings.    PANCHO SOLOMON       Peripheral IV 06/03/17 Right Hand (Active)   Site Assessment WDL 6/4/2017  8:00 AM   Line Status Infusing 6/4/2017  8:00 AM   Phlebitis Scale 0-->no symptoms 6/4/2017  8:00 AM   Infiltration Scale 0 6/4/2017  8:00 AM   Extravasation? No 6/4/2017  8:00 AM   Dressing Intervention New dressing  6/4/2017  8:00 AM   Number of days:1       Peripheral IV 06/03/17 Left (Active)   Site Assessment WDL except;Bleeding 6/4/2017  8:00 AM   Line Status Infusing 6/4/2017  8:00 AM   Phlebitis Scale 0-->no symptoms 6/4/2017  8:00 AM   Infiltration Scale 0 6/4/2017  8:00 AM   Extravasation? No 6/4/2017  8:00 AM   Dressing Intervention New dressing  6/4/2017  8:00 AM   Number of days:1       Peripheral IV 06/03/17 Left Lower forearm (Active)   Site  Assessment WDL 6/4/2017  8:00 AM   Line Status Saline locked 6/4/2017  8:00 AM   Phlebitis Scale 0-->no symptoms 6/4/2017  8:00 AM   Infiltration Scale 0 6/4/2017  8:00 AM   Extravasation? No 6/4/2017  8:00 AM   Dressing Intervention New dressing  6/4/2017  8:00 AM   Number of days:1       Arterial Line 06/04/17 Radial (Active)   Site Assessment WDL Except;Pink;Other (Comment);Ecchymotic 6/4/2017  8:00 AM   Line Status Pulsatile blood flow 6/4/2017  8:00 AM   Art Line Waveform Appropriate;Square wave test performed 6/4/2017  8:00 AM   Art Line Interventions Leveled;Connections checked and tightened;Flushed per protocol 6/4/2017  8:00 AM   Color/Movement/Sensation Capillary refill less than 3 sec 6/4/2017  8:00 AM   Dressing Type Transparent 6/4/2017  8:00 AM   Dressing Status Clean, dry, intact 6/4/2017  8:00 AM   Dressing Intervention Dressing changed/new dressing 6/4/2017  8:00 AM   Dressing Change Due 06/10/17 6/4/2017  8:00 AM   Number of days:0       CVC Triple Lumen 06/03/17 Right Femoral (Active)   Site Assessment WDL 6/4/2017  8:00 AM   Extravasation? No 6/4/2017  8:00 AM   Dressing Intervention Chlorhexidine sponge;Transparent 6/4/2017  8:00 AM   Dressing Change Due 06/10/17 6/4/2017  8:00 AM   CVC Lumen Assessment Blue;White;Brown 6/4/2017 12:00 AM   Number of days:1

## 2017-06-05 NOTE — DISCHARGE SUMMARY
UPDATE: 3:49 PM on June 5, 2017  Following Discharge Summary prior to information regarding suicide note, will not be discharged today,patient kept on 72 hour hold and transferred                            MICU  Discharge Summary    Jong Galarza MRN# [id number]   Age: 67 year old YOB: 1950     Date of Admission:  6/3/2017  Date of Discharge:  6/5/2017  Admitting Physician:  Brianna Morris MD  Discharge Physician:  Montana Daily MD  Discharging Service:  MICU     Primary Provider:   No primary care provider on file.    Patient gave psychiatrist name and permission to contact:   Dr. Ortega at [facility]  Ph: [phone]  Fax: [phone]    Appointment set for Tuesday June 6th, 2017 @ 1140 AM            Discharge Disposition:   Discharged to home, urged close follow up with psychiatry (as above), significant concern for patient continuing alcohol use, declined detox and any referral for rehab services.           Condition on Discharge:   Discharge condition: Satisfactory   Code status on discharge: Full Code          Admission Diagnoses:   respiratory distress  Unresponsive          Principle Discharge Problem:   Alcohol use disorder  Respiratory Distress: resolved  Altered Mental Status: resolved         Additional Discharge Problems:     Patient Active Problem List    Diagnosis Date Noted     Alcohol use disorder (H) 06/05/2017     Priority: Medium     Unresponsive 06/03/2017     Priority: Medium            Medications Prior to Admission:       No current facility-administered medications on file prior to encounter.   Current Outpatient Prescriptions on File Prior to Encounter:  rabeprazole (ACIPHEX) 20 MG tablet Take 20 mg by mouth daily.   atorvastatin (LIPITOR) 20 MG tablet Take 20 mg by mouth daily.   lorazepam (ATIVAN) 1 MG tablet Take 1 mg by mouth daily.   TRAZodone (DESYREL) 100 MG tablet Take 2 tablets by mouth At Bedtime.    venlafaxine (EFFEXOR) 75 MG tablet Take 75 mg by mouth 2 times daily.   sildenafil (VIAGRA) 100 MG tablet Take 100 mg by mouth daily as needed.            Discharge Medications:        Review of your medicines      START taking       Dose / Directions    acetaminophen 500 MG tablet   Commonly known as:  TYLENOL   Used for:  Chest wall pain        Dose:  1000 mg   Take 2 tablets (1,000 mg) by mouth every 12 hours   Quantity:  28 tablet   Refills:  0       ibuprofen 400 MG tablet   Commonly known as:  ADVIL/MOTRIN   Used for:  Chest wall pain        Dose:  400 mg   Take 1 tablet (400 mg) by mouth every 6 hours as needed for moderate pain   Quantity:  21 tablet   Refills:  0       menthol 5 % Ptch   Commonly known as:  ICY HOT   Used for:  Chest wall pain        Dose:  1 patch   Apply 1 patch topically every 8 hours as needed for muscle soreness   Quantity:  14 patch   Refills:  0       multivitamin, therapeutic Tabs tablet        Dose:  1 tablet   Take 1 tablet by mouth daily   Quantity:  30 tablet   Refills:  0         CONTINUE these medicines which have NOT CHANGED       Dose / Directions    ACIPHEX 20 MG EC tablet   Generic drug:  RABEprazole        Dose:  20 mg   Take 20 mg by mouth daily.   Refills:  0       LIPITOR 20 MG tablet   Generic drug:  atorvastatin        Dose:  20 mg   Take 20 mg by mouth daily.   Refills:  0       LORazepam 1 MG tablet   Commonly known as:  ATIVAN        Dose:  1 mg   Take 1 mg by mouth daily.   Refills:  0       traZODone 100 MG tablet   Commonly known as:  DESYREL        Dose:  2 tablet   Take 2 tablets by mouth At Bedtime.   Refills:  0       venlafaxine 75 MG tablet   Commonly known as:  EFFEXOR        Dose:  75 mg   Take 75 mg by mouth 2 times daily.   Refills:  0       VIAGRA 100 MG cap/tab   Generic drug:  sildenafil        Dose:  100 mg   Take 100 mg by mouth daily as needed.   Refills:  0            Where to get your medicines      These medications were sent to [facility] - [place], [place] - [address]  [address], [facility] [address]     Phone:  [phone]      acetaminophen 500 MG tablet     ibuprofen 400 MG tablet     menthol 5 % Ptch     multivitamin, therapeutic Tabs tablet                  Discharge Instructions and Follow-Up:   Discharge diet: Regular   Discharge activity: Activity as tolerated will likely    Discharge follow-up: Follow up with Dr. Ortega at [facility] in [place] on Tuesday June 6th, 2017 @ 1140 AM (appointment scheduled and discharge summary faxed as per your request).  Phone: [phone]   Lines and drains: None    Wound care: None          Brief Admission History and Evaluation:     Jong Galarza is a 67 year old male with hx of depression, ETOH abuse, HLD, GERD, and erectile dysfunction who was recently incarcerated (released on 5/20/17), who was found unresponsive with agonal respirations, brought in by EMS to [facility], intubated, placed on vasopressors, never had loss of pulse, though did get short term compressions for profound hypotension and bradycardia.  He remained critically ill with vasodilatory shock and coma, transferred to [facility] for further care.    Pt's daughter reports she saw him about three hours earlier in the day, and he was acting like his usual self with no strange behavior or complaints, and then when she returned to the house around 5PM, she found him unresponsive in his chair. She called 911, and when EMS arrived, his initial BP was 64/40 with pulse in the 50s. A blood sugar at that time was 188. There was no evidence of trauma, and no pill bottles or liquor bottles were near the Pt. Pt's breathing was very labored and near agonal at that time. Pt got 4 mg of Narcan at the scene, but no response. He was brought to [facility] ED for further cares.   Pt's daughter reports he is a recovering alcoholic with no known cardiac history, and she knows  he was not drinking while incarcerated for three months, but was not aware he had started drinking again until she found out he had ETOH in his system at the outside hospital. She did go home and found some bottles of alcohol that were empty in the house, but did not know when he drank them. She is unsure of what medications he is on, but knows he takes several including meds for depression and anxiety. She reports the Pt does see a Psychiatrist regularly. Pt has never tried to harm himself or attempt suicide in the past, and his daughter denies any strange behavior or conversations with the Pt recently regarding depressed mood. She does report she lives with the Pt, but is not with him all the time, and he has a poor support system. Pt's daughter reports he has never abused or to her knowledge used any other drugs than alcohol.  At the [facility] ED, initially tried positive pressure ventilation, then got 2L bolus of NS, then when MAPs were in the 40s with weak pulse, chest compressions were started and had three minutes of compressions with the Johnathan machine. Pt received three doses of 0.5 mg of epi and started on levophed gtt. Pt was then intubated. A central line was placed in his right femoral vein. Pt was started on an epi gtt. Given dose of vanc and zosyn. Pt got 1 g of calcium gluconate, 20 mEq of IV K, and 2 g of Mag sulfate. Pt never lost a pulse. Transferred to [facility] for ongoing cares.          Hospital Course/Discharge Plan by Problem:   ===NEURO===  # Sedation: none currently  # AMS: resolved  # ETOH Abuse: acute on chronic  Pt does have prescriptions for venlafaxine, trazodone, and Ativan. Negative for benzo on UDS on admission.  CT Head on admission with no acute intracranial abnormalities. UDS unremarkable except ETOH level of 0.15. Ammonia normal. Daughter brought in pill bottles, no significant amounts missing, no unreported medications.  No known aspiration, no seizure history.   - By 6/4/17 @  ~1800: Off all pressors, extubated  - Comprehensive drug screen: negative except caffeine  - Versed 1-2 mg Q1H IV PRN (currently requiring none >12 hours)  - MSSA protocol with diazepam: no active signs of withdrawal    Declined any referrals, social work visit, information regarding detox, rehab, or any adjunct services for alcohol use.        ===CARDIOVASCULAR===  # Hypotension: resolved  # CPR at outside facility: ~ 3 min  Afebrile and no leukocytosis on admission, normal procal, and no previous symptoms of infection per daughter PTA to suspect sepsis. No prior cardiac hx. EKG with ST depressions in V3-V4, trop negative x2.  - Not requiring vasopressors as of 6/4/17 afternoon, previously on norepi, vasopressin, and epi gtts  - s/p 3L NS, 1L LR, banana bag, and then D5/0.45NS @ 100/hr  - Currently tolerating po intake  - Echo: EF 55-60%, no significant abnormalities      ===PULMONARY===  # Intubated 2/2 Unable to Protect Airway: Extubated 6/4/17  # + AGAP, no osmol gap (accounting for ethanol), mixed metabolic and respiratory acidosis: resolved  CXR unremarkable on admission.  Corrected AGAP of 17 at [facility]  No significant Osmol gap (correcting for ethanol = -10.4)  ABG at [facility] ED:pH 7.14 pCO2 46 pO2 102 Bicarb 16 (serum bicarb 14).  - Winter's formula: would have expected pCO2 of 27-31 for appropriate compensation  - Acidemia corrected s/p fluid resuscitation and ventilatory support, likely due to ethanol ingestion causing metabolic acidosis and subsequent respiratory failure when unresponsive      ===GASTROINTESTINAL===  # No active issues      # Nutrition:   - passed bedside swallow by nursing staff  - ADAT  - will monitor phosphate and other electrolytes closely given alcoholism      ===RENAL===  I/O last 3 completed shifts:  In: 4328.6 [I.V.:4298.6; NG/GT:30]  Out: 5590 [Urine:5490; Emesis/NG output:100]     # + AGAP, no osmol gap, Mixed Metabolic and Respiratory Acidosis (see pulmonary as well):  resolved  # Lactic Acidosis: resolved  Most likely in setting of ETOH abuse and poor respiration, patient reports only ingested 'cheap vodka and very old cranberry juice'.  Lactate gaby initially despite fluid resuscitation but has corrected rapidly since to normal. Neg ketones in urine. Neg salicylate level. Normal APAP. Negative basic UDS.  Did have chest compressions for 3 min at [facility] ED via Sukhdeep device, had palpable pulse as per report.  - Lactic acid now wnl, serum bicarb normal  - Good urine output  - Ketone beta-hydroxybutyrate negative, ethylene, methanol, comprehensive drug screen negative      # Mild Hypernatremia: resolved   on admission, now wnl  - s/p IVFs as above  - Having increased urine output, will trend sodium in case of post-MARC diuresis vs. Central DI      # Hypokalemia: resolved  K 2.8 on presentation, now wnl  - replacement protocol  - BMP q12h      # Hypophosphatemia: resolved  Most likely in setting of ETOH abuse. Phos 1.7 on admission  - Monitor Q12H, on replacement protocol      # Fluids:   - s/p 3L NS, 1L LR, banana bag on admission, D5/0.45NS @ 100/hr  - Now tolerating po intake      ===HEME/ONC===  # Leukocytosis: improving  Normal on presentation to OSH, elevated to >20 on repeat check after CPR and intubation. Likely stress response given rapid correction not on antibiotics, has been afebrile >24 hours.  - Will continue to monitor, had one dose vanc/zosyn at [facility] ED  - Not currently on antibiotics, did have temp 101 @ 6/4/17 0700 AM, afebrile since, WBC downtrending      ===ENDOCRINE===  # Hyperglycemia: resolved  Most likely 2/2 to stress. No prior hx of DM.  - Insulin gtt      ===INFECTIOUS DISEASE===  # No known infection (see leukocytosis above)      # Antimicrobials:  Received one dose of vanc and zosyn prior to transfer  - No antibiotics currently      ===SKIN/MSK===  # Chronic low back and shoulder pain  # Acute chest wall pain s/p CPR: no flail chest  - Oral  tylenol 1000mg q12h prn (not to exceed 2g given alcohol use/liver)  - Oral ibuprofen 400mg q6h prn ([facility] resolved)  - Lidoderm patches q12h prn  - Menthol patches prn  - If still uncontrolled can do oral dilaudid, avoid opioids as possible given substance use history and recent extubation      LINES: All removed prior to discharge L radial arterial line (6/3-6/4), Femoral CVC (6/3-6/5), puentes (removed), 2 PIVs      Prophylaxis:  DVT: pneumoboots   GI: pantoprazole 40 IV daily  Code Status: FULL         Final Day of Progress before Discharge:         Interval History:  General:  feels better, more alert and more responsive   Vitals: vitals signs have been stable, no fever and no signficant change in respiratory or heart rates   Intake/Output: improved hydration, improved urinary output and tolerating oral intake   Nutrition: tolerating an advancing diet   Mental status: improved cognition and overall mental status, more alert, more responsive and less confused   Respiratory: improved respiratory effort and improved oxygenation   Cardiovascular blood pressure improved, no palpitations, blood pressure stable and heart rate stable   Renal: improved renal function and urinary output   Neurologic: improved motor function, improved speech, responding better to verbal requests, no focal deficits reported and no changes in the cranial nerves   Medications: Vasopressors discontinued and sedation weaned >12 hours prior to discharge, not requiring any hemodynamic support on discharge   Interventions: No interventions performed since the last visit   Consults: No consultations were requested since the last visit           Physical Exam:  Vitals were reviewed  Patient Vitals for the past 8 hrs:   BP Temp Temp src Heart Rate Resp SpO2   06/05/17 1300 (!) 145/93 - - 83 20 93 %   06/05/17 1200 150/81 99.6  F (37.6  C) Oral 109 - 93 %   06/05/17 1100 134/82 - - 84 - 93 %   06/05/17 1000 (!) 141/111 - - 85 - 94 %   06/05/17 0900 155/87  - - 94 - 97 %   06/05/17 0800 150/86 99.7  F (37.6  C) Bladder 96 20 97 %     GEN: Awake, opens eyes to voice, able to answer questions and make needs known  EYES: PERRL, EOMI  CV: RRR, no gallops, rubs, or mumurs  PULM: CTAB, symmetric chest rise, tenderness of anterior chest wall likely 2/2 CPR, no signs of flail chest or overt deformity  GI: No distension, normal bowel sounds, non-tender, no rebound or guarding  : puentes catheter in place  EXTREMITIES: No pedal edema, moving all extremities, peripheral pulses intact  NEURO: AOx3, opens eyes to voice, tracks, able to move all extremities, no motor-sensory deficits noted, cranial nerves 2-12 grossly intact  SKIN: No rashes noted  Psych: Alcohol use disorder, has poor understanding of severity of illness and appears unconcerned with severity of recent episode. Denies any desire to harm himself previously or currently.  He was focused today on leaving hospital.  Declined any referrals, social work visit, information regarding detox, rehab, or any adjunct services for alcohol use.                Data:  Lab Results   Component Value Date    WBC 11.7 (H) 06/05/2017    WBC 12.4 (H) 06/04/2017    WBC 25.5 (H) 06/04/2017    HGB 12.8 (L) 06/05/2017    HGB 12.2 (L) 06/04/2017    HGB 13.1 (L) 06/04/2017    HCT 38.8 (L) 06/05/2017    HCT 35.7 (L) 06/04/2017    HCT 38.7 (L) 06/04/2017     06/05/2017     06/04/2017     06/04/2017     06/05/2017     06/05/2017     06/04/2017    POTASSIUM 4.1 06/05/2017    POTASSIUM 3.9 06/05/2017    POTASSIUM 4.2 06/04/2017    CHLORIDE 110 (H) 06/05/2017    CHLORIDE 114 (H) 06/04/2017    CHLORIDE 118 (H) 06/04/2017    CO2 24 06/05/2017    CO2 22 06/04/2017    CO2 12 (L) 06/04/2017    BUN 7 06/05/2017    BUN 10 06/04/2017    BUN 12 06/04/2017    CR 0.71 06/05/2017    CR 0.84 06/04/2017    CR 0.85 06/04/2017     (H) 06/05/2017     (H) 06/04/2017     (H) 06/04/2017    TROPONIN <0.07  01/31/2006    TROPI  06/03/2017     <0.015  The 99th percentile for upper reference range is 0.045 ug/L.  Troponin values in   the range of 0.045 - 0.120 ug/L may be associated with risks of adverse   clinical events.      TROPI  06/03/2017     <0.015  The 99th percentile for upper reference range is 0.045 ug/L.  Troponin values in   the range of 0.045 - 0.120 ug/L may be associated with risks of adverse   clinical events.      AST 21 06/04/2017    AST 10 06/03/2017    AST 28 06/25/2009    ALT 25 06/04/2017    ALT 11 06/03/2017    ALT 21 06/25/2009    ALKPHOS 53 06/04/2017    ALKPHOS 42 06/03/2017    ALKPHOS 48 06/25/2009    BILITOTAL 0.3 06/04/2017    BILITOTAL 0.3 06/03/2017    BILITOTAL 0.5 06/25/2009    PARKER 15 06/03/2017    INR 0.98 06/05/2017    INR 1.18 (H) 06/04/2017    INR 1.13 06/03/2017     All cardiac studies reviewed by me.  All imaging studies reviewed by me.  Results for orders placed or performed during the hospital encounter of 06/03/17   XR Chest Port 1 View    Narrative    EXAMINATION: XR CHEST PORT 1 VW  6/3/2017 10:50 PM      CLINICAL HISTORY: Endotracheal tube positioning    COMPARISON: Same date earlier radiograph        FINDINGS:  Interval retraction of endotracheal tube with the tip projecting 3.5  cm above arlene. Placement of OG/NG feeding tube with the tip and  sidehole projecting over the stomach.    Cardiac silhouette within normal limits. Mild left retrocardiac  atelectasis. No pleural effusion or pneumothorax. Changes of  cholecystectomy in the upper abdomen.        Impression    IMPRESSION:  1. Interval retraction of endotracheal tube with the tip projecting  3.5 cm above the arlene.  2. Placement of OG/NG feeding tube with the tip and sidehole  projecting over the stomach.  3. Mild left retrocardiac atelectasis. Otherwise, no acute airspace  disease.    I have personally reviewed the examination and initial interpretation  and I agree with the findings.    PANCHO SOLOMON               Pending Results:   None      It was a pleasure to participate in the care of Jong Galarza.  If you have questions about his care, please do not hesitate to contact the [facility].    [facility]  [facility], [address] - [phone]

## 2017-06-05 NOTE — CONSULTS
Social Work Note:     Request for SW consult received; per H&P, patient is a 67 year old male with history of depression, ETOH abuse, HLD, GERD, who was recently incarcerated for the last several months and released on 5/20/17; found unresponsive around 5PM on 6/3/17 and transferred to [facility] with concerns for acute alcohol overdose with possible venlafaxine combination; intubated and placed on pressors.     Reviewed patient's status during care rounds and reviewed chart; noted patient was extubated; disoriented to situation. Spoke briefly with patient's daughter, Fay via phone ([phone]) this afternoon; however she indicated it wasn't a good time and she requested to talk at a later time; agreed to connect tomorrow as available.     Noted per chart, patient was to d/c this afternoon; however this has since been cancelled.   Psych consulted placed; await their evaluation and recommendation.     PITER [facility], [facility]  ICU   Pager: [phone]  Phone: [phone]

## 2017-06-06 ENCOUNTER — HOSPITAL ENCOUNTER (INPATIENT)
Facility: CLINIC | Age: 67
LOS: 3 days | Discharge: HOME | DRG: 917 | End: 2017-06-09
Attending: PSYCHIATRY & NEUROLOGY | Admitting: PSYCHIATRY & NEUROLOGY
Payer: MEDICARE

## 2017-06-06 VITALS
HEART RATE: 85 BPM | TEMPERATURE: 98.5 F | BODY MASS INDEX: 24.81 KG/M2 | WEIGHT: 167.99 LBS | SYSTOLIC BLOOD PRESSURE: 147 MMHG | OXYGEN SATURATION: 94 % | DIASTOLIC BLOOD PRESSURE: 93 MMHG | RESPIRATION RATE: 18 BRPM

## 2017-06-06 DIAGNOSIS — F10.90 ALCOHOL USE DISORDER: Primary | ICD-10-CM

## 2017-06-06 DIAGNOSIS — K21.9 GASTROESOPHAGEAL REFLUX DISEASE, ESOPHAGITIS PRESENCE NOT SPECIFIED: ICD-10-CM

## 2017-06-06 PROBLEM — R45.851 SUICIDAL IDEATION: Status: ACTIVE | Noted: 2017-06-06

## 2017-06-06 LAB — INTERPRETATION ECG - MUSE: NORMAL

## 2017-06-06 PROCEDURE — A9270 NON-COVERED ITEM OR SERVICE: HCPCS | Mod: GY | Performed by: PHYSICIAN ASSISTANT

## 2017-06-06 PROCEDURE — 99207 ZZC APP CREDIT; MD BILLING SHARED VISIT: CPT | Performed by: PHYSICIAN ASSISTANT

## 2017-06-06 PROCEDURE — 25000132 ZZH RX MED GY IP 250 OP 250 PS 637: Mod: GY | Performed by: PHYSICIAN ASSISTANT

## 2017-06-06 PROCEDURE — A9270 NON-COVERED ITEM OR SERVICE: HCPCS | Mod: GY | Performed by: NURSE PRACTITIONER

## 2017-06-06 PROCEDURE — 25000132 ZZH RX MED GY IP 250 OP 250 PS 637: Mod: GY | Performed by: NURSE PRACTITIONER

## 2017-06-06 PROCEDURE — 93005 ELECTROCARDIOGRAM TRACING: CPT

## 2017-06-06 PROCEDURE — A9270 NON-COVERED ITEM OR SERVICE: HCPCS | Mod: GY | Performed by: STUDENT IN AN ORGANIZED HEALTH CARE EDUCATION/TRAINING PROGRAM

## 2017-06-06 PROCEDURE — 93010 ELECTROCARDIOGRAM REPORT: CPT | Performed by: INTERNAL MEDICINE

## 2017-06-06 PROCEDURE — 25000132 ZZH RX MED GY IP 250 OP 250 PS 637: Mod: GY | Performed by: STUDENT IN AN ORGANIZED HEALTH CARE EDUCATION/TRAINING PROGRAM

## 2017-06-06 PROCEDURE — 99239 HOSP IP/OBS DSCHRG MGMT >30: CPT | Performed by: HOSPITALIST

## 2017-06-06 PROCEDURE — 99222 1ST HOSP IP/OBS MODERATE 55: CPT | Performed by: PSYCHIATRY & NEUROLOGY

## 2017-06-06 RX ORDER — OLANZAPINE 10 MG/2ML
5 INJECTION, POWDER, FOR SOLUTION INTRAMUSCULAR
Status: DISCONTINUED | OUTPATIENT
Start: 2017-06-06 | End: 2017-06-09 | Stop reason: HOSPADM

## 2017-06-06 RX ORDER — HYDROXYZINE HYDROCHLORIDE 25 MG/1
25-50 TABLET, FILM COATED ORAL EVERY 4 HOURS PRN
Status: DISCONTINUED | OUTPATIENT
Start: 2017-06-06 | End: 2017-06-09 | Stop reason: HOSPADM

## 2017-06-06 RX ORDER — NALOXONE HYDROCHLORIDE 0.4 MG/ML
.1-.4 INJECTION, SOLUTION INTRAMUSCULAR; INTRAVENOUS; SUBCUTANEOUS
Status: DISCONTINUED | OUTPATIENT
Start: 2017-06-06 | End: 2017-06-09 | Stop reason: HOSPADM

## 2017-06-06 RX ORDER — TRAZODONE HYDROCHLORIDE 50 MG/1
50 TABLET, FILM COATED ORAL
Status: DISCONTINUED | OUTPATIENT
Start: 2017-06-06 | End: 2017-06-09 | Stop reason: HOSPADM

## 2017-06-06 RX ORDER — BISACODYL 10 MG
10 SUPPOSITORY, RECTAL RECTAL DAILY PRN
Status: DISCONTINUED | OUTPATIENT
Start: 2017-06-06 | End: 2017-06-09 | Stop reason: HOSPADM

## 2017-06-06 RX ORDER — ATORVASTATIN CALCIUM 20 MG/1
20 TABLET, FILM COATED ORAL DAILY
Status: DISCONTINUED | OUTPATIENT
Start: 2017-06-07 | End: 2017-06-07

## 2017-06-06 RX ORDER — LIDOCAINE 50 MG/G
3 PATCH TOPICAL EVERY 24 HOURS
Qty: 30 PATCH | Status: SHIPPED | DISCHARGE
Start: 2017-06-06 | End: 2017-06-06

## 2017-06-06 RX ORDER — ACETAMINOPHEN 500 MG
1000 TABLET ORAL 3 TIMES DAILY PRN
Status: DISCONTINUED | OUTPATIENT
Start: 2017-06-06 | End: 2017-06-09 | Stop reason: HOSPADM

## 2017-06-06 RX ORDER — OXYCODONE HYDROCHLORIDE 5 MG/1
5 TABLET ORAL EVERY 4 HOURS PRN
Status: DISCONTINUED | OUTPATIENT
Start: 2017-06-06 | End: 2017-06-06

## 2017-06-06 RX ORDER — NAPROXEN 500 MG/1
500 TABLET ORAL 2 TIMES DAILY WITH MEALS
DISCHARGE
Start: 2017-06-06 | End: 2017-06-06

## 2017-06-06 RX ORDER — OXYCODONE HYDROCHLORIDE 5 MG/1
5 TABLET ORAL EVERY 6 HOURS PRN
Refills: 0 | Status: ON HOLD | DISCHARGE
Start: 2017-06-06 | End: 2020-09-01

## 2017-06-06 RX ORDER — OXYCODONE HYDROCHLORIDE 5 MG/1
5 TABLET ORAL ONCE
Status: COMPLETED | OUTPATIENT
Start: 2017-06-06 | End: 2017-06-06

## 2017-06-06 RX ORDER — OLANZAPINE 5 MG/1
5-10 TABLET ORAL 3 TIMES DAILY PRN
Status: DISCONTINUED | OUTPATIENT
Start: 2017-06-06 | End: 2017-06-09 | Stop reason: HOSPADM

## 2017-06-06 RX ORDER — ACETAMINOPHEN 500 MG
1000 TABLET ORAL 3 TIMES DAILY PRN
Status: DISCONTINUED | OUTPATIENT
Start: 2017-06-06 | End: 2017-06-06 | Stop reason: HOSPADM

## 2017-06-06 RX ORDER — MULTIVITAMIN,THERAPEUTIC
1 TABLET ORAL DAILY
Status: DISCONTINUED | OUTPATIENT
Start: 2017-06-07 | End: 2017-06-09 | Stop reason: HOSPADM

## 2017-06-06 RX ORDER — PANTOPRAZOLE SODIUM 40 MG/1
40 TABLET, DELAYED RELEASE ORAL
Status: DISCONTINUED | OUTPATIENT
Start: 2017-06-07 | End: 2017-06-07

## 2017-06-06 RX ORDER — NAPROXEN 500 MG/1
500 TABLET ORAL 2 TIMES DAILY WITH MEALS
Status: DISCONTINUED | OUTPATIENT
Start: 2017-06-06 | End: 2017-06-09 | Stop reason: HOSPADM

## 2017-06-06 RX ORDER — RABEPRAZOLE SODIUM 20 MG/1
20 TABLET, DELAYED RELEASE ORAL DAILY
Qty: 30 TABLET | Status: ON HOLD | DISCHARGE
Start: 2017-06-06 | End: 2017-06-09

## 2017-06-06 RX ORDER — NAPROXEN 500 MG/1
500 TABLET ORAL 2 TIMES DAILY WITH MEALS
Status: DISCONTINUED | OUTPATIENT
Start: 2017-06-06 | End: 2017-06-06 | Stop reason: HOSPADM

## 2017-06-06 RX ORDER — OXYCODONE HYDROCHLORIDE 5 MG/1
5 TABLET ORAL EVERY 6 HOURS PRN
Status: DISCONTINUED | OUTPATIENT
Start: 2017-06-06 | End: 2017-06-09 | Stop reason: HOSPADM

## 2017-06-06 RX ORDER — NAPROXEN 500 MG/1
500 TABLET ORAL 2 TIMES DAILY WITH MEALS
Refills: 0 | Status: ON HOLD | DISCHARGE
Start: 2017-06-06 | End: 2020-09-01

## 2017-06-06 RX ORDER — ACETAMINOPHEN 500 MG
1000 TABLET ORAL 3 TIMES DAILY PRN
Status: ON HOLD | DISCHARGE
Start: 2017-06-06 | End: 2020-09-01

## 2017-06-06 RX ORDER — ALUMINA, MAGNESIA, AND SIMETHICONE 2400; 2400; 240 MG/30ML; MG/30ML; MG/30ML
30 SUSPENSION ORAL EVERY 4 HOURS PRN
Status: DISCONTINUED | OUTPATIENT
Start: 2017-06-06 | End: 2017-06-09 | Stop reason: HOSPADM

## 2017-06-06 RX ADMIN — FOLIC ACID 1 MG: 1 TABLET ORAL at 08:45

## 2017-06-06 RX ADMIN — LIDOCAINE 3 PATCH: 50 PATCH TOPICAL at 09:58

## 2017-06-06 RX ADMIN — PANTOPRAZOLE SODIUM 40 MG: 40 TABLET, DELAYED RELEASE ORAL at 08:45

## 2017-06-06 RX ADMIN — OXYCODONE HYDROCHLORIDE 5 MG: 5 TABLET ORAL at 17:04

## 2017-06-06 RX ADMIN — OXYCODONE HYDROCHLORIDE 2.5 MG: 5 TABLET ORAL at 20:13

## 2017-06-06 RX ADMIN — THERA TABS 1 TABLET: TAB at 08:45

## 2017-06-06 RX ADMIN — OXYCODONE HYDROCHLORIDE 5 MG: 5 TABLET ORAL at 23:11

## 2017-06-06 RX ADMIN — ACETAMINOPHEN 1000 MG: 500 TABLET, FILM COATED ORAL at 16:33

## 2017-06-06 RX ADMIN — NAPROXEN 500 MG: 500 TABLET ORAL at 22:23

## 2017-06-06 RX ADMIN — Medication 100 MG: at 08:45

## 2017-06-06 RX ADMIN — OXYCODONE HYDROCHLORIDE 5 MG: 5 TABLET ORAL at 12:05

## 2017-06-06 RX ADMIN — HYDROXYZINE HYDROCHLORIDE 50 MG: 25 TABLET ORAL at 22:23

## 2017-06-06 RX ADMIN — NAPROXEN 500 MG: 500 TABLET ORAL at 19:16

## 2017-06-06 RX ADMIN — ACETAMINOPHEN 1000 MG: 500 TABLET, FILM COATED ORAL at 08:45

## 2017-06-06 RX ADMIN — TRAZODONE HYDROCHLORIDE 50 MG: 50 TABLET ORAL at 22:23

## 2017-06-06 NOTE — PLAN OF CARE
Problem: Goal Outcome Summary  Goal: Goal Outcome Summary  Outcome: No Change  VSS, on RA, A&Ox4. Pt has a 1:1 for suicide precautions, no suicidal ideation noted by writer, pt states he has no plan of killing himself, psych eval done at bedside. One L PIV removed due to bleeding at site, other L PIV saline locked. MSSA score of 3. 3 lidocaine patches applied to anterior chest for midsternal pain from chest compressions done at [facility]. Scheduled tylenol given and PO oxycodone given x 1 for anterior chest pain. Pt states a significant decrease in pain from oxycodone. Pt refused lab draws this morning, otherwise cooperative with nursing cares.

## 2017-06-06 NOTE — IP AVS SNAPSHOT
[address] [address]    Phone:  [phone]                                       After Visit Summary   6/6/2017    Jong Galarza    MRN: [id number]           After Visit Summary Signature Page     I have received my discharge instructions, and my questions have been answered. I have discussed any challenges I see with this plan with the nurse or doctor.    ..........................................................................................................................................  Patient/Patient Representative Signature      ..........................................................................................................................................  Patient Representative Print Name and Relationship to Patient    ..................................................               ................................................  Date                                            Time    ..........................................................................................................................................  Reviewed by Signature/Title    ...................................................              ..............................................  Date                                                            Time

## 2017-06-06 NOTE — PROGRESS NOTES
Pt arrived from  at approximately 1915.  Ambulated to bed with SBA.  All belonging sent with pt.  Oriented to room and call light.

## 2017-06-06 NOTE — PLAN OF CARE
Patient transferred to  bed 34-2 via wheelchair, accompanied by transport tech and 1:1 sitter. Report called to 5B RN. Patient belongings and chart sent to receiving unit. Patient stable at time of transport.

## 2017-06-06 NOTE — PROGRESS NOTES
"Social Work: Assessment with Discharge Plan    Patient Name:  Jong Galarza  :  1950  Age:  67 year old  MRN:  [id number]  Risk/Complexity Score:     Completed assessment with:  Chart review, patient, daughter Fay, Risk Management, ROSE Bean [place] [facility], Dr. Guzman (psych)    Presenting Information   Reason for Referral:  Discharge plan/ Suicide attempt/ ETOH OD/ Currently on 72 hour hold  Date of Intake:  2017  Referral Source:  Physician  Decision Maker:  self  Alternate Decision Maker:  DADNREK  Health Care Directive:  Declined completing  Living Situation:  House  Previous Functional Status:  Independent  Patient and family understanding of hospitalization:  Conversation with dtr Fay: she does not believe this was a \"real suicide attempt\" as she states he owns \"many guns and if he wanted to do it he could have\". She also states he does not remember writing the note and he apologized to her for this. Fay states she is an only child and her mother  when she was 8 years old- since then it has been she and her father. She states she just completed her nicole year of college in [place], and had just returned home for summer break. Pt has many siblings who live locally and are involved and updated on the current situation. Fay states she has never had concern for suicide ideation with her father in the past.  SW also obtained the information re: his recent incarceration, Fay states he was arrested for a DUI charge, however was found to be clean (?) so they were \"pursuing him based on his history\", he spent sometime in long term and is not awaiting trial which is scheduled for \"a few months from now\".     Physical Health  Reason for Admission:    1. Unresponsive    2. Alcohol use disorder (H)    3. Chest wall pain      Services Needed/Recommended:  Other:  IP Psych     Mental Health/Chemical Dependency  Diagnosis:  ETOH abuse, suicide ideation  Support/Services in Place:  " Psych saw pt, recs for IP transfer   Services Needed/Recommended:  IP [place]    Support System  Significant relationship at present time:  Daughter, Fay  Family of origin is available for support:  yes  Other support available:  Siblings, lost his spouse 13 years ago  Gaps in support system:  Lives alone, chronic ETOH abuse  Patient is caregiver to:  None     Provider Information   Primary Care Physician:  No Ref-Primary, Physician   None   Clinic:        :  [facility]    Financial   Income Source:  Pt works as an   Financial Concerns:  Recent incarceration, pending trial for DUI charges  Insurance:    Payor/Plan Subscriber Name Rel Member # Group #   MEDICARE - MEDICARE F* FABIO BERKOWITZ  [id number]       ATTN CLAIMS, [address]   MEDICA - MEDICA PRIME* FABIO BERKOWITZ  [id number]      [address] [address]       Discharge Plan   Patient and family discharge goal:  Patient upset, but willing to transfer  Provided education on discharge plan:  YES  Patient agreeable to discharge plan:  YES  A list of Medicare Certified Facilities was provided to the patient and/or family to encourage patient choice. Patient's choices for facility are:  na  Will NH provide Skilled rehabilitation or complex medical:  YES  General information regarding anticipated insurance coverage and possible out of pocket cost was discussed. Patient and patient's family are aware patient may incur the cost of transportation to the facility, pending insurance payment: YES  Barriers to discharge:  Bed opening    Discharge Recommendations   Anticipated Disposition:  Facility:  [facility]  Transportation Needs:  Medical:  Stretcher  Name of Transportation Company and Phone:  [facility] transport hold for transfer to [address] [place] [address] [phone]    Additional comments   SW spoke with Tamia at [facility], [phone], completed intake for this patient. His 72 hour hold is active 6/5/17 @ 3:25 PM -6/8/2017 @ 3:25 PM. Planning to have  him transfer to an open psych bed before the hold is lifted    AGUEDA spoke with Louisa in Risk Management to make her aware of this patient as he has reportedly been continually stating he has a  involved and is suing the hospital, as well as to discuss the details of his hold and transfer to IP psych timelines. Currently he is on a hold- Louisa reports that if psych did not open a bed before the hold is DC'd the team can initiate a health and wellness hold, means he is on a hold until he has a bed open at psych.     Patient has been unwilling to let the care team contact his family members today, however he now verbalized permission to ROSE Bean at 11:35 am to speak with his daughter. AGUEDA called daughter, she was driving and on her way here, she would like to continue speaking when she gets here. AGUEDA will meet with her.    After much coordination with faiza in IP intake, bed is open for today, MD handoff complete, ok to DC this evening.    Carly FIGUEROA, MSW  5B  (Medical/Surgical)  Phone: [phone]  Pager: [phone]

## 2017-06-06 NOTE — PLAN OF CARE
Problem: Goal Outcome Summary  Goal: Goal Outcome Summary  Outcome: No Change  VSS.  MSSA of 4.  Up ad yvette independently.  Pt given tylenol and oxycodone for sternal pain from chest compressions.  Voiding adequately unmeasured.  1:1 for SI.  Denies SI. Continue to monitor per POC.

## 2017-06-06 NOTE — DISCHARGE SUMMARY
[facility] [place]    Internal Medicine Discharge Summary- Gold Service    Date of Admission:  6/3/2017  Date of Discharge:  6/6/2017  Discharging Provider: Abdulaziz Bean PA-C with Dr. Osvaldo MD  Discharge Team: Gold 1    Discharge Diagnoses   Depression s/p probable SA  Etoh abuse  Acute chest wall pain  Chronic low back and shoulder pain  Leukocytosis, improving  GERD  Hypophosphatemia       Hospital Course   Jong Galarza is a 67 year old male admitted on 6/3/2017. He has a history of alcohol abuse, depression, GERD and hyperlipidemia and was admitted after he was found unresponsive at home.  He briefly received CPR at OSH during a hypotensive episode but never lost pulses.  He was then intubated, transferred to our ICU and is now transferring to the medicine service.    Depression s/p probable SA, etoh abuse:  Found unresponsive by pt's daughter on 6/3 and EMS called and brought initially to [facility], of which pt neeed to intubated for acute respiratory failure. Also needed chest compressions due to HR in the 20-30s. Transferred to [facility] MICU is critical condition, but clinical slowly improved as pt received pressures for shock and ultimately continued to improve after being extubated and off pressors. [place] pt did ingest a heavy amount of etoh as BAL on admission 0.15. There was also concern pt ingested one or more of his psychotropic meds including effexor however, after daughter checked home supply it appeared no pills were missing. Also urine drug screen negative. Nonetheless pt clinically improved to the point of being discharged as he recurrently denied SI after being medically stabilized and has not needed diazepam from [facility] protocol for at least 3 days now; however, was placed on 72 hold yesterday (6/5) after staff learned that daughter found a suicide note. Currently remains on 1:1 bed sitter.  - Transfer to inpatient psychiatric unit (3B) for ongoing evaluation of  his mental status when bed opens up later today. Discussed case with accepting psychiatrist Dr. Robbins prior to transfer   - Continue 1:1 and suicide precautions in interim.     Acute chest wall pain, Chronic low back and shoulder pain:  Due to receiving Johnathan compressions at OSH prior to transfer continues with quite painful sternal pain. CXR neg for fractures. Was controlled on prn 5 mg oxycodone of which he will receive one more dose prior to transfer. Also start scheduled naproxen.  - Continue naproxen 500 mg BID  - Continue with Lidoderm and menthol patches as well as ordered.     Leukocytosis, improving:  Improving (peak 25.5-->11.7) and most likely 2/2 stress reaction as pt has no underlying evidence of infection.   - Recommend repeat CBC tomorrow am.    GERD:  Continue PTA Aciphex    Hypophosphatemia:  Likely due to heavy etoh abuse. Most recent phos 1.9  - Recommend recheck of lytes via BMP as well as phosphorus level tomorrow am       Consultations This Hospital Stay   PHARMACY IP CONSULT  SWALLOW EVAL SPEECH PATH AT BEDSIDE IP CONSULT  SOCIAL WORK IP CONSULT  PHYSICAL THERAPY ADULT IP CONSULT  OCCUPATIONAL THERAPY ADULT IP CONSULT  PSYCHIATRY IP CONSULT     Code Status   Full Code    Time Spent on this Encounter   I, Kam Bean, personally saw the patient today and spent greater than 30 minutes discharging this patient.     Discussed with attending physician MD Abdulaziz Aragon PA-C  Internal Medicine Hospitalist & Staff [place]  [facility]  [phone]   ______________________________________________________________________    Physical Exam   Vital Signs: Temp: 98.5  F (36.9  C) Temp src: Oral BP: (!) 147/93 Pulse: 85 Heart Rate: 90 Resp: 18 SpO2: 94 % O2 Device: None (Room air)    Weight: 167 lbs 15.85 oz  GEN: In NAD  HEENT: NCAT; PERRL; sclerae non-icteric  LUNGS: CTAB  CHEST: Point ttp over sternum without crepitus or bony stepoffs  CV: RRR  ABD: +BSs;  SNTND  EXT: No BLE edema  SKIN: No acute rashes noted on exposed areas.  NEURO: AAOx3; CNs grossly intact; No acute focal deficits noted.      Significant Results and Procedures   CMP  Recent Labs  Lab 06/05/17  1113 06/05/17  0354 06/04/17  2105 06/04/17  1614  06/04/17  0351 06/03/17  2234 06/03/17  1811    143 144 141  < > 147* 147* 149*   POTASSIUM 4.1 3.9 4.2 3.8  < > 3.9 3.2* 2.8*   CHLORIDE  --  110*  --  114*  --  118* 118* 122*   CO2  --  24  --  22  --  12* 17* 14*   ANIONGAP  --  9  --  6  --  16* 13 13   GLC  --  108*  --  111*  --  219* 274* 126*   BUN  --  7  --  10  --  12 14 12   CR  --  0.71  --  0.84  --  0.85 1.12 0.96   GFRESTIMATED  --  >90Non  GFR Calc  --  >90Non  GFR Calc  --  >90Non  GFR Calc 66 78   GFRESTBLACK  --  >90African American GFR Calc  --  >90African American GFR Calc  --  >90African American GFR Calc 79 >90African American GFR Calc   BLANCA  --  8.2*  --  7.6*  --  7.4* 7.0* 5.8*   MAG  --  2.1  --  1.9  --  1.8 2.4* 1.6   PHOS  --  1.9*  --  2.2*  --  1.6* 1.7*  --    PROTTOTAL  --   --   --   --   --  5.2*  --  4.0*   ALBUMIN  --   --   --   --   --  2.6*  --  2.2*   BILITOTAL  --   --   --   --   --  0.3  --  0.3   ALKPHOS  --   --   --   --   --  53  --  42   AST  --   --   --   --   --  21  --  10   ALT  --   --   --   --   --  25  --  11   < > = values in this interval not displayed.       CBC  Recent Labs  Lab 06/05/17  0354 06/04/17  1614 06/04/17  0351 06/03/17  2234   WBC 11.7* 12.4* 25.5* 21.6*   RBC 4.15* 3.90* 4.18* 4.21*   HGB 12.8* 12.2* 13.1* 13.3   HCT 38.8* 35.7* 38.7* 38.9*   MCV 94 92 93 92   MCH 30.8 31.3 31.3 31.6   MCHC 33.0 34.2 33.9 34.2   RDW 13.2 13.2 12.9 12.9    155 217 201     INR  Recent Labs  Lab 06/05/17  0354 06/04/17  0351 06/03/17  1811   INR 0.98 1.18* 1.13     Arterial Blood Gas  Recent Labs  Lab 06/04/17  1614 06/04/17  1253 06/04/17  0351 06/04/17  0102   PH 7.33* 7.47* 7.20* 7.17*    PCO2 34* 23* 30* 33*   PO2 91 138* 92 113*   HCO3 18* 16* 12* 12*   O2PER 30.0 45.0 50 60.0        Pending Results     Unresulted Labs Ordered in the Past 30 Days of this Admission     Date and Time Order Name Status Description    6/3/2017 1809 Blood culture Preliminary     6/3/2017 1809 Blood culture Preliminary              Results for orders placed or performed during the hospital encounter of 06/03/17   XR Chest Port 1 View    Narrative    EXAMINATION: XR CHEST PORT 1 VW  6/3/2017 10:50 PM      CLINICAL HISTORY: Endotracheal tube positioning    COMPARISON: Same date earlier radiograph        FINDINGS:  Interval retraction of endotracheal tube with the tip projecting 3.5  cm above arlene. Placement of OG/NG feeding tube with the tip and  sidehole projecting over the stomach.    Cardiac silhouette within normal limits. Mild left retrocardiac  atelectasis. No pleural effusion or pneumothorax. Changes of  cholecystectomy in the upper abdomen.        Impression    IMPRESSION:  1. Interval retraction of endotracheal tube with the tip projecting  3.5 cm above the arlene.  2. Placement of OG/NG feeding tube with the tip and sidehole  projecting over the stomach.  3. Mild left retrocardiac atelectasis. Otherwise, no acute airspace  disease.    I have personally reviewed the examination and initial interpretation  and I agree with the findings.    PANCHO SOLOMON       Primary Care Physician   Physician No Ref-Primary    Discharge Disposition   Discharged to inpatient psychiatry unit (st 3B) in [facility]  Condition at discharge: Stable    Discharge Orders     Reason for your hospital stay   Found unresponsive, severe metabolic and respiratory acidosis secondary to alcohol use, received chest compressions and was intubated.     Activity   Your activity upon discharge: activity as tolerated     When to contact your care team   Call your primary doctor if you have any of the following: temperature greater than 100.5,   increased shortness of breath, unable to swallow liquids or solids, new chest pain different from current chest wall pain, feel like you are going to pass out, are tempted to use alcohol again.     Full Code     Diet   Follow this diet upon discharge:      Advance Diet as Tolerated: Regular Diet Adult       Discharge Medications   Current Discharge Medication List      START taking these medications    Details   acetaminophen (TYLENOL) 500 MG tablet Take 2 tablets (1,000 mg) by mouth 3 times daily as needed for mild pain or fever    Associated Diagnoses: Chest wall pain      naproxen (NAPROSYN) 500 MG tablet Take 1 tablet (500 mg) by mouth 2 times daily (with meals)  Refills: 0    Associated Diagnoses: Chest wall pain      multivitamin, therapeutic (THERA-VIT) TABS tablet Take 1 tablet by mouth daily  Qty: 30 tablet, Refills: 0    Associated Diagnoses: Alcohol use disorder (H)      menthol (ICY HOT) 5 % PTCH Apply 1 patch topically every 8 hours as needed for muscle soreness  Qty: 14 patch, Refills: 0    Associated Diagnoses: Chest wall pain         CONTINUE these medications which have CHANGED    Details   RABEprazole (ACIPHEX) 20 MG EC tablet Take 1 tablet (20 mg) by mouth daily  Qty: 30 tablet    Associated Diagnoses: Gastroesophageal reflux disease, esophagitis presence not specified         CONTINUE these medications which have NOT CHANGED    Details   atorvastatin (LIPITOR) 20 MG tablet Take 20 mg by mouth daily.      sildenafil (VIAGRA) 100 MG tablet Take 100 mg by mouth daily as needed.         STOP taking these medications       IBUPROFEN PO Comments:   Reason for Stopping:         lorazepam (ATIVAN) 1 MG tablet Comments:   Reason for Stopping:         TRAZodone (DESYREL) 100 MG tablet Comments:   Reason for Stopping:         venlafaxine (EFFEXOR) 75 MG tablet Comments:   Reason for Stopping:             Allergies   Allergies   Allergen Reactions     Sulfa Drugs

## 2017-06-06 NOTE — PLAN OF CARE
Problem: Goal Outcome Summary  Goal: Goal Outcome Summary  Outcome: No Change  VSS.  MSSA score of 6.  A/Ox4.  Up with SBA.  C/o sternal pain from chest compressions at [facility]; scheduled tylenol given.  Poor appetite.  Slept most of the evening.  Voiding adequately.  Continue to monitor per POC.

## 2017-06-06 NOTE — CONSULTS
DATE OF SERVICE:  06/06/2017      IDENTIFICATION:  Mr. Jong Snow is a 67-year-old white male who is currently hospitalized after an overdose on alcohol.  He has a long history of alcohol use and I am asked to evaluate his suicidal ideation by Johny Kiran NP.      HISTORY OF PRESENT ILLNESS:  I had an opportunity to review the electronic medical record prior to interviewing this patient and note that he has a long history of alcohol use, treated by Dr. Galdamez up until 2011.  He is currently a patient of Dr. Ortega.  According to our records, his prior to admission medications included lorazepam 1 mg by mouth daily, trazodone 200 mg and venlafaxine 75 b.i.d.  The patient also mentioned quetiapine, that is not in our current list.      Mr. Snow which was apparently found by his daughter unresponsive on his chair.  EMS was called to the scene.  The first blood pressure upon arrival was 64/40, heart rate in the 50s with a blood sugar of 188.  He was having significant trouble breathing and positive pressure ventilation was initiated.  According to our records, EMS did not initiate compressions; however, chest compressions were started at [facility] with a Johnathan machine and he received 3 doses of epinephrine and was placed on a leave with a drip.  He was then intubated.      From what I understand, the patient was recently in assisted.  He tells me that his  reports he needs to go back to assisted.  He also apparently is missing a psychiatry appointment with Dr. Ortega.  I asked for a release of information to talk to Dr. Ortega and the patient said he will not cooperate with anything in this facility because he reports we almost killed him 10 years ago.  He does not give much credence to the idea that [facility] just saved his life.      The patient's daughter contacted the treatment team, stating that he had found a suicide note and it appears that the patient preferred killing himself to  "going back to [facility].  When I asked him about that interpretation, he said \"that would be reasonable interpretation; however, I no longer wish to kill myself\" and he has been consistent in denying suicidal ideation.  This does put us in a difficult position where the patient made a very significant suicide attempt, ended up needing intubation, chest compressions and pressor support, very close to death and now denying suicidal ideation and asking to be discharged.  I do think discharge is reasonable if we can come up with a safe discharge plan and that will be the intention.  I have talked to social work, we will obviously need to speak to the daughter and I asked the patient to talk directly to his  so that his  can try to determine what would be the best option for the patient.  If in fact he is going to go directly to [facility], that may be a discharge plan.  Alternatively [facility] may say that he made a significant suicide attempt and not accept him.  All of these issues have yet to be clarified and I have had a long talk with  about these issues.      PAST MEDICAL HISTORY:  Depression, alcohol abuse, erectile dysfunction, GERD and HLB.  I note that on 10/06 he received robotic-assisted prostatectomy and open abdominal exploration for postoperative bleeding.      ALLERGIES:  Sulfa drugs.      FAMILY HISTORY:  The patient tells me that he has had 2 uncles who successfully completed suicide.  He reports to me that he has 2 parents and uncles and aunts who were alcoholics.      SOCIAL HISTORY:  The patient worked as an .  He has had recent incarceration and has a long history of alcohol use.  He is apparently living with his daughter.  I did attempt to contact daughter, but was unable to reach her.      REVIEW OF SYSTEMS:  On my interview, the patient reports that he has no difficulty with seeing particularly when he wears his classes.  He reports he is hard of hearing.  He reports " "significant chest pain and occasional shortness of breath from pain on his chest from compressions.  He is getting lidocaine patches from further chest pain.  He denies abdominal discomfort but says he is having some diarrhea.  He denies genitourinary symptoms or problems with muscles, skin or joints and he denied any endocrinopathies.      MENTAL STATUS EXAMINATION:  On my interview, the patient was generally pleasant and cooperative, though he reports he refuses to cooperate with releases of information, etc. because \"this institution almost killed him 10 years ago.\"  His mood appears dysphoric.  His affect is restricted.  His speech is coherent and goal oriented.  His associations are tight.  His thought processes are generally logical and linear, but he still does not have insight into the seriousness of his suicide attempt.  Content of thought is currently without psychosis, and he currently denies suicidal ideation.  Recent and remote memory, concentration, fund of knowledge and use of language appear to be coming back to baseline.  He was noted to be somewhat disoriented yesterday.  Today, he was able to spell the word \"world\" forwards and backwards rapidly and accurately.  He was oriented x 3, knew the presidents back to [place] and seemed to be intact.  Insight and judgment are both quite guarded.  Muscle strength and tone appear to be at baseline.  Recent vital signs include a temperature of 98.3, heart rate of 94, respiration rate of 18, blood pressure of 142/90, with 95% oxygen saturation.      ASSESSMENT:  Alcohol use disorder.  History of major depressive disorder.  The patient is at risk for alcohol withdrawal.      RECOMMENDATIONS:  I discussed the case with  in attempt to come up with a safe discharge plan.  For now the patient is on a 72-hour hold and we will need more information to come up with a safe disposition and as mentioned, the patient continues to be at risk for withdrawal.  " His extent of alcohol use recently is unclear.  Daughter was unaware that he was even drinking.         DEANDRE NAGY MD             D: 2017 10:50   T: 2017 11:21   MT: PETROS      Name:     FABIO BERKOWITZ   MRN:      [id number]        Account:       [id number]   :      1950           Consult Date:  2017      Document: [id number]       cc: Johny Kiran NP

## 2017-06-06 NOTE — IP AVS SNAPSHOT
MRN:[id number]                      After Visit Summary   6/6/2017    Jong Galarza    MRN: [id number]           Thank you!     Thank you for choosing [facility] for your care. Our goal is always to provide you with excellent care.        Patient Information     Date Of Birth          1950        Designated Caregiver       Most Recent Value    Caregiver    Will someone help with your care after discharge? yes    Name of designated caregiver Kiah    Phone number of caregiver [phone]    Caregiver address [address] [facility] [address]      About your hospital stay     You were admitted on:  June 6, 2017 You last received care in the:  [facility]    You were discharged on:  June 9, 2017       Who to Call     For medical emergencies, please call 911.  For non-urgent questions about your medical care, please call your primary care provider or clinic, None          Attending Provider     Provider Specialty    Power Ledbetter MD Psychiatry       Primary Care Provider    Physician No Ref-Primary      Further instructions from your care team       Behavioral Discharge Planning and Instructions    Summary: You were admitted to psychiatry service from the medical unit after presented and treated for overdose of alcohol in suicide attempt.   During your hospitalization, your mood and symptoms were stabilized. Programming was offered daily and the psychiatrist met with you.  Medications were adjusted.  You are being discharged home today to resume care with your outpatient psychiatrist and follow up with your  and court system.      Main Diagnosis: Major Depressive Disorder; Alcohol Abuse Disorder    Major Treatments, Procedures and Findings: Take all medications as prescribed.    Symptoms to Report: increased confusion or thoughts of suicide    Psychiatry Follow-up:   Dr. Pastor Ortega - Next Appt on Monday June12th at 12:20pm  [facility] in [place]  [phone]  FAX:  "[phone]    Court Hearing at 9am on   [facility] [address]    Release of Information/Official Copies of Medical Records call [phone]    Resources:   Crisis Intervention: [phone] or [phone] (TTY: [phone]).  Call anytime for help.  Alcoholics Anonymous (www.alcoholics-anonymous.org): Check your phone book for your local chapter.    General Medication Instructions:   See your medication sheet(s) for instructions.   Take all medicines as directed.  Make no changes unless your doctor suggests them.   Go to all your doctor visits.  Be sure to have all your required lab tests. This way, your medicines can be refilled on time.  Do not use any drugs not prescribed by your doctor.  Avoid alcohol.    The treatment team has appreciated the opportunity to work with you.  We wish you the best in the future. If you have any questions or concerns our unit number is [phone].      Pending Results     No orders found from 2017 to 2017.            Admission Information     Date & Time Department Dept. Phone    2017 [facility] [phone]      Your Vitals Were     Blood Pressure Pulse Temperature Respirations Height Weight    157/96 74 98.5  F (36.9  C) (Oral) 16 1.702 m (5' 7\") 69.8 kg (153 lb 12.8 oz)    BMI (Body Mass Index)                   24.09 kg/m2           CoreOS Information     [employer] lets you send messages to your doctor, view your test results, renew your prescriptions, schedule appointments and more. To sign up, go to [email or web address] . Click on \"Log in\" on the left side of the screen, which will take you to the Welcome page. Then click on \"Sign up Now\" on the right side of the page.     You will be asked to enter the access code listed below, as well as some personal information. Please follow the directions to create your username and password.     Your access code is: [email or web address]  Expires: 2017  7:31 PM     Your access code will  " in 90 days. If you need help or a new code, please call your [facility] clinic or [phone].        Care EveryWhere ID     This is your Care EveryWhere ID. This could be used by other organizations to access your [facility] medical records  [id number]           Review of your medicines      START taking        Dose / Directions    lidocaine 5 % Patch   Commonly known as:  LIDODERM   Used for:  Alcohol use disorder (H)        Dose:  1 patch   Place 1 patch onto the skin every 24 hours   Quantity:  30 patch   Refills:  3         CONTINUE these medicines which have NOT CHANGED        Dose / Directions    acetaminophen 500 MG tablet   Commonly known as:  TYLENOL   Used for:  Chest wall pain        Dose:  1000 mg   Take 2 tablets (1,000 mg) by mouth 3 times daily as needed for mild pain or fever   Refills:  0       ATIVAN PO        Dose:  1 mg   Take 1 mg by mouth 2 times daily as needed for anxiety   Refills:  0       menthol 5 % Ptch   Commonly known as:  ICY HOT   Used for:  Chest wall pain        Dose:  1 patch   Apply 1 patch topically every 8 hours as needed for muscle soreness   Quantity:  14 patch   Refills:  0       METOPROLOL SUCCINATE ER PO   Indication:  High Blood Pressure        Dose:  100 mg   Take 100 mg by mouth daily   Refills:  0       multivitamin, therapeutic Tabs tablet   Used for:  Alcohol use disorder (H)        Dose:  1 tablet   Take 1 tablet by mouth daily   Quantity:  30 tablet   Refills:  0       naproxen 500 MG tablet   Commonly known as:  NAPROSYN   Used for:  Chest wall pain        Dose:  500 mg   Take 1 tablet (500 mg) by mouth 2 times daily (with meals)   Refills:  0       oxyCODONE 5 MG IR tablet   Commonly known as:  ROXICODONE   Used for:  Chest wall pain        Dose:  5 mg   Take 1 tablet (5 mg) by mouth every 6 hours as needed for moderate to severe pain   Refills:  0       QUETIAPINE FUMARATE PO   Indication:  MDD        Dose:  25 mg   Take 25 mg by mouth 3 times daily as needed    Refills:  0       RABEprazole 20 MG EC tablet   Commonly known as:  ACIPHEX   Used for:  Gastroesophageal reflux disease, esophagitis presence not specified        Dose:  20 mg   Take 1 tablet (20 mg) by mouth daily   Quantity:  30 tablet   Refills:  3       SIMVASTATIN PO   Indication:  Type II A Hyperlipidemia        Dose:  40 mg   Take 40 mg by mouth At Bedtime   Refills:  0       TRAZODONE HCL PO   Indication:  Trouble Sleeping        Dose:  100-300 mg   Take 100-300 mg by mouth nightly as needed for sleep   Refills:  0       venlafaxine 150 MG 24 hr capsule   Commonly known as:  EFFEXOR-XR   Indication:  Major Depressive Disorder        Dose:  300 mg   Take 300 mg by mouth daily   Refills:  0         STOP taking     VIAGRA 100 MG cap/tab   Generic drug:  sildenafil                Where to get your medicines      These medications were sent to [facility] [address] [place] [address]  [address] [address]     Phone:  [phone]     lidocaine 5 % Patch    RABEprazole 20 MG EC tablet                Protect others around you: Learn how to safely use, store and throw away your medicines at www.disposemymeds.org.             Medication List: This is a list of all your medications and when to take them. Check marks below indicate your daily home schedule. Keep this list as a reference.      Medications           Morning Afternoon Evening Bedtime As Needed    acetaminophen 500 MG tablet   Commonly known as:  TYLENOL   Take 2 tablets (1,000 mg) by mouth 3 times daily as needed for mild pain or fever   Last time this was given:  1,000 mg on 6/8/2017 10:45 AM                                ATIVAN PO   Take 1 mg by mouth 2 times daily as needed for anxiety                                lidocaine 5 % Patch   Commonly known as:  LIDODERM   Place 1 patch onto the skin every 24 hours   Last time this was given:  1 patch on 6/9/2017 12:03 PM                                menthol 5  % Ptch   Commonly known as:  ICY HOT   Apply 1 patch topically every 8 hours as needed for muscle soreness                                METOPROLOL SUCCINATE ER PO   Take 100 mg by mouth daily   Last time this was given:  100 mg on 6/9/2017  9:23 AM                                multivitamin, therapeutic Tabs tablet   Take 1 tablet by mouth daily   Last time this was given:  1 tablet on 6/9/2017  9:23 AM                                naproxen 500 MG tablet   Commonly known as:  NAPROSYN   Take 1 tablet (500 mg) by mouth 2 times daily (with meals)   Last time this was given:  500 mg on 6/9/2017  9:23 AM                                oxyCODONE 5 MG IR tablet   Commonly known as:  ROXICODONE   Take 1 tablet (5 mg) by mouth every 6 hours as needed for moderate to severe pain   Last time this was given:  5 mg on 6/9/2017 10:45 AM                                QUETIAPINE FUMARATE PO   Take 25 mg by mouth 3 times daily as needed                                RABEprazole 20 MG EC tablet   Commonly known as:  ACIPHEX   Take 1 tablet (20 mg) by mouth daily                                SIMVASTATIN PO   Take 40 mg by mouth At Bedtime   Last time this was given:  40 mg on 6/8/2017  9:43 PM                                TRAZODONE HCL PO   Take 100-300 mg by mouth nightly as needed for sleep   Last time this was given:  50 mg on 6/8/2017 10:28 PM                                venlafaxine 150 MG 24 hr capsule   Commonly known as:  EFFEXOR-XR   Take 300 mg by mouth daily

## 2017-06-06 NOTE — PLAN OF CARE
Problem: Goal Outcome Summary  Goal: Goal Outcome Summary  Outcome: No Change  During the [facility] shift the had a bedside attendant present for the entire shift for suicidal ideations. Currently the patient denies being suicidal or having a plan in place. The patient reports having mid sternal pain from the CPR he received at another facility. Continue to monitor the patient and follow the POC.

## 2017-06-07 ENCOUNTER — CARE COORDINATION (OUTPATIENT)
Dept: CARE COORDINATION | Facility: CLINIC | Age: 67
End: 2017-06-07

## 2017-06-07 LAB — INTERPRETATION ECG - MUSE: NORMAL

## 2017-06-07 PROCEDURE — 99221 1ST HOSP IP/OBS SF/LOW 40: CPT | Performed by: NURSE PRACTITIONER

## 2017-06-07 PROCEDURE — 99207 ZZC CONSULT E&M CHANGED TO INITIAL LEVEL: CPT | Performed by: NURSE PRACTITIONER

## 2017-06-07 RX ORDER — SIMVASTATIN 10 MG
40 TABLET ORAL AT BEDTIME
Status: DISCONTINUED | OUTPATIENT
Start: 2017-06-07 | End: 2017-06-09 | Stop reason: HOSPADM

## 2017-06-07 RX ORDER — METOPROLOL SUCCINATE 100 MG/1
100 TABLET, EXTENDED RELEASE ORAL DAILY
Status: DISCONTINUED | OUTPATIENT
Start: 2017-06-07 | End: 2017-06-09 | Stop reason: HOSPADM

## 2017-06-07 RX ORDER — TRAZODONE HYDROCHLORIDE 100 MG/1
100 TABLET ORAL AT BEDTIME
Status: DISCONTINUED | OUTPATIENT
Start: 2017-06-07 | End: 2017-06-09 | Stop reason: HOSPADM

## 2017-06-07 RX ORDER — VENLAFAXINE 25 MG/1
25 TABLET ORAL 2 TIMES DAILY WITH MEALS
Status: DISCONTINUED | OUTPATIENT
Start: 2017-06-07 | End: 2017-06-09 | Stop reason: HOSPADM

## 2017-06-07 RX ORDER — VENLAFAXINE HYDROCHLORIDE 150 MG/1
300 CAPSULE, EXTENDED RELEASE ORAL DAILY
Status: ON HOLD | COMMUNITY
End: 2020-09-01

## 2017-06-07 RX ORDER — LIDOCAINE 50 MG/G
1 PATCH TOPICAL
Status: DISCONTINUED | OUTPATIENT
Start: 2017-06-07 | End: 2017-06-09 | Stop reason: HOSPADM

## 2017-06-07 RX ADMIN — NAPROXEN 500 MG: 500 TABLET ORAL at 17:33

## 2017-06-07 RX ADMIN — ACETAMINOPHEN 1000 MG: 500 TABLET ORAL at 03:08

## 2017-06-07 RX ADMIN — OXYCODONE HYDROCHLORIDE 5 MG: 5 TABLET ORAL at 05:54

## 2017-06-07 RX ADMIN — PANTOPRAZOLE SODIUM 40 MG: 40 TABLET, DELAYED RELEASE ORAL at 09:40

## 2017-06-07 RX ADMIN — HYDROXYZINE HYDROCHLORIDE 50 MG: 25 TABLET ORAL at 09:39

## 2017-06-07 RX ADMIN — LIDOCAINE 1 PATCH: 50 PATCH CUTANEOUS at 20:51

## 2017-06-07 RX ADMIN — NAPROXEN 500 MG: 500 TABLET ORAL at 09:39

## 2017-06-07 RX ADMIN — ACETAMINOPHEN 1000 MG: 500 TABLET ORAL at 19:46

## 2017-06-07 RX ADMIN — ACETAMINOPHEN 1000 MG: 500 TABLET ORAL at 15:08

## 2017-06-07 RX ADMIN — OXYCODONE HYDROCHLORIDE 5 MG: 5 TABLET ORAL at 22:13

## 2017-06-07 RX ADMIN — THERA TABS 1 TABLET: TAB at 09:40

## 2017-06-07 RX ADMIN — OXYCODONE HYDROCHLORIDE 5 MG: 5 TABLET ORAL at 12:24

## 2017-06-07 RX ADMIN — SIMVASTATIN 40 MG: 10 TABLET, FILM COATED ORAL at 22:13

## 2017-06-07 RX ADMIN — VENLAFAXINE 25 MG: 25 TABLET ORAL at 20:31

## 2017-06-07 RX ADMIN — TRAZODONE HYDROCHLORIDE 100 MG: 100 TABLET ORAL at 22:13

## 2017-06-07 RX ADMIN — METOPROLOL SUCCINATE 100 MG: 100 TABLET, FILM COATED, EXTENDED RELEASE ORAL at 12:25

## 2017-06-07 ASSESSMENT — ACTIVITIES OF DAILY LIVING (ADL)
DRESS: SCRUBS (BEHAVIORAL HEALTH)
HYGIENE/GROOMING: INDEPENDENT
ORAL_HYGIENE: INDEPENDENT
GROOMING: INDEPENDENT
DRESS: SCRUBS (BEHAVIORAL HEALTH)
ORAL_HYGIENE: INDEPENDENT

## 2017-06-07 NOTE — CONSULTS
"  Internal Medicine Initial Visit    Jong Galarza MRN# [id number]   Age: 67 year old YOB: 1950   Date of Admission: 6/6/2017     Reason for consult: Chest Pain after CPR       Requesting physician Dr. Ledbetter       SUBJECTIVE   HPI:   Jong Galarza is a 67 year old male with a medical history of hyperlipidemia, GERD, HTN, anxiety, alcohol abuse and MDD who was admitted to [facility] 6/3 after being found unresponsive at home.  He briefly received CPR at OSH during a hypotensive event but never lost a pulse.  Intubated and transferred to ICU where he was managed 6/3-6/5.  Extubated, transferred to internal medicine on 6/5 for further care.  Transferred to station 10 6/6 for psychiatric stabilization as daughter found suicide note at home.      Currently, patient is complaining of lack of sleep 2/2 his roommate snoring and ongoing chest pain from the chest compression performed at the OSH.  He was transferred to inpatient psychiatry overnight.  He states our EMR is \"completely outdated\" in that we have all incorrect home medications in his chart.  He states he is ornery this AM 2/2 lack of sleep.  Denies fevers, chills, headaches, dizziness, new skin rashes or lesions, rhinorrhea, sore throat, cough, shortness of breath, nausea, vomiting, abdominal pain, constipation, diarrhea, urinary symptoms or peripheral edema.     Past Medical History:     Past Medical History:   Diagnosis Date     Anxiety      GERD (gastroesophageal reflux disease)      Hyperlipidemia      Hypertension      Major depression       Reviewed & updated in [facility].     Past Surgical History:      Past Surgical History:   Procedure Laterality Date     C REPAIR CRUCIATE LIGAMENT,KNEE Left 1985     LAPAROSCOPIC HERNIORRHAPHY INGUINAL BILATERAL       PROSTATECTOMY PERINEAL  2006      Reviewed & updated in Epic.     Medications prior to admission:     Prior to Admission Medications   Prescriptions Last Dose Informant Patient " "Reported? Taking?   IBUPROFEN PO   Yes Yes   Sig: Take 400 mg by mouth every 6 hours as needed for moderate pain   IBUPROFEN PO 6/5/2017 at 1747  Yes Yes   Sig: Take 400 mg by mouth every 6 hours as needed for moderate pain   Pantoprazole Sodium (PROTONIX PO) 6/6/2017 at 0845  Yes Yes   Sig: Take 40 mg by mouth every morning (before breakfast)   RABEprazole (ACIPHEX) 20 MG EC tablet   No No   Sig: Take 1 tablet (20 mg) by mouth daily   acetaminophen (TYLENOL) 500 MG tablet 6/6/2017 at 1633  No Yes   Sig: Take 2 tablets (1,000 mg) by mouth 3 times daily as needed for mild pain or fever   atorvastatin (LIPITOR) 20 MG tablet Past Week at 0800  Yes Yes   Sig: Take 20 mg by mouth daily.   menthol (ICY HOT) 5 % PTCH 6/6/2017 at 1004  No Yes   Sig: Apply 1 patch topically every 8 hours as needed for muscle soreness   multivitamin, therapeutic (THERA-VIT) TABS tablet 6/6/2017 at 0845  No Yes   Sig: Take 1 tablet by mouth daily   naproxen (NAPROSYN) 500 MG tablet 6/6/2017 at 1916  No Yes   Sig: Take 1 tablet (500 mg) by mouth 2 times daily (with meals)   oxyCODONE (ROXICODONE) 5 MG IR tablet 6/6/2017 at 1705  No Yes   Sig: Take 1 tablet (5 mg) by mouth every 6 hours as needed for moderate to severe pain   sildenafil (VIAGRA) 100 MG tablet   Yes No   Sig: Take 100 mg by mouth daily as needed.      Facility-Administered Medications: None         Allergies:     Allergies   Allergen Reactions     Sulfa Drugs          Social History:   Tobacco Use: Denies  Alcohol Use: Daily  Illicit Drug Use: Denies     Family History:   No family history on file.     Reviewed & updated in Epic.     Review of Systems:   Ten point review of systems negative except as stated above in History of Present Illness.    OBJECTIVE   Physical Exam:   Blood pressure (!) 157/96, pulse 99, temperature 98.2  F (36.8  C), temperature source Oral, height 1.702 m (5' 7\"), weight 70.2 kg (154 lb 11.2 oz).  General: Alert, awake,   HEENT: NC/AT. Anicteric sclera. " Eyes symmetric and free of discharge bilaterally. Mucous membranes moist.  Neck: Refused exam  Cardiovascular: Refused exam  Lungs: Refused exam  Abdomen: Refused exam   Extremities: Refused exam   Skin: Refused exam  Neurologic: A&O X 3. Answers questions appropriately, appears agitated at times.      Laboratory Data:   [facility]  Recent Labs  Lab 06/05/17  1113 06/05/17  0354 06/04/17  2105 06/04/17  1614  06/04/17  0351 06/03/17  2234 06/03/17  1811    143 144 141  < > 147* 147* 149*   POTASSIUM 4.1 3.9 4.2 3.8  < > 3.9 3.2* 2.8*   CHLORIDE  --  110*  --  114*  --  118* 118* 122*   CO2  --  24  --  22  --  12* 17* 14*   ANIONGAP  --  9  --  6  --  16* 13 13   GLC  --  108*  --  111*  --  219* 274* 126*   BUN  --  7  --  10  --  12 14 12   CR  --  0.71  --  0.84  --  0.85 1.12 0.96   GFRESTIMATED  --  >90Non  GFR Calc  --  >90Non  GFR Calc  --  >90Non  GFR Calc 66 78   GFRESTBLACK  --  >90African American GFR Calc  --  >90African American GFR Calc  --  >90African American GFR Calc 79 >90African American GFR Calc   BLANCA  --  8.2*  --  7.6*  --  7.4* 7.0* 5.8*   MAG  --  2.1  --  1.9  --  1.8 2.4* 1.6   PHOS  --  1.9*  --  2.2*  --  1.6* 1.7*  --    PROTTOTAL  --   --   --   --   --  5.2*  --  4.0*   ALBUMIN  --   --   --   --   --  2.6*  --  2.2*   BILITOTAL  --   --   --   --   --  0.3  --  0.3   ALKPHOS  --   --   --   --   --  53  --  42   AST  --   --   --   --   --  21  --  10   ALT  --   --   --   --   --  25  --  11   < > = values in this interval not displayed.    CBC  Recent Labs  Lab 06/05/17  0354 06/04/17  1614 06/04/17  0351 06/03/17  2234   WBC 11.7* 12.4* 25.5* 21.6*   RBC 4.15* 3.90* 4.18* 4.21*   HGB 12.8* 12.2* 13.1* 13.3   HCT 38.8* 35.7* 38.7* 38.9*   MCV 94 92 93 92   MCH 30.8 31.3 31.3 31.6   MCHC 33.0 34.2 33.9 34.2   RDW 13.2 13.2 12.9 12.9    155 217 201       TSHNo lab results found in last 7 days.       Assessment and  Plan/Recommendations:   Jong Galarza is a 67 year old male with a medical history of hyperlipidemia, GERD, HTN, anxiety, alcohol abuse and MDD who was admitted to [facility] 6/3 after being found unresponsive at home.  He briefly received CPR at OSH during a hypotensive event but never lost a pulse.  Intubated and transferred to ICU where he was managed 6/3-6/5.  Extubated, transferred to internal medicine on 6/5 for further care.  Transferred to station 10 6/6 for psychiatric stabilization as daughter found suicide note at home.  Internal Medicine consulted for management of chest pain 2/2 chest compressions.     Acute Chest Wall Pain; Chronic Low Back and Shoulder Pain - 2/2 receiving Johnathan compressions at OSH prior to transfer to [facility].  Continues to have painful sternal pain.  CXR negative for fracture.  Currently managed on oral Oxycodone 5 mg Po q 6 hours, Menthol patches, Tylenol and Naproxen.  Pt states pain improving slowly.   - Continue Oxycodone PRN, Menthol patches and tylenol/naproxen  - Would not recommend discharging patient on narcotics when that time comes given concern for SI/SA this admission  - Can consider tramadol instead if continues to have pain     MDD s/p probably SA; Anxiety; Etoh abuse - Pt admitted to [facility] ICU after being found down at home.  Intubated for airway protection.  Transferred to medical floor on 6/5.  Per chart review, daughter found suicide note at patient's home.  Concern for etoh intoxication as well as possible Effexor O/D.  Daughter was able to check home supply, however, and notes no pills were missing.  Managed on MSSA protocol while in hospital.  Transferred to inpatient psych on 6/6 for psychiatric stabilization.  Call placed to home pharmacy ([facility] @ [address], [place] [phone]) in order to collect accurate home meds which include: Lorazepam 1 mg PO BID PRN, Venlafaxine 300 mg PO QD, Seroquel 25 mg PO TID PRN and Trazodone 100-300 mg PO QHS PRN  insomnia.   - Care per primary psychiatric team   - Cont to monitor     Benign Essential Hypertension - Managed on PTA Metoprolol Succinate  mg PO daily (confimred today with PTA pharmacy--see above).  BP running high on transfer with SBP ~150-170.   - Restart PTA Metoprolol Succinate at home dose    History of HTN - Managed on PTA Simvastatin 40 mg PO daily.  He was prescribed Atorvastatin while in hospital.   - Change to PTA Simvastatin starting tonight     Currently, this patient is medically stable and medicine will sign off. Please page the Internal Medicine job code pager for any intercurrent medical issues which arise. Thank you for the opportunity to be a part of this patient's care.    Melinda Novak, [facility]  Hospitalist Service  [phone]

## 2017-06-07 NOTE — PLAN OF CARE
Problem: General Plan of Care (Inpatient Behavioral)  Goal: Team Discussion  Team Plan:   BEHAVIORAL TEAM DISCUSSION     Participants: Freda Banerjee RN; Fartun ALBA; Power Ledbetter MD     Progress: Patient had reported to Dr. Guzman on Medical Unit that he was no longer suicidal and overdosed because he didn't want to return to prison which is apparently in his future after the next hearing in several months.       Continued Stay Criteria/Rationale: New admit     Medical/Physical: See Consult  Precautions:   Behavioral Orders   Procedures     Code 1 - Restrict to Unit     Routine Programming       As clinically indicated     Status 15       Every 15 minutes.     Suicide precautions     Plan: Patient has another criminal hearing in several months and knows that he must return to prison.  He sees Addiction Psychiatrist Dr. Pastor Ortega for outpatient care.  He lives with his daughter.     Rationale for change in precautions or plan: No change.

## 2017-06-07 NOTE — PROGRESS NOTES
Medicine Cross Cover Note    Contacted by nursing regarding chest pain. EMS here to transport patient to psychiatry. Patient reports that this is the same pain that he has been experiencing since receiving Johnathan chest compressions. It is worse currently as it has been a few hours since his last dose of pain medication. Also worse when taking a deep breath or moving. Tylenol, ibuprofen, oxycodone, patches all helpful. EKG w/o acute ST changes. No associated diaphoresis, shortness of breath, N/V, radiation of pain. Pain is entirely reproducible on exam. Low suspicion for cardiac etiology at this time - pain is musculoskeletal in nature d/t recent compressions. Ok for additional dose of oxycodone. Can continue this over on psychiatry along w/ scheduled naproxen, PRN Tylenol, Icy Hot or lidocaine patches. Psychiatry should consult hospitalist in AM to check in on pain control. Is cleared for d/c to psychiatry. Does not require further inpatient cardiac monitoring. Patient, nursing staff, and EMS personnel in agreement w/ plan to transport.     Lois Guardado PA-C  Hospitalist Service  Pager [phone]

## 2017-06-07 NOTE — PROGRESS NOTES
Patient was admitted for 72 hour hold in behavorial health unit in [place] so no post dc follow up call will be done at this time as patient is currently still within system          Reason for Referral:  Discharge plan/ Suicide attempt/ ETOH OD/ Currently on 72 hour hold

## 2017-06-07 NOTE — PLAN OF CARE
Problem: Depressive Symptoms  Goal: Depressive Symptoms  Increased understanding of depressive feelings . Address issues underlying depressive feelings. Participate actively in individual and group counseling . Correct irrational thinking which leads to depression Address issues of dependence, helplessness, and hopelessness Decrease extreme symptoms of depression through improved coping  Will participate in unit programs.  Will be compliant with medications.  Will participate in discharge planning  Will identify and utilize healthy coping skills.  Will identify a safety plan to be in place for discharge.  Will identify healthy support system for after discharge.Signs and symptoms of listed problems will be absent or manageable.  Outcome: No Change  Patient admitted from [facility]. He is a 66 yo male who was found unresponsive by his daughter who came to check on him. He was intubated and chest comressions performed.  He was set to discharge home but his daughter found a suicide note written by patient stating he would rather die than go back to intermediate. He overdosed on alcohol and possibly effexor. He was recently in intermediate on 5/20/17 for 3 months for DUI's and will most likely have to return after discharge. Patient has had long history of alcohol abuse. Patients daughter who is currently staying with him did not know he startded to drink again until she found empty bottles in the home after he was admitted into the hospital.  He states he binge drinks. He has been here for CD treatment 12 years ago and at [facility]. He denies any mental health admissions. He has been medically cleared. He has severe chest pain from chest compression. They did a EKG prior to admission and on 6/3. In the reports patients daughter stated he does see a psychiatrist and takes medication for depression and anxiety. Patient has a poor support system. He states he is an . He denies any other chemical use. His blood alcohol  level at on 6/3 was 0.15. He denies SI and SIB. He states the only time he attempted suicide was just recently. He is on a 72 hour hold. He is calm and cooperative. He could not finish the admission profile with this writer due to chest pain. He is assigned to Dr. Ledbetter and accepted by Dr. Robbins.

## 2017-06-07 NOTE — PROGRESS NOTES
"Per pt, takes trazodone, seroquel, lorazepam (twice a day), metoprolol (unknown dose), simvastatin, effexor.  Pt is unsure of other medications, per pt \"I take about about 10 pills a day\".  Per pt, psychiatrist Pastor Ortega would know complete medication list.    "
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
## 2017-06-07 NOTE — PROGRESS NOTES
Pt was isolative to room for most of the day. Pt would come out of room to receive meds and eat meals. Pt spent a majority of the day reading. Pt had a visit with a daughter and the visit appeared to go well. Pts answers were very short. Pt seemed to be in a lot of pain during the day. Pt denied any thoughts of SI SIB.        06/07/17 1528   Behavioral Health   Hallucinations denies / not responding to hallucinations   Thinking intact   Orientation person: oriented;place: oriented;date: oriented;time: oriented   Memory baseline memory   Insight poor   Judgement impaired   Eye Contact into space   Affect blunted, flat   Mood mood is calm   Physical Appearance/Attire attire appropriate to age and situation   Hygiene well groomed   Suicidality other (see comments)  (denies)   Self Injury other (see comment)  (denies)   Activity isolative;withdrawn   Speech clear;coherent   Medication Sensitivity no stated side effects   Psychomotor / Gait balanced;steady   Activities of Daily Living   Hygiene/Grooming independent   Oral Hygiene independent   Dress scrubs (behavioral health)   Room Organization independent

## 2017-06-07 NOTE — PROGRESS NOTES
06/06/17 4532   Patient Belongings   Did you bring any home meds/supplements to the hospital?  No   Patient Belongings other (see comments)   Disposition of Belongings in Pt locker   Belongings Search Yes   Clothing Search Yes   Second Staff Bill (RN)     In Pt Belongings: shoes, magazines, hospital hygiene products,   Wallet is in locked box, dropped off by daughter this evening.       Admission:  I am responsible for any personal items that are not sent to the safe or pharmacy.  [facility] is not responsible for loss, theft or damage of any property in my possession.    Signature:  _________________________________ Date: _______  Time: _____                                              Staff Signature:  ____________________________ Date: ________  Time: _____      2nd Staff person, if patient is unable/unwilling to sign:    Signature: ________________________________ Date: ________  Time: _____     Discharge:  Laura has returned all of my personal belongings:    Signature: _________________________________ Date: ________  Time: _____                                          Staff Signature:  ____________________________ Date: ________  Time: _____

## 2017-06-08 LAB
ANION GAP SERPL CALCULATED.3IONS-SCNC: 11 MMOL/L (ref 3–14)
BUN SERPL-MCNC: 18 MG/DL (ref 7–30)
CALCIUM SERPL-MCNC: 8.9 MG/DL (ref 8.5–10.1)
CHLORIDE SERPL-SCNC: 107 MMOL/L (ref 94–109)
CO2 SERPL-SCNC: 26 MMOL/L (ref 20–32)
CREAT SERPL-MCNC: 0.72 MG/DL (ref 0.66–1.25)
GFR SERPL CREATININE-BSD FRML MDRD: NORMAL ML/MIN/1.7M2
GLUCOSE SERPL-MCNC: 95 MG/DL (ref 70–99)
PHOSPHATE SERPL-MCNC: 3.3 MG/DL (ref 2.5–4.5)
POTASSIUM SERPL-SCNC: 3.4 MMOL/L (ref 3.4–5.3)
SODIUM SERPL-SCNC: 144 MMOL/L (ref 133–144)

## 2017-06-08 RX ADMIN — OXYCODONE HYDROCHLORIDE 5 MG: 5 TABLET ORAL at 22:28

## 2017-06-08 RX ADMIN — SIMVASTATIN 40 MG: 10 TABLET, FILM COATED ORAL at 21:43

## 2017-06-08 RX ADMIN — VENLAFAXINE 25 MG: 25 TABLET ORAL at 18:11

## 2017-06-08 RX ADMIN — THERA TABS 1 TABLET: TAB at 08:51

## 2017-06-08 RX ADMIN — VENLAFAXINE 25 MG: 25 TABLET ORAL at 08:51

## 2017-06-08 RX ADMIN — ACETAMINOPHEN 1000 MG: 500 TABLET ORAL at 10:45

## 2017-06-08 RX ADMIN — NAPROXEN 500 MG: 500 TABLET ORAL at 18:11

## 2017-06-08 RX ADMIN — LIDOCAINE 1 PATCH: 50 PATCH CUTANEOUS at 13:20

## 2017-06-08 RX ADMIN — OXYCODONE HYDROCHLORIDE 5 MG: 5 TABLET ORAL at 09:05

## 2017-06-08 RX ADMIN — TRAZODONE HYDROCHLORIDE 100 MG: 100 TABLET ORAL at 21:43

## 2017-06-08 RX ADMIN — TRAZODONE HYDROCHLORIDE 50 MG: 50 TABLET ORAL at 22:28

## 2017-06-08 RX ADMIN — METOPROLOL SUCCINATE 100 MG: 100 TABLET, FILM COATED, EXTENDED RELEASE ORAL at 08:51

## 2017-06-08 RX ADMIN — NAPROXEN 500 MG: 500 TABLET ORAL at 08:51

## 2017-06-08 ASSESSMENT — ACTIVITIES OF DAILY LIVING (ADL)
ORAL_HYGIENE: INDEPENDENT
DRESS: INDEPENDENT
DRESS: SCRUBS (BEHAVIORAL HEALTH);INDEPENDENT
ORAL_HYGIENE: INDEPENDENT
GROOMING: INDEPENDENT
GROOMING: INDEPENDENT

## 2017-06-08 NOTE — PROGRESS NOTES
[facility]   Dr. KEELY CARDENAS'S  Psychiatric Progress Note    Patient:  Jong Galarza   Medical Record Number:  [id number]  Room:  N109/N109-02  :  1950  Date Seen:  2017        Interim History:   The patient's care was discussed with the treatment team and chart notes were reviewed.    Patient is in conference with his  : Gama Diehl : [phone]( office )     Also has cell phone: [phone];   Eventually able to reach his  on cell phone ;     Psychiatric ROS:  Mood:depressed and anxious  Sleep:No concerns, sleeps well through night  Appetite:decreased  Eating:normal  Energy Level:HIGH  Concentration/Memory Problems:Yes  Suicidal Thoughts:No  Homicidal Thoughts:No  Psychotic Symptoms: No  Medication Side Effects:No  Medication Compliance:Yes   Physical Complaints:normal         Medications:     Current Facility-Administered Medications   Medication     metoprolol (TOPROL-XL) 24 hr tablet 100 mg     simvastatin (ZOCOR) tablet 40 mg     traZODone (DESYREL) tablet 100 mg     venlafaxine (EFFEXOR) tablet 25 mg     lidocaine (LIDODERM) 5 % Patch 1 patch    And     lidocaine (LIDODERM) patch REMOVAL     acetaminophen (TYLENOL) tablet 1,000 mg     multivitamin, therapeutic (THERA-VIT) tablet 1 tablet     naproxen (NAPROSYN) tablet 500 mg     oxyCODONE (ROXICODONE) IR tablet 5 mg     hydrOXYzine (ATARAX) tablet 25-50 mg     alum & mag hydroxide-simethicone (MYLANTA ES/MAALOX  ES) suspension 30 mL     magnesium hydroxide (MILK OF MAGNESIA) suspension 30 mL     bisacodyl (DULCOLAX) Suppository 10 mg     traZODone (DESYREL) tablet 50 mg     OLANZapine (zyPREXA) tablet 5-10 mg    Or     OLANZapine (zyPREXA) injection 5 mg     menthol (ICY HOT) 5 % patch 1 patch     naloxone (NARCAN) injection 0.1-0.4 mg     menthol (ICY HOT) patch REMOVAL             Allergies:     Allergies   Allergen Reactions     Sulfa Drugs             Psychiatric Examination:  "  BP (!) 157/96  Pulse 74  Temp 98.1  F (36.7  C) (Oral)  Resp 16  Ht 1.702 m (5' 7\")  Wt 69.8 kg (153 lb 12.8 oz)  BMI 24.09 kg/m2  Weight is 153 lbs 12.8 oz  Body mass index is 24.09 kg/(m^2).    Appearance:  dressed in hospital scrubs, appeared older than stated age and poorly groomed  Attitude:  somewhat cooperative  Eye Contact:  intense  Mood:  angry and anxious  Affect:  appropriate and in normal range  Speech:  clear, coherent  Psychomotor Behavior:  no evidence of tardive dyskinesia, dystonia, or tics  Throught Process:  linear  Associations:  no loose associations  Thought Content:  no auditory hallucinations present and no visual hallucinations present  Insight:  limited  Judgement:  limited  Oriented to:  time, person, and place  Attention Span and Concentration:  limited  Recent and Remote Memory:  limited  Gait:Slow    Risk/Potential for Dangerousness:  Multiple Active Diagnoses:LOW  Self Care:LOW  Suicide:LOW  Assault:LOW  Self Injurious Behaviors:LOW  Inappropriate Sexual Behavior:LOW         Labs:     Recent Results (from the past 24 hour(s))   Basic metabolic panel    Collection Time: 06/08/17  7:42 AM   Result Value Ref Range    Sodium 144 133 - 144 mmol/L    Potassium 3.4 3.4 - 5.3 mmol/L    Chloride 107 94 - 109 mmol/L    Carbon Dioxide 26 20 - 32 mmol/L    Anion Gap 11 3 - 14 mmol/L    Glucose 95 70 - 99 mg/dL    Urea Nitrogen 18 7 - 30 mg/dL    Creatinine 0.72 0.66 - 1.25 mg/dL    GFR Estimate >90  Non  GFR Calc   >60 mL/min/1.7m2    GFR Estimate If Black >90   GFR Calc   >60 mL/min/1.7m2    Calcium 8.9 8.5 - 10.1 mg/dL   Phosphorus    Collection Time: 06/08/17  7:42 AM   Result Value Ref Range    Phosphorus 3.3 2.5 - 4.5 mg/dL          Impression:   This is a 67 year old male  Adhere to the current medication regime          DIagnoses:   DIAGNOSES DSM-5, ICD-10:  Include the following   1.  Depression, major, recurrent.   2.  Chemical dependency including " "acute, subacute alcoholism.   3.  .  The CT scan showed \"atrophy.\"              Plan:     Med Changes as Follows:none  Discussed Risks/Benefits/Side Effects/Alternatives: YES  Able to give informed consent:  YES   Precautions:none    Code:2  Legal Status:per admission   Expected Disposition:pe admission :     [facility]        "

## 2017-06-08 NOTE — PLAN OF CARE
"Problem: Depressive Symptoms  Goal: Depressive Symptoms  Increased understanding of depressive feelings . Address issues underlying depressive feelings. Participate actively in individual and group counseling . Correct irrational thinking which leads to depression Address issues of dependence, helplessness, and hopelessness Decrease extreme symptoms of depression through improved coping  Will participate in unit programs.  Will be compliant with medications.  Will participate in discharge planning  Will identify and utilize healthy coping skills.  Will identify a safety plan to be in place for discharge.  Will identify healthy support system for after discharge.Signs and symptoms of listed problems will be absent or manageable.   Outcome: Improving  48 hour nursing observation      mental health  Suicidal ideation     Admit Date: 6/6/2017     Patient evaluation continues. Assessed mood,anxiety,thoughts and behavior. Patient is progressing towards goals. Patient is encouraged to participate in groups and assisted to develop healthy coping skills.       BP (!) 157/96  Pulse 74  Temp 98.1  F (36.7  C) (Oral)  Resp 16  Ht 1.702 m (5' 7\")  Wt 69.8 kg (153 lb 12.8 oz)  BMI 24.09 kg/m2     Patient reports depression level of 5 and anxiety level of  5 with 10 being the worst. Patient's affect is more full range today, some smiling at times. Patient denies SI, denies SIB, denies HI and states he does feel hopeful about the future. Patient states concentration is \"wish it wasn't so good\" and acknowledges racing thoughts.  Patient denies auditory or visual hallucinations. Patient reports sleeping 8 hours last night, \"slept really well\" and appetite \"good\".  Patient is compliant with medications and side effects reported are none. Patient attended no groups, visible for short periods in the lounge but also resting in bed at intervals.  ADLs are adequate.  B/P's improving some (please see VS Flow Sheet).       Pt reported " "that his right upper chest wall pain was more severe this AM, \"wasn't this bad yesterday\" but greatly decreased after prn Oxycodone, prn Tylenol and Lidoderm patch.  Pt states his patch fell off before MN last night so wanted to try it during the day so applied at 1330. Pt cooperative with finishing RN Admission Profile.  Pleasant, appreciative.  Pt states the Dr said he could be discharged tomorrow and will be staying with his daughter either at their house or stay with a brother-in-law.  Pt has OP psychiatric appt Mon and then he stated his Hearing (for DWI) is on Tuesday.  Refer to daily team meeting notes for individualized plan of care. Nursing will continue to observe and assess.                   "

## 2017-06-08 NOTE — PROGRESS NOTES
NON ATTENDANCE:      06/08/17 1500   General Information   Has Not Attended OT as of: 06/08/17   Encourage Attendance and Participation.

## 2017-06-08 NOTE — PROGRESS NOTES
"Met with the patient to discuss treatment planning. He plans to discharge tomorrow.  He will be living with his daughter in [place] and will continue to see Dr. Pastor Ortega Addiction Psychiatry for care at [facility] in [place].  His court hearing is scheduled for Tuesday, 6/13 at 9am in [facility] Criminal Court and he will have to go back to intermediate due to continued abuse of alcohol.  Let him know that his daughter reported he had firearms in the home in the Concerned Persons Statement she left for us.   Patient states that the firearms were removed over a year ago. \"I am a felon and it is against the law for me to possess firearms.\"  Let the patient know that under my license, I still had to place a call to his daughter.  I had to leave her a voicemail asking her to call me about the firearms she mentioned and that if there are any in the home, she must remove them.  Patient's daughter Fay called back.  She acknowledged that they had moved in the last year and the firearms were gone.      Patient's brother Marcello called asking about the treatment plan.  Patient gave me verbal permission to let him know he is seeing the psychiatrist on Monday and will go back to court at 9am on Tuesday.  He thanked me for the information and said he would pass it on to patient's daughter Fay.  "

## 2017-06-08 NOTE — H&P
"CHIEF COMPLAINT:  Depression with suicidal intent with the patient unresponsive at time of examination.  Presented via EMS.  Last contact with his daughter was 2:00 p.m. on the day of admission, and by the time she returned at 5:00 p.m. she was unresponsive in the chair.  The patient showed no evidence of trauma.  Significantly unresponsive and trouble breathing.      ALLERGIES:  Not defined.      MEDICATIONS:  At the time of admission include the following:  Aciphex, Lipitor.      PSYCHIATRIC MEDICATIONS:  Ativan, Desyrel, Effexor, .      PAST MEDICAL HISTORY:  , depression.      PAST SURGICAL HISTORY:  None.      REVIEW OF SYSTEMS:  From a medical standpoint, gravity of the patient's admission.     NEUROLOGICAL:  Unresponsive.  GCS ([place] coma scale) 3.  No response to pain.  No corneal reflexes.      EKG:  Unresponsive.      Weight 123.      IMAGIN.  Head CT without contrast per Radiology shows cerebellar atrophy,no evidence of intracranial hemorrhage or acute process.     2.  Cervical spine CT without contrast per Radiology:  Degenerative changes.  No evidence for fracture or any posterior malalignment.     3.  Portable chest x-ray negative.        DRUG SCREEN:  Blood alcohol 0.15.      Also the patient admits to  \"a lot of pills,\" 6 tablets.  Indicates he had  prescriptions from his primary outpatient treating psychiatrist.  His daughter has cleared all of these out at this time; therefore, he has these no longer available.      Drug screen had been negative for benzos on urine drug screen.  To reiterate the drug screen is negative including for mild tranquilizers.  It would appear that no other drugs had shown or been identified.  To reiterate, the blood alcohol was  at the time of admission.         patient identifies medical diagnosis go back to  with erectile dysfunction.        Indicates he was recently incarcerated; therefore, had no alcohol or other drug withdrawal; was unresponsive at the " "time of this admission.        It would appear he was acting his usual self, no strange behavior or complaints.        The patient reiterates that he was incarcerated x2, and his legal history, DUIs on 4 occasions, 2002 blood alcohol  and  2012 on 2 occasions.  In 2016 refused to have blood alcohol done; indicates in 03/2017 that he was then incarcerated for 30 days and had been tested on a daily basis through 2013 including \"blowing into a machine.\"  Interlock to start the car and wore a bracelet on his ankle for 6 months in 2016, would be tested on a weekly basis randomly.  He was negative for all of the following  2 occasions he was positive for alcohol with a blood alcohol level of 0.119.  He indicates  \"glass of wine\" but .      To reiterate the blood alcohol in 01/2017 with 0.01.      He identifies all these cases were settled in [facility], DUI court.  Chemical dependency  2431-2420.        He indicates he has been to CD treatment on a number of occasions starting in 2006 for 30 days.       [facility] with I.  Checked on a daily basis in 4427-6812.        He did go to [facility] for 30 days in 2013.        In the past he has been tried on Antabuse or some form of it regularly since 2012.         naltrexone.  He is not clear  most recently.      Asked him about Vivitrol.   naltrexone once a month, indicates that this was not .      He does reiterate  daily monitoring and AA affiliation is continued through [facility] DUI Court [id number].       In terms of previous suicide, homicide, self-injury behavior , the patient had never tried to \"harm himself\" or attempt suicide in the past.  It would appear  recent strange behavior .       It should be noted the staff did confer with the patient's daughter, Kiah, via phone [phone].      The psychiatric consultation of 06/06/2017 performed by Dr. Montana Guzman identifies that the patient has shown the following:  Long and established " "history of alcoholism, long and established history of depression.      Current history indicates the patient has a long and established history with outpatient Psychiatry with   in [id number] for 2 years.      Currently with Dr. Pastor Ortega yet the patient would not sign a release of information for staff to obtain information from Dr. Ortega and release records.  The patient has been under his care from  to  and visits perhaps once a month.      Medication regimen includes the followin.  Lorazepam 1 mg daily.     2.  Trazodone 200 mg daily and at bedime.   3.  Venlafaxine 75 b.i.d.        The patient also mentioned quetiapine or Seroquel, but the pharmacy is not able to track this.        With regard to his recent incarceration in [facility], he reiterates he might have \"to back to [facility]\" in that he is now in violation of his sobriety pact.      He reiterates his reservation about  [facility] in ;  robotic surgery for his prostate cancer.  Reports it occurred almost 10 years ago.       staff has been in contact with the treatment team and stated that \"found a suicide note\" and it appears the patient preferred killing himself instead of going back to [facility], but patient emphasizes \"only had 6.\"       emphasizing  longer wish to kill himself has been consistent in denying suicide.        PAST MEDICAL HISTORY:  GERD,  in , received robotic-assisted prostatectomy, open abdominal exploration.      ALLERGIES:  None.      FAMILY AND BACKGROUND HISTORY:  Two maternal uncles had successfully completed suicide, one by alcohol ingestion; he was only 36.  The other one by drowning at age 50.  The patient as a teenager  less than age 51.  Identifies both parents and uncles  \"alcoholics.\"        SOCIAL HISTORY:  Indicates he worked as an , went to the [employer]   incarcerated; long history of alcohol use.        REVIEW OF SYSTEMS:  From a medical standpoint " "has been shown that the patient wears glasses, hard of hearing and reports significant chest pain and occasional shortness of breath.        FORMAL MENTAL STATUS EXAMINATION:  Generally pleasant and cooperative; he refused to cooperate with release of information.  Indicates \".\"  Mood is certainly one of dysphoria and concern and distress and anxiety.  Affect is restricted.  His speech is coherent and goal oriented.  His associations remain tight and intact.  His thought process is generally logical and linear and does not have any insight into the seriousness of the suicide attempt.  Content of thought is currently without psychosis and currently denies suicidal ideation and intention.  Recent and remote memory seem to be intact.  Concentration and fond of knowledge is intact.  Is able to spell world backward and repeat words rapidly and accurately and oriented in all 3 spheres  present.  Insight and judgment are quite guarded.  Generally muscle tone and strength seem to be intact.  Vital signs are stable.        DIAGNOSES DSM-5, ICD-10:  Include the following   1.  Depression, major, recurrent.   2.  Chemical dependency including acute, subacute alcoholism.   3.  .  The CT scan showed \"atrophy.\"        Additionally, social and background history shows the following:  The patient identifies his daughter is in fact not a teenager but 19 or 20.  Attends college in [place].  She just recently \"broke up with a boyfriend.\"  Generally quite \"close\" to family members, particularly on his wife's side.        The patient is a  and has a teenage daughter.  Feels his daughter is independent and autonomous.  Father  from throat cancer at age 63.  The patient was only 16.  Mother  at age 88 and that was in .  The patient indicates it was before his wife .  Wife  in , .  The patient indicates she  of \"melanoma,\" he corrects this record; she is still alive, she is coming to visit [facility].       As " far as life history, as far as abuse and death not identified.      Living situation, to reiterate living with a daughter in [place] as indicated.      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        Fiscally dependent on his own time and money and of course social security.  His living situation, education, occupation and finances are all tied together.  He maintains his own practice and lives off his savings and Medicare and [employer] is his secondary insurance.      LEGAL ISSUES:  To reiterate, recently incarcerated, suggest he was for several months and released on 2017 when I revisit this, and it would appear it might be quite a bit more abbreviated, and the suggestion if he does get back to detention it would be for an abbreviated period or perhaps just days.      ETHNIC, SPIRITUAL AND  HISTORY:  Has not defined.        DIAGNOSES, DSM-5, ICD-10:  Include the followin.  Depression, major recurrent.   2.  Chemical dependency including acute, subacute, chronic alcoholism.   3.  Premorbid personality including base personality disorder.   4.  Chemical dependency has been identified.     5.  One also has to be aware of the iatrogenic use and abuse of prescription medication.     6.   diagnosis, question of atrophy that has been defined with the CT scan.      ADDITIONAL MEDICATION REGIMEN AND TREATMENT:     1.  Restart Desyrel at 100 mg each day at bedtime.   2.  Effexor 25 b.i.d. was restarted.   3.  They plan to restart the Ativan   4.   Risperdal 0.5 mg q.4 h. p.r.n. available for anxiety.      Baseline diagnostic assessment and testing including psychological testing appears to carry some dominant baseline neurological assessments including outpatient neurological elective assessment.         VELMA CARDENAS MD             D: 2017 21:06   T: 2017 05:39   MT: [email or web address]      Name:     FABIO BERKOWITZ   MRN:      [id number]         Account:      [id number]   :      1950           Admitted:     [id number]      Document: [id number]       cc: Pastor Ortega MD

## 2017-06-08 NOTE — DISCHARGE INSTRUCTIONS
Behavioral Discharge Planning and Instructions    Summary: You were admitted to psychiatry service from the medical unit after presented and treated for overdose of alcohol in suicide attempt.   During your hospitalization, your mood and symptoms were stabilized. Programming was offered daily and the psychiatrist met with you.  Medications were adjusted.  You are being discharged home today to resume care with your outpatient psychiatrist and follow up with your  and court system.      Main Diagnosis: Major Depressive Disorder; Alcohol Abuse Disorder    Major Treatments, Procedures and Findings: Take all medications as prescribed.    Symptoms to Report: increased confusion or thoughts of suicide    Psychiatry Follow-up:   Dr. Pastor Ortega - Next Appt on Monday June12th at 12:20pm  [facility] in [place]  [phone]  FAX: [phone]    Court Hearing at 9am on Tuesday, June 13th  [employer]    Release of Information/Official Copies of Medical Records call [phone]    Resources:   Crisis Intervention: [phone] or [phone] (TTY: [phone]).  Call anytime for help.  Alcoholics Anonymous (www.alcoholics-anonymous.org): Check your phone book for your local chapter.    General Medication Instructions:   See your medication sheet(s) for instructions.   Take all medicines as directed.  Make no changes unless your doctor suggests them.   Go to all your doctor visits.  Be sure to have all your required lab tests. This way, your medicines can be refilled on time.  Do not use any drugs not prescribed by your doctor.  Avoid alcohol.    The treatment team has appreciated the opportunity to work with you.  We wish you the best in the future. If you have any questions or concerns our unit number is [phone].

## 2017-06-08 NOTE — PLAN OF CARE
"Problem: General Plan of Care (Inpatient Behavioral)  Goal: Individualization/Patient Specific Goal (IP Behavioral)  The patient and/or their representative will achieve their patient-specific goals related to the plan of care.    The patient-specific goals include: Illness Management Recovery model: Objectives    Patient will identify reason(s) for hospitalization from their perspective.  Patient will identify a minimum of three goals for discharge.  Patient will identify a minimum of three triggers that may increase their symptoms.  Patient will identify a minimum of three coping skills they can do to stay well.   Patient will identify their support system to demonstrate readiness for discharge.   Illness Management Recovery model:  Feedback.     Patient identified the following people they would like to receive feedback from if/when they see early warning signs:     Family(s): \"My daughter and brother\".     Support Group Member(s): \"AA meetings and my AA Sponsor, my OP psychiatrist\".     AFSHAN RAMOS RN-6/8/2017                   "

## 2017-06-08 NOTE — PROGRESS NOTES
"   06/07/17 2212   Behavioral Health   Hallucinations denies / not responding to hallucinations   Thinking intact   Orientation person: oriented;place: oriented;date: oriented;time: oriented   Memory baseline memory   Insight admits / accepts   Judgement intact   Eye Contact at examiner   Affect blunted, flat   Mood mood is calm   Physical Appearance/Attire appears stated age   Hygiene well groomed   Suicidality other (see comments)  (denies)   Self Injury other (see comment)  (denies)   Activity withdrawn   Speech clear;coherent   Medication Sensitivity no stated side effects   Psychomotor / Gait balanced;steady   Psycho Education   Type of Intervention 1:1 intervention   Response participates, initiates socially appropriate   Hours 0.5   Treatment Detail check in   Activities of Daily Living   Hygiene/Grooming independent   Oral Hygiene independent   Dress scrubs (behavioral health)   Room Organization independent   Behavioral Health Interventions   Depression assist with developing and utilizing healthy coping strategies   Social and Therapeutic Interventions (Depression) encourage participation in therapeutic groups and milieu activities   Pt is up and about in lounge mainly watching TV.  Pleasant on approach.  States he relapsed, drank for a day or so, was suicidal at that moment.  Right now is not suicidal.  Wants to get into treatment and get better. C/O chest pain from the chest compressions the EMTs did.  He doesn't feel he was as \"close to death\" as they said.  The relapse of drinking and SI attempt was due to current life stressors and chronic depression since losing his wife to cancer 10 years ago and having his prostate cancer surgery, which \"almost killed him.\"   "

## 2017-06-09 VITALS
RESPIRATION RATE: 16 BRPM | SYSTOLIC BLOOD PRESSURE: 157 MMHG | WEIGHT: 153.8 LBS | TEMPERATURE: 98.5 F | HEART RATE: 74 BPM | BODY MASS INDEX: 24.14 KG/M2 | DIASTOLIC BLOOD PRESSURE: 96 MMHG | HEIGHT: 67 IN

## 2017-06-09 LAB
BACTERIA SPEC CULT: NO GROWTH
BACTERIA SPEC CULT: NO GROWTH
Lab: NORMAL
Lab: NORMAL
MICRO REPORT STATUS: NORMAL
MICRO REPORT STATUS: NORMAL
SPECIMEN SOURCE: NORMAL
SPECIMEN SOURCE: NORMAL

## 2017-06-09 RX ORDER — LIDOCAINE 50 MG/G
1 PATCH TOPICAL EVERY 24 HOURS
Qty: 30 PATCH | Refills: 3 | Status: ON HOLD | OUTPATIENT
Start: 2017-06-09 | End: 2020-09-01

## 2017-06-09 RX ORDER — RABEPRAZOLE SODIUM 20 MG/1
20 TABLET, DELAYED RELEASE ORAL DAILY
Qty: 30 TABLET | Refills: 3 | Status: ON HOLD | OUTPATIENT
Start: 2017-06-09 | End: 2020-09-01

## 2017-06-09 RX ADMIN — OXYCODONE HYDROCHLORIDE 5 MG: 5 TABLET ORAL at 10:45

## 2017-06-09 RX ADMIN — NAPROXEN 500 MG: 500 TABLET ORAL at 09:23

## 2017-06-09 RX ADMIN — METOPROLOL SUCCINATE 100 MG: 100 TABLET, FILM COATED, EXTENDED RELEASE ORAL at 09:23

## 2017-06-09 RX ADMIN — LIDOCAINE 1 PATCH: 50 PATCH CUTANEOUS at 12:03

## 2017-06-09 RX ADMIN — THERA TABS 1 TABLET: TAB at 09:23

## 2017-06-09 RX ADMIN — VENLAFAXINE 25 MG: 25 TABLET ORAL at 09:23

## 2017-06-09 ASSESSMENT — ACTIVITIES OF DAILY LIVING (ADL)
DRESS: INDEPENDENT
LAUNDRY: WITH SUPERVISION
ORAL_HYGIENE: INDEPENDENT
GROOMING: INDEPENDENT

## 2017-06-09 NOTE — PLAN OF CARE
"Problem: Depressive Symptoms  Goal: Depressive Symptoms  Increased understanding of depressive feelings . Address issues underlying depressive feelings. Participate actively in individual and group counseling . Correct irrational thinking which leads to depression Address issues of dependence, helplessness, and hopelessness Decrease extreme symptoms of depression through improved coping  Will participate in unit programs.  Will be compliant with medications.  Will participate in discharge planning  Will identify and utilize healthy coping skills.  Will identify a safety plan to be in place for discharge.  Will identify healthy support system for after discharge.Signs and symptoms of listed problems will be absent or manageable.   Outcome: Improving  Jong today reports he is feeling relief with dx from Dr Ledbetter-stating it helps to understand decreased cognitive functioning-states although he is not happy about legal issues he feels ready to go to court, \"I have the best  in the [facility].\"-states he will complete his sentence if he receives one and will move on from there-talked about remodeling home he bought in [place] and of other interests-also spoke of great degree of support from his daughter and siblings-reviewed outpt tx plans and medication schedule-prescriptions sent to outpt pharmacy-states has meds not ordered at home-states intention to attend MD appt on Monday stating court hearing is actually on Tuesday-Jong discharged 1640 care of daughter to return home      "

## 2017-06-09 NOTE — PROGRESS NOTES
06/08/17 2250   Significant Event   Significant Event Other (see comments)  (shift summary)     Pt had a good evening, isolative and withdraw to room.  While in his room, pt was reading a book.  Denies SI, SIB and hallucinations.  Denies any mental health symptoms.

## 2017-06-10 NOTE — DISCHARGE SUMMARY
"DATE OF ADMISSION:  2017      DATE OF DISCHARGE:  2017      CHIEF COMPLAINT:  Depression with suicidal intent.      HISTORY OF PRESENT MEDICAL ILLNESS:  Jong Snow is a 67-year-old male, an , residing in the household with his daughter, had lived on [facility] all these years, sold the house and now moved into a smaller location.  Identifies that the daughter was absent for 3 hours and when she returned at 5 p.m., she found the patient unresponsive with no evidence of trauma and significant unresponsive and trouble breathing.      ALLERGIES:  Not defined.         Did leave a suicide note at the time.      MEDICATIONS AT TIME OF ADMISSION:  Include the followin.  Lipitor.   2.  Aciphex.        Psychotropic medications include the followin.  Ativan.   2.  Desyrel.   3.  Effexor.      PAST AND RECENT MEDICAL AND PSYCHIATRIC HISTORY:  Include depression.      PAST SURGICAL HISTORY:  None.      REVIEW OF SYSTEMS:  From a medical standpoint show the grave medical circumstances of his admission unresponsive, GCS (Dariela Coma Scale of 3), unresponsive to pain, no corneal reflexes.      The EKG was unresponsive.      Weight 123.      IMAGING:  With emphasis on the following:  Head CT with contrast showed some general cerebral atrophy, mild to moderate, with no evidence of intracranial trauma.      Remainder of the radiological, including cervical spine and portable chest x-ray, was negative.      Blood alcohol 0.15.      Admits to taking \"lots of pills.\"  It would appear that he had 7-8 different types of pills available and selected to take possibly 5 or 6.      It should be noted that from a legal basis, the patient is also struggling.        His blood alcohol as indicated was positive for blood alcohol 0.19.      The patient admits to taking \"a glass of wine.\"      CD issues are rather comprehensive, incorporate the last 3 years, starting in  with the patient on a number of " occasions had exposure to CD treatment, in  lasted for for 30 days.      He has also had issues with DUI.  Indicates that he has also been tried on Antabuse, uses irregularly since ; Vivitrol (IM naltrexone) once a month, ostensibly has not been tried at this point.      I did confer with the patient's , and the patient is fully in cooperation and agreement for us to hold conference call with his  and with the patient; Gama Diehl, at telephone [phone] office, cell phone [phone].      Additionally, did confer with the patient's daughter, Alysa, at [phone], who is agreeable for the following:   Clear out all the patient's medications  establish medication.      Make sure he follows up with his primary outpatient treating psychiatrist.      Continue on his medications only.      The patient indicates that he has most recent medications available and will be conferring with his primary outpatient treating psychiatrist within the next 1 week.      DIAGNOSES:  Considered diagnoses therefore include the followin.  Depression, major, recurrent.   2.  Chemical dependency, acute, subacute.   3.  Chronic alcoholism.      CT scan showed atrophy (mild to moderate) of the cerebrum.        Emphasized to the patient and also his  that because of a combination of the degree and the depth of his depression, along with the degree and the depth of his cerebral atrophy, he certainly has shown a decline to his ability to function, exercise foresight, planning anticipation and reduced judgment; combined with any minimal amount of alcohol certainly reduces his controls and disinhibits him even more to the point the patient would then act out on his impulsive conduct.      TREATMENT:  Being discharged and the discharge medication regimen has been outlined.      DISCHARGE MEDICATIONS:   Nonpsychiatric medication includin.  Lidoderm 5% patch that he actually is wearing.   2.   Toprol-XL  mg daily.   3.  Multivitamin 1 daily.   4.  Naprosyn 500 mg b.i.d. with meals.   5.  Zocor 40 mg at bedtime.   6.  Comfort medication.      Psychiatric medications, patient has his own at home, includin.  Desyrel 100 at bedtime.    2.  Venlafaxine (Effexor) 25 twice a day.   expectation, gradually increase.   3.  Certainly the patient needs to be educated and revisited as to the need for.   4.  Antabuse.   5.  Naltrexone, including long-acting IM naltrexone.         VELMA CARDENAS MD             D: 2017 14:28   T: 06/10/2017 18:14   MT: ADAN      Name:     FABIO BERKOWITZ   MRN:      [id number]        Account:        [id number]   :      1950           Admit Date:                                       Discharge Date: 2017      Document: [id number]       cc: Pastor Ortega MD

## 2020-03-01 NOTE — PROGRESS NOTES
"Pt transferring to [facility] psych at approximately 1945.  PIV removed.  All belongings sent with pt.  Report given to [facility] RN.      Add: while transferring pt to [facility] he c/o \"new\" chest pain.  VSS.  Gold crosscover notified; plan to obtain EKG if unchanged, pt will discharge to Psychiatric.  " None

## 2020-08-31 ENCOUNTER — HOSPITAL ENCOUNTER (INPATIENT)
Facility: CLINIC | Age: 70
LOS: 8 days | Discharge: HOME | DRG: 896 | End: 2020-09-09
Attending: EMERGENCY MEDICINE | Admitting: INTERNAL MEDICINE
Payer: COMMERCIAL

## 2020-08-31 DIAGNOSIS — S06.5XAA SUBDURAL HEMATOMA (H): ICD-10-CM

## 2020-08-31 DIAGNOSIS — R41.82 ALTERED MENTAL STATUS, UNSPECIFIED ALTERED MENTAL STATUS TYPE: ICD-10-CM

## 2020-08-31 LAB
ALBUMIN SERPL-MCNC: 3.8 G/DL (ref 3.4–5)
ALP SERPL-CCNC: 129 U/L (ref 40–150)
ALT SERPL W P-5'-P-CCNC: 146 U/L (ref 0–70)
ANION GAP SERPL CALCULATED.3IONS-SCNC: 20 MMOL/L (ref 3–14)
AST SERPL W P-5'-P-CCNC: 269 U/L (ref 0–45)
BASOPHILS # BLD AUTO: 0 10E9/L (ref 0–0.2)
BASOPHILS NFR BLD AUTO: 0 %
BILIRUB SERPL-MCNC: 2.2 MG/DL (ref 0.2–1.3)
BUN SERPL-MCNC: 54 MG/DL (ref 7–30)
CALCIUM SERPL-MCNC: 9.2 MG/DL (ref 8.5–10.1)
CHLORIDE SERPL-SCNC: 103 MMOL/L (ref 94–109)
CO2 SERPL-SCNC: 17 MMOL/L (ref 20–32)
CREAT SERPL-MCNC: 1.18 MG/DL (ref 0.66–1.25)
DIFFERENTIAL METHOD BLD: ABNORMAL
EOSINOPHIL # BLD AUTO: 0 10E9/L (ref 0–0.7)
EOSINOPHIL NFR BLD AUTO: 0.1 %
ERYTHROCYTE [DISTWIDTH] IN BLOOD BY AUTOMATED COUNT: 13.4 % (ref 10–15)
GFR SERPL CREATININE-BSD FRML MDRD: 62 ML/MIN/{1.73_M2}
GLUCOSE SERPL-MCNC: 116 MG/DL (ref 70–99)
HCT VFR BLD AUTO: 39.9 % (ref 40–53)
HGB BLD-MCNC: 13.9 G/DL (ref 13.3–17.7)
IMM GRANULOCYTES # BLD: 0 10E9/L (ref 0–0.4)
IMM GRANULOCYTES NFR BLD: 0.3 %
LYMPHOCYTES # BLD AUTO: 0.4 10E9/L (ref 0.8–5.3)
LYMPHOCYTES NFR BLD AUTO: 3.2 %
MCH RBC QN AUTO: 33.9 PG (ref 26.5–33)
MCHC RBC AUTO-ENTMCNC: 34.8 G/DL (ref 31.5–36.5)
MCV RBC AUTO: 97 FL (ref 78–100)
MONOCYTES # BLD AUTO: 0.7 10E9/L (ref 0–1.3)
MONOCYTES NFR BLD AUTO: 6.2 %
NEUTROPHILS # BLD AUTO: 10.5 10E9/L (ref 1.6–8.3)
NEUTROPHILS NFR BLD AUTO: 90.2 %
NRBC # BLD AUTO: 0 10*3/UL
NRBC BLD AUTO-RTO: 0 /100
PLATELET # BLD AUTO: 122 10E9/L (ref 150–450)
POTASSIUM SERPL-SCNC: 3.4 MMOL/L (ref 3.4–5.3)
PROT SERPL-MCNC: 6.8 G/DL (ref 6.8–8.8)
RBC # BLD AUTO: 4.1 10E12/L (ref 4.4–5.9)
SODIUM SERPL-SCNC: 140 MMOL/L (ref 133–144)
WBC # BLD AUTO: 11.6 10E9/L (ref 4–11)

## 2020-08-31 PROCEDURE — 80053 COMPREHEN METABOLIC PANEL: CPT | Performed by: EMERGENCY MEDICINE

## 2020-08-31 PROCEDURE — 25000125 ZZHC RX 250: Performed by: EMERGENCY MEDICINE

## 2020-08-31 PROCEDURE — 96361 HYDRATE IV INFUSION ADD-ON: CPT

## 2020-08-31 PROCEDURE — 99285 EMERGENCY DEPT VISIT HI MDM: CPT | Mod: 25

## 2020-08-31 PROCEDURE — 96376 TX/PRO/DX INJ SAME DRUG ADON: CPT

## 2020-08-31 PROCEDURE — 25800030 ZZH RX IP 258 OP 636: Performed by: EMERGENCY MEDICINE

## 2020-08-31 PROCEDURE — 96374 THER/PROPH/DIAG INJ IV PUSH: CPT

## 2020-08-31 PROCEDURE — 85025 COMPLETE CBC W/AUTO DIFF WBC: CPT | Performed by: EMERGENCY MEDICINE

## 2020-08-31 PROCEDURE — C9803 HOPD COVID-19 SPEC COLLECT: HCPCS

## 2020-08-31 PROCEDURE — 25000128 H RX IP 250 OP 636: Performed by: EMERGENCY MEDICINE

## 2020-08-31 RX ORDER — LORAZEPAM 2 MG/ML
2 INJECTION INTRAMUSCULAR
Status: DISCONTINUED | OUTPATIENT
Start: 2020-08-31 | End: 2020-09-09

## 2020-08-31 RX ORDER — LORAZEPAM 2 MG/ML
1 INJECTION INTRAMUSCULAR ONCE
Status: COMPLETED | OUTPATIENT
Start: 2020-08-31 | End: 2020-08-31

## 2020-08-31 RX ADMIN — LORAZEPAM 1 MG: 2 INJECTION INTRAMUSCULAR; INTRAVENOUS at 19:43

## 2020-08-31 RX ADMIN — LORAZEPAM 2 MG: 2 INJECTION INTRAMUSCULAR; INTRAVENOUS at 21:53

## 2020-08-31 RX ADMIN — FOLIC ACID: 5 INJECTION, SOLUTION INTRAMUSCULAR; INTRAVENOUS; SUBCUTANEOUS at 20:06

## 2020-08-31 ASSESSMENT — ENCOUNTER SYMPTOMS
NAUSEA: 1
DIZZINESS: 1
FEVER: 1
COUGH: 0
VOMITING: 1
SHORTNESS OF BREATH: 0

## 2020-09-01 ENCOUNTER — APPOINTMENT (OUTPATIENT)
Dept: CT IMAGING | Facility: CLINIC | Age: 70
DRG: 896 | End: 2020-09-01
Attending: PHYSICIAN ASSISTANT
Payer: COMMERCIAL

## 2020-09-01 ENCOUNTER — APPOINTMENT (OUTPATIENT)
Dept: CT IMAGING | Facility: CLINIC | Age: 70
DRG: 896 | End: 2020-09-01
Attending: EMERGENCY MEDICINE
Payer: COMMERCIAL

## 2020-09-01 PROBLEM — S06.5XAA SUBDURAL HEMATOMA (H): Status: ACTIVE | Noted: 2020-09-01

## 2020-09-01 LAB
GLUCOSE BLDC GLUCOMTR-MCNC: 102 MG/DL (ref 70–99)
GLUCOSE BLDC GLUCOMTR-MCNC: 104 MG/DL (ref 70–99)
GLUCOSE BLDC GLUCOMTR-MCNC: 87 MG/DL (ref 70–99)
GLUCOSE BLDC GLUCOMTR-MCNC: 96 MG/DL (ref 70–99)
GLUCOSE BLDC GLUCOMTR-MCNC: 99 MG/DL (ref 70–99)

## 2020-09-01 PROCEDURE — 70450 CT HEAD/BRAIN W/O DYE: CPT | Mod: 77

## 2020-09-01 PROCEDURE — 40000141 ZZH STATISTIC PERIPHERAL IV START W/O US GUIDANCE

## 2020-09-01 PROCEDURE — 99207 ZZC NON-BILLABLE SERV PER CHARTING: CPT | Performed by: STUDENT IN AN ORGANIZED HEALTH CARE EDUCATION/TRAINING PROGRAM

## 2020-09-01 PROCEDURE — 40000257 ZZH STATISTIC CONSULT NO CHARGE VASC ACCESS

## 2020-09-01 PROCEDURE — 00000146 ZZHCL STATISTIC GLUCOSE BY METER IP

## 2020-09-01 PROCEDURE — 99231 SBSQ HOSP IP/OBS SF/LOW 25: CPT | Performed by: PHYSICIAN ASSISTANT

## 2020-09-01 PROCEDURE — 20000003 ZZH R&B ICU

## 2020-09-01 PROCEDURE — 25000128 H RX IP 250 OP 636: Performed by: EMERGENCY MEDICINE

## 2020-09-01 PROCEDURE — 70450 CT HEAD/BRAIN W/O DYE: CPT

## 2020-09-01 PROCEDURE — 25000128 H RX IP 250 OP 636: Performed by: STUDENT IN AN ORGANIZED HEALTH CARE EDUCATION/TRAINING PROGRAM

## 2020-09-01 PROCEDURE — 99222 1ST HOSP IP/OBS MODERATE 55: CPT | Performed by: PHYSICIAN ASSISTANT

## 2020-09-01 PROCEDURE — 96376 TX/PRO/DX INJ SAME DRUG ADON: CPT

## 2020-09-01 PROCEDURE — 25000125 ZZHC RX 250: Performed by: INTERNAL MEDICINE

## 2020-09-01 PROCEDURE — U0003 INFECTIOUS AGENT DETECTION BY NUCLEIC ACID (DNA OR RNA); SEVERE ACUTE RESPIRATORY SYNDROME CORONAVIRUS 2 (SARS-COV-2) (CORONAVIRUS DISEASE [COVID-19]), AMPLIFIED PROBE TECHNIQUE, MAKING USE OF HIGH THROUGHPUT TECHNOLOGIES AS DESCRIBED BY CMS-2020-01-R: HCPCS | Performed by: EMERGENCY MEDICINE

## 2020-09-01 PROCEDURE — HZ2ZZZZ DETOXIFICATION SERVICES FOR SUBSTANCE ABUSE TREATMENT: ICD-10-PCS | Performed by: INTERNAL MEDICINE

## 2020-09-01 PROCEDURE — 99223 1ST HOSP IP/OBS HIGH 75: CPT | Mod: AI | Performed by: INTERNAL MEDICINE

## 2020-09-01 PROCEDURE — 25000128 H RX IP 250 OP 636: Performed by: INTERNAL MEDICINE

## 2020-09-01 PROCEDURE — 25800030 ZZH RX IP 258 OP 636: Performed by: INTERNAL MEDICINE

## 2020-09-01 PROCEDURE — 25800030 ZZH RX IP 258 OP 636: Performed by: NURSE PRACTITIONER

## 2020-09-01 PROCEDURE — 25000125 ZZHC RX 250: Performed by: NURSE PRACTITIONER

## 2020-09-01 RX ORDER — AMLODIPINE BESYLATE 5 MG/1
5 TABLET ORAL DAILY
Status: ON HOLD | COMMUNITY
End: 2020-09-09

## 2020-09-01 RX ORDER — MULTIPLE VITAMINS W/ MINERALS TAB 9MG-400MCG
1 TAB ORAL DAILY
Status: DISCONTINUED | OUTPATIENT
Start: 2020-09-02 | End: 2020-09-09 | Stop reason: HOSPADM

## 2020-09-01 RX ORDER — LANOLIN ALCOHOL/MO/W.PET/CERES
100 CREAM (GRAM) TOPICAL DAILY
Status: DISPENSED | OUTPATIENT
Start: 2020-09-02 | End: 2020-09-06

## 2020-09-01 RX ORDER — BUPROPION HYDROCHLORIDE 150 MG/1
150 TABLET ORAL EVERY MORNING
COMMUNITY

## 2020-09-01 RX ORDER — DEXMEDETOMIDINE HYDROCHLORIDE 4 UG/ML
.2-1.2 INJECTION, SOLUTION INTRAVENOUS CONTINUOUS
Status: DISCONTINUED | OUTPATIENT
Start: 2020-09-01 | End: 2020-09-06

## 2020-09-01 RX ORDER — ONDANSETRON 2 MG/ML
4 INJECTION INTRAMUSCULAR; INTRAVENOUS EVERY 6 HOURS PRN
Status: DISCONTINUED | OUTPATIENT
Start: 2020-09-01 | End: 2020-09-09 | Stop reason: HOSPADM

## 2020-09-01 RX ORDER — LORAZEPAM 2 MG/ML
1 INJECTION INTRAMUSCULAR ONCE
Status: COMPLETED | OUTPATIENT
Start: 2020-09-01 | End: 2020-09-01

## 2020-09-01 RX ORDER — HYDRALAZINE HYDROCHLORIDE 20 MG/ML
10-20 INJECTION INTRAMUSCULAR; INTRAVENOUS
Status: DISCONTINUED | OUTPATIENT
Start: 2020-09-01 | End: 2020-09-09 | Stop reason: HOSPADM

## 2020-09-01 RX ORDER — NALOXONE HYDROCHLORIDE 0.4 MG/ML
.1-.4 INJECTION, SOLUTION INTRAMUSCULAR; INTRAVENOUS; SUBCUTANEOUS
Status: DISCONTINUED | OUTPATIENT
Start: 2020-09-01 | End: 2020-09-09 | Stop reason: HOSPADM

## 2020-09-01 RX ORDER — ONDANSETRON 4 MG/1
4 TABLET, ORALLY DISINTEGRATING ORAL EVERY 6 HOURS PRN
Status: DISCONTINUED | OUTPATIENT
Start: 2020-09-01 | End: 2020-09-09 | Stop reason: HOSPADM

## 2020-09-01 RX ORDER — SODIUM CHLORIDE, SODIUM LACTATE, POTASSIUM CHLORIDE, CALCIUM CHLORIDE 600; 310; 30; 20 MG/100ML; MG/100ML; MG/100ML; MG/100ML
INJECTION, SOLUTION INTRAVENOUS CONTINUOUS
Status: DISCONTINUED | OUTPATIENT
Start: 2020-09-01 | End: 2020-09-05

## 2020-09-01 RX ORDER — ACETAMINOPHEN 325 MG/1
650 TABLET ORAL EVERY 4 HOURS PRN
COMMUNITY

## 2020-09-01 RX ORDER — LOPERAMIDE HCL 2 MG
2 CAPSULE ORAL
COMMUNITY

## 2020-09-01 RX ORDER — LORAZEPAM 2 MG/ML
1-2 INJECTION INTRAMUSCULAR EVERY 30 MIN PRN
Status: DISCONTINUED | OUTPATIENT
Start: 2020-09-01 | End: 2020-09-09 | Stop reason: HOSPADM

## 2020-09-01 RX ORDER — VENLAFAXINE HYDROCHLORIDE 150 MG/1
300 CAPSULE, EXTENDED RELEASE ORAL DAILY
COMMUNITY

## 2020-09-01 RX ORDER — HALOPERIDOL 5 MG/ML
5 INJECTION INTRAMUSCULAR EVERY 6 HOURS PRN
Status: DISCONTINUED | OUTPATIENT
Start: 2020-09-01 | End: 2020-09-06

## 2020-09-01 RX ORDER — METOPROLOL SUCCINATE 100 MG/1
100 TABLET, EXTENDED RELEASE ORAL DAILY
COMMUNITY

## 2020-09-01 RX ORDER — QUETIAPINE FUMARATE 25 MG/1
25 TABLET, FILM COATED ORAL 3 TIMES DAILY PRN
COMMUNITY

## 2020-09-01 RX ORDER — LABETALOL HYDROCHLORIDE 5 MG/ML
10-20 INJECTION, SOLUTION INTRAVENOUS EVERY 10 MIN PRN
Status: DISCONTINUED | OUTPATIENT
Start: 2020-09-01 | End: 2020-09-09 | Stop reason: HOSPADM

## 2020-09-01 RX ORDER — LORAZEPAM 1 MG/1
1-2 TABLET ORAL EVERY 30 MIN PRN
Status: DISCONTINUED | OUTPATIENT
Start: 2020-09-01 | End: 2020-09-09 | Stop reason: HOSPADM

## 2020-09-01 RX ORDER — PRAVASTATIN SODIUM 40 MG
40 TABLET ORAL AT BEDTIME
COMMUNITY

## 2020-09-01 RX ORDER — LEVETIRACETAM 750 MG/1
750 TABLET, FILM COATED, EXTENDED RELEASE ORAL AT BEDTIME
COMMUNITY

## 2020-09-01 RX ORDER — FOLIC ACID 1 MG/1
1 TABLET ORAL DAILY
Status: DISCONTINUED | OUTPATIENT
Start: 2020-09-02 | End: 2020-09-09 | Stop reason: HOSPADM

## 2020-09-01 RX ADMIN — DEXMEDETOMIDINE HYDROCHLORIDE 0.7 MCG/KG/HR: 100 INJECTION, SOLUTION, CONCENTRATE INTRAVENOUS at 19:57

## 2020-09-01 RX ADMIN — LORAZEPAM 1 MG: 2 INJECTION INTRAMUSCULAR; INTRAVENOUS at 01:44

## 2020-09-01 RX ADMIN — LORAZEPAM 2 MG: 2 INJECTION INTRAMUSCULAR; INTRAVENOUS at 07:53

## 2020-09-01 RX ADMIN — VALPROATE SODIUM 500 MG: 100 INJECTION, SOLUTION INTRAVENOUS at 10:49

## 2020-09-01 RX ADMIN — LORAZEPAM 2 MG: 2 INJECTION INTRAMUSCULAR; INTRAVENOUS at 08:37

## 2020-09-01 RX ADMIN — FOLIC ACID: 5 INJECTION, SOLUTION INTRAMUSCULAR; INTRAVENOUS; SUBCUTANEOUS at 09:02

## 2020-09-01 RX ADMIN — LORAZEPAM 2 MG: 2 INJECTION INTRAMUSCULAR; INTRAVENOUS at 15:40

## 2020-09-01 RX ADMIN — SODIUM CHLORIDE, POTASSIUM CHLORIDE, SODIUM LACTATE AND CALCIUM CHLORIDE: 600; 310; 30; 20 INJECTION, SOLUTION INTRAVENOUS at 21:44

## 2020-09-01 RX ADMIN — HALOPERIDOL LACTATE 5 MG: 5 INJECTION, SOLUTION INTRAMUSCULAR at 08:41

## 2020-09-01 RX ADMIN — SODIUM CHLORIDE, POTASSIUM CHLORIDE, SODIUM LACTATE AND CALCIUM CHLORIDE: 600; 310; 30; 20 INJECTION, SOLUTION INTRAVENOUS at 07:12

## 2020-09-01 RX ADMIN — HALOPERIDOL LACTATE 5 MG: 5 INJECTION, SOLUTION INTRAMUSCULAR at 15:38

## 2020-09-01 RX ADMIN — DEXMEDETOMIDINE HYDROCHLORIDE 0.2 MCG/KG/HR: 100 INJECTION, SOLUTION, CONCENTRATE INTRAVENOUS at 08:56

## 2020-09-01 RX ADMIN — VALPROATE SODIUM 500 MG: 100 INJECTION, SOLUTION INTRAVENOUS at 16:36

## 2020-09-01 RX ADMIN — LORAZEPAM 2 MG: 2 INJECTION INTRAMUSCULAR; INTRAVENOUS at 15:38

## 2020-09-01 ASSESSMENT — ACTIVITIES OF DAILY LIVING (ADL)
ADLS_ACUITY_SCORE: 14
AMBULATION: 0-->INDEPENDENT
BATHING: 0-->INDEPENDENT
TRANSFERRING: 0-->INDEPENDENT
PRIOR_FUNCTIONAL_LEVEL_COMMENT: INDEPENDENT
ADLS_ACUITY_SCORE: 11
ADLS_ACUITY_SCORE: 22
TOILETING: 0-->INDEPENDENT
RETIRED_COMMUNICATION: 0-->UNDERSTANDS/COMMUNICATES WITHOUT DIFFICULTY
WHICH_OF_THE_ABOVE_FUNCTIONAL_RISKS_HAD_A_RECENT_ONSET_OR_CHANGE?: AMBULATION;TRANSFERRING;TOILETING;BATHING;DRESSING;EATING;SWALLOWING;COGNITION;COMMUNICATION/SPEECH;FALL HISTORY
RETIRED_EATING: 0-->INDEPENDENT
DRESS: 0-->INDEPENDENT
FALL_HISTORY_WITHIN_LAST_SIX_MONTHS: YES
COGNITION: 0 - NO COGNITION ISSUES REPORTED
ADLS_ACUITY_SCORE: 22
SWALLOWING: 0-->SWALLOWS FOODS/LIQUIDS WITHOUT DIFFICULTY

## 2020-09-01 ASSESSMENT — MIFFLIN-ST. JEOR: SCORE: 1446.63

## 2020-09-01 NOTE — PROGRESS NOTES
Brief note:     Discussed admission to ICU with hospitalist for acute and subacute/chronic b/l subdural hematomas with frequent neuro checks. He will also be treated for EtOH withdrawal. Neurosurgery consulted and hospitalist team primary.     Bety Goldberg MD  Pulmonary, Allergy, Critical Care, and Sleep Medicine   [facility]   Pager: [phone]

## 2020-09-01 NOTE — ED TRIAGE NOTES
Pt. brought from home with nausea and vomiting today. Concurent dizzy feeling. Given zofran 4mg IVP by paramedics. Denies COVID symptoms. Pt. admits to drinking 1/2 liter of Vodka daily, with last drinkbeing yesterday. +visable tremor.

## 2020-09-01 NOTE — PROGRESS NOTES
Patient relatively calm at change of shift (7 AM), trying to get out of bed and confused however redirectable. Within the next hour patient became increasingly more agitated, confused without the ability to be redirected, restless and aggressive, trying to get out of bed, with severe auditory and visual hallucinations. Hospitalist notified, PRN Haldol prescribed. Patient not responding to PRN Lorazepam, Precedex ordered by ICU NP. Eventually fell asleep, with breakthrough episodes of agitation and restlessness, however able to fall asleep.    12:45 PM Neurosurgery at bedside at 12:45; New CT scan reviewed; No changes in care plan from Neurosurgery side.     12:31 PM Patient bradycardic (HR 55-60); Precedex stopped since patient is currently calm and comfortable.    14:00 PM Precedex restarted due to new agitation.

## 2020-09-01 NOTE — ED PROVIDER NOTES
Pt found to have bilateral subdural hematoma with both acute and subacute/chronic findings.  Spoke with NSG and hospitalist.  Pt admitted to ICU for close monitoring for subdural along with possible EtOH withdrawal symptoms.     Silvino Villalta MD  09/05/20 0092

## 2020-09-01 NOTE — PROGRESS NOTES
[facility]    Medicine Progress Note - Hospitalist Service     Date of Admission:  8/31/2020  Date of Service: 09/01/2020    Assessment & Plan The email address for your [facility] account has been updated     Jong Galarza is a 70 year old male with a history of HTN, HLP, anxiety and depression who presented to the ED on 8/31/2020 with concerns for alcohol withdrawal.  Work up in the ED though showed bilateral subdural hematomas with a 3 mm shift     Delirium Tremens  Alcohol abuse with concerns for withdrawal   Patient supposedly drinks 0.5 L of vodka daily with his last drink a day prior to arrival in the ED.  Has been tremulous in the ED with N/V and dizziness recently.  Did require Ativan x2 in the ED for withdrawal symptoms. Still had DTs despite CIWA protocol, precedex started by critical care service with improvement in agitation.   Plan:  - Continue Precedex infusion   - Valproic acid 500 mg q 8 hours. Recommend 3 day course per Critical care team  - CIWA protocol with PRN Ativan for now   - IV followed by PO multivitamins   - Will likely need CD or psychiatry evaluation once hematomas stable     Bilateral subdural hematomas   While in the ED the patient reported recent falls with the most recent one likely yesterday.  CT scan of the head was done and showed bilateral subdural hematomas with a 3 mm left to right midline shift.  Also there was a probable tiny amount of acute subdural hematoma on the right tentorium.  Neurosurgery was made aware in the ED and recommended admission to the ICU for further monitoring. Repeat CT head shows Stable hypodense subdural collections along the cerebral convexities on both sides with interval dissipation of the tiny focus of hyperdense hemorrhage noted along the lateral left temporal lobe since the comparison study.  Plan:  - Keep in CU  - Q4h neuro-checks  - Neurosurgery consulted and appreciate their recommendations -> no further intervention.      HTN   HLP   - Will resume PTA medications once improvement in alcohol withdrawal.     Anxiety/Depression   - Will resume PTA medications once confirmed by pharmacy and withdrawls improved           Diet: NPO for Medical/Clinical Reasons Except for: Meds    DVT Prophylaxis: Pneumatic Compression Devices  Forman Catheter: not present  Code Status: Full Code           Disposition Plan   Expected discharge: 2 - 3 days, recommended to transitional care unit once mental status at baseline.  Entered: Dave Sanchez MD 09/01/2020, 6:37 PM       The patient's care was discussed with the Bedside Nurse and Patient.    Dave Sanchez MD  Hospitalist Service  [facility]      Patient, family, interdisciplinary team involved in care and agrees with plan.  Total time - Greater than 35 min. More than 50% of time spent in direct patient care, care coordination, patient/caregiver counseling, and formalizing plan of care.     ______________________________________________________________________    Interval History     Very agitated this AM despite ativan and Haldol, precedex started with improvement    Data reviewed today: I reviewed all medications, new labs and imaging results over the last 24 hours. I personally reviewed no images or EKG's today.    Physical Exam   Vital Signs: Temp: 97.8  F (36.6  C) Temp src: Axillary BP: 119/71 Pulse: 53   Resp: 16 SpO2: 96 % O2 Device: Nasal cannula with humidification Oxygen Delivery: 4 LPM  Weight: 159 lbs 6.28 oz    Constitutional: somnolent from precedex infusion. no apparent distress  Respiratory: CTABL   Cardiovascular: RRR with no m/r/g   GI: Normal bowel sounds, soft, non-distended, non-tender.   Skin: normal skin color, texture, turgor   Musculoskeletal: There is no redness, warmth, or swelling of the joints.   Neurologic: limited due to sedation, but moving all extremities.   Neuropsychiatric: normal mood and affect      Data   Recent Labs   Lab 08/31/20  1944   WBC  11.6*   HGB 13.9   MCV 97   *      POTASSIUM 3.4   CHLORIDE 103   CO2 17*   BUN 54*   CR 1.18   ANIONGAP 20*   BLANCA 9.2   *   ALBUMIN 3.8   PROTTOTAL 6.8   BILITOTAL 2.2*   ALKPHOS 129   *   *     Recent Results (from the past 24 hour(s))   Head CT w/o contrast    Narrative    EXAM: CT HEAD W/O CONTRAST  LOCATION: [facility]  DATE/TIME: 9/1/2020 2:57 AM    INDICATION: Head injury  COMPARISON: None.  TECHNIQUE: Routine without IV contrast. Multiplanar reformats. Dose reduction techniques were used.    FINDINGS:  INTRACRANIAL CONTENTS: Bilateral subdural hematoma overlying cerebral hemispheres laterally. These are predominantly low density however left-sided subdural hematoma contains hyperdense acute component. 3 mm left-to-right midline shift. Probable tiny   amount of acute subdural hematoma layering on the right tentorium. No CT evidence of acute infarct. Mild presumed chronic small vessel ischemic changes. Mild generalized volume loss. No hydrocephalus.     VISUALIZED ORBITS/SINUSES/MASTOIDS: No intraorbital abnormality. Mild mucosal thickening left maxillary sinus. No middle ear or mastoid effusion.    BONES/SOFT TISSUES: No acute abnormality.      Impression    IMPRESSION:  1.  Bilateral subdural hematoma overlying cerebral hemispheres laterally. These are predominantly low density however left-sided subdural hematoma contains hyperdense acute component.  2.  Probable tiny amount of acute subdural hematoma layering on the right tentorium.  3.  3 mm left-to-right midline shift.    Results were called to Dr. MOLINA at 9/1/2020 3:11 AM.   CT Head w/o Contrast    Narrative    CT OF THE HEAD WITHOUT CONTRAST 9/1/2020 11:52 AM     COMPARISON: Head CT 9/1/2020 at 0241 hours.    HISTORY: Intracranial hemorrhage, known, follow-up.    TECHNIQUE: 5 mm thick axial CT images of the head were acquired  without IV contrast material.    FINDINGS: Thin hypodense subdural  collections along the cerebral  convexities bilaterally are again noted; the focus of hyperdense  extra-axial fluid along the lateral left temporal convexity is no  longer visualized and may have dissipated into the more predominant  surrounding hypodense subdural collection. 4-5 mm rightward midline  shift of the septum pellucidum persists without change.    There is new minimal intraventricular hemorrhage layering in the  occipital horns of the lateral ventricles on both sides. There is no  other evidence for new or recent intracranial hemorrhage.    There is moderate diffuse cerebral volume loss. There are subtle  patchy areas of decreased density in the cerebral white matter  bilaterally that are consistent with sequela of chronic small vessel  ischemic disease. The ventricles and basal cisterns are within normal  limits in configuration given the degree of cerebral volume loss.     No intracranial mass or recent infarct.    The visualized paranasal sinuses are well-aerated. There is no  mastoiditis. There are no fractures of the visualized bones.       Impression    IMPRESSION:   1. Stable hypodense subdural collections along the cerebral  convexities on both sides with interval dissipation of the tiny focus  of hyperdense hemorrhage noted along the lateral left temporal lobe  since the comparison study.  2. Interval development of tiny foci of intraventricular hemorrhage  layering in the occipital horns of the lateral ventricles on both  sides.   3. Stable 4-5 mm rightward midline shift of the septum pellucidum.  4. Diffuse cerebral volume loss and cerebral white matter changes  consistent with chronic small vessel ischemic disease.       Radiation dose for this scan was reduced using automated exposure  control, adjustment of the mA and/or kV according to patient size, or  iterative reconstruction technique.    BOB SPANN MD     Medications     dexmedetomidine 0.6 mcg/kg/hr (09/01/20 6837)     lactated  ringers 75 mL/hr at 09/01/20 1200       [START ON 9/2/2020] folic acid  1 mg Oral Daily     [START ON 9/2/2020] multivitamin w/minerals  1 tablet Oral Daily     [START ON 9/2/2020] thiamine  100 mg Oral Daily     valproate (DEPACON) intermittent infusion  500 mg Intravenous Q8H

## 2020-09-01 NOTE — ED NOTES
Bed: [facility]  Expected date: 8/31/20  Expected time: 7:01 PM  Means of arrival: Ambulance  Comments:  423 69m withdrawals ETA 1908

## 2020-09-01 NOTE — CONSULTS
Consult Date:  09/01/2020        ASSESSMENT:  A 70-year-old male, presenting with alcohol withdrawal of the fall on Saturday with imaging findings revealing small right chronic subdural hematoma, small left acute on chronic subdural hematoma.      PLAN:  From the neurosurgical standpoint, he will be admitted to the ICU for observation.  Repeat a head CT at noon today.  If stable, will be able to be discharged from the neurosurgical standpoint with outpatient followup.  Alcohol withdrawal per Medicine.    TYPE OF VISIT:  The Neurosurgical Service was consulted to see Mr. Snow for subdural hematoma.      HISTORY OF PRESENT ILLNESS:  Mr. Snow is a 70-year-old right-handed male with a past medical history of alcoholism, hypertension, depression and anxiety who presented to the emergency room last evening for nausea and vomiting.  Mr. Snow states that he has been drinking a half a liter to a liter of vodka per day for the past 10 years.  He did not have any alcohol yesterday and his daughter called EMS when he started developing nausea and vomiting, which was thought to be alcohol withdrawal.  In the emergency room when he continued to be somewhat altered, head CT was obtained and revealed a small acute on chronic subdural hematoma, so Neurosurgery was consulted.  Currently, Mr. Snow denies any complaints including headache, weakness, nausea, or vomiting.  He states that he did have a fall on Saturday, hitting his head while intoxicated.      PAST MEDICAL HISTORY:  Alcoholism, hypertension, hyperlipidemia, anxiety.      PAST SURGICAL HISTORY:  Left knee repair, hernia repair and prostatectomy.      SOCIAL HISTORY:  He states he drinks a half to a liter of vodka a day for the past 10 years.  Currently living with his daughter, but states that she will be going back to school in [place] soon.  He is right-handed.      MEDICATIONS:  Reviewed per the electronic medical record, not including any  anticoagulation.      ALLERGIES:  SULFA.      PHYSICAL EXAMINATION:   VITAL SIGNS:  Blood pressure 139/79, pulse is 60, respiratory rate is 20.   GENERAL:  He is awake.  He is alert and oriented to person.  He answers questions appropriately.  Restraints were removed and he was able to move all 4 extremities well.  He has a visible tremor noted, but negative pronator drift and otherwise her strength is intact.  Dariela coma scale is 15.      IMAGING STUDIES:  In the emergency room,  he had a head CT which shows a small acute on chronic left subdural hematoma, approximately 1 mm, and also an approximately 1 mm right chronic subdural hematoma.  Radiology mentions 3 mm of left to right shift.  Tiny amount of subdural layering in the right tentorium.      LABORATORY DATA:  Sodium 140, white count 11.6, platelets 122.      Total time spent with the patient 60 minutes, including 40 minutes of counseling and coordination of care.      Dr. Blanca aware following morning.       As dictated by ROSE KAUR            D: 2020   T: 2020   MT: ADRIANA      Name:     FABIO BERKOWITZ   MRN:      [id number]        Account:       [id number]   :      1950           Consult Date:  2020      Document: [id number]

## 2020-09-01 NOTE — PROGRESS NOTES
ICU Multi-Disciplinary Note  Jong Galarza is a 70 year old male with a history of HTN, HLP, anxiety and depression who presented to the ED on 8/31/2020 with concerns for alcohol withdrawal.  Work up in the ED though showed bilateral subdural hematomas with a 3 mm shift    Noted increasing agitation and aggression this AM\  -Add Precedex infusion   -Add Valproic acid 500 mg q 8 hours. Recommend 3 day course   The Critical Care service will continue to follow peripherally while the patient is within the ICU. We are readily available should issues arise. Please feel free to contact us for critical care issues with which we may be of assistance. For all other concerns, please contact primary service first.     Davis Clark NP

## 2020-09-01 NOTE — PLAN OF CARE
OT/PT: per discussion with RN during ICU rds, patient not appropriate for therapy today. Will reschedule for tomorrow- 09/02/2020.

## 2020-09-01 NOTE — ED NOTES
"I told pt we were looking for a detox bed for him, and he replied \"I'm not interested in detox\".  "

## 2020-09-01 NOTE — PROGRESS NOTES
Patient received from ED 0630. Wrist restraints in place. Alert to self & place. Multiple attempts to get out of bed, redirectable. NORA CHILDS. 1 PIV, fluids started. NPO status. Report given to the oncoming nurseQuinten.

## 2020-09-01 NOTE — PROGRESS NOTES
"Neurosurgery Progress     CT OF THE HEAD WITHOUT CONTRAST 9/1/2020 11:52 AM   1. Stable hypodense subdural collections along the cerebral  convexities on both sides with interval dissipation of the tiny focus  of hyperdense hemorrhage noted along the lateral left temporal lobe  since the comparison study.  2. Interval development of tiny foci of intraventricular hemorrhage  layering in the occipital horns of the lateral ventricles on both  sides.   3. Stable 4-5 mm rightward midline shift of the septum pellucidum.  4. Diffuse cerebral volume loss and cerebral white matter changes  consistent with chronic small vessel ischemic disease.      Neuro stable.     TODAY'S PLAN:     We have no change in our plan at this time. Should work with therapy when able. Will certainly continue to follow along.     In the event that patient's symptoms worsen or change we would appreciate being contacted.     /66   Pulse 63   Temp 97.8  F (36.6  C) (Axillary)   Resp 20   Ht 1.702 m (5' 7\")   Wt 72.3 kg (159 lb 6.3 oz)   SpO2 95%   BMI 24.96 kg/m       Pt in bed. Appears comfortable and in no apparent distress, moving all extremities.   Head: Normocephalic, without obvious abnormality, atraumatic, no facial asymmetry.   Eyes: conjunctivae/corneas clear. PERRL, EOM's intact.   Throat: lips, mucosa, and tongue normal; teeth and gums normal.   Neck: supple, symmetrical, trachea midline, no adenopathy and thyroid: not enlarged, symmetric, no tenderness/mass/nodules.   Lungs: clear to auscultation bilaterally.   Heart: regular rate and rhythm.   Abdomen: soft, non-tender; bowel sounds normal; no masses, no organomegaly.   Pulses: 2+ and symmetric.   Skin: Skin color, texture, turgor normal. No rashes or lesions.   Right weaker than left.   CN II-XII intact, alert but not interactive. Intermittently following commands.   Sensation intact throughout. Acceptable ROM.   Calves soft and non-tender bilaterally.     All pertinent labs " and updated imaging reviewed in EPIC.     Robel Rosen PA-C   Neurosurgery   [phone] (P)     Reviewed with Dr. Blanca.

## 2020-09-01 NOTE — H&P
[facility]    History and Physical - Hospitalist Service       Date of Admission:  8/31/2020    Assessment & Plan   Jong Galarza is a 70 year old male with a history of HTN, HLP, anxiety and depression who presented to the ED on 8/31/2020 with concerns for alcohol withdrawal.  Work up in the ED though showed bilateral subdural hematomas with a 3 mm shift     Bilateral subdural hematomas   While in the ED the patient reported recent falls with the most recent one likely yesterday.  CT scan of the head was done and showed bilateral subdural hematomas with a 3 mm left to right midline shift.  Also there was a probable tiny amount of acute subdural hematoma on the right tentorium.  Neurosurgery was made aware in the ED and recommended admission to the ICU for further monitoring  - Admission to ICU   - Q1h neuro-checks  - Neurosurgery consulted and appreciate their recommendations     Alcohol abuse with concerns for withdrawal   Patient supposedly drinks 0.5 L of vodka daily with his last drink a day prior to arrival in the ED.  Has been tremulous in the ED with N/V and dizziness recently.  Did require Ativan x2 in the ED for withdrawal symptoms  - CIWA protocol with PRN Ativan for now   - IV followed by PO multivitamins   - Will likely need CD or psychiatry evaluation once hematomas stable     HTN   HLP   - Will resume PTA medications once confirmed by pharmacy     Anxiety/Depression   - Will resume PTA medications once confirmed by pharmacy          Diet: NPO until seen by neurosurgery  DVT Prophylaxis: Pneumatic Compression Devices  Forman Catheter: not present  Code Status: Unable to discuss with patient.  Will be full code until can discuss further          Disposition Plan   Expected discharge: TBD, once   Entered: Bakari Snyder DO 09/01/2020, 5:03 AM     The patient's care was discussed with the Patient and ED provider.    Bakari Snyder DO  [facility]  Hospital    ______________________________________________________________________    Chief Complaint   Alcohol withdrawal     History is limited due to the patient's recent alcohol intoxication    History of Present Illness   Jong Galarza is a 70 year old male with a history of depression/anxiety, HTN and HLP who initially presented with concerns for alcohol withdrawal.  He drinks 0.5 L of vodka a day with his last drink a day prior.  He is now having issues with nausea, vomiting and dizziness prompting him to come in to the ED.  Patient has a history of alcohol withdrawal symptoms similar to this.  He denies any fevers, chills, cough, sore throat, chest pain, SOB, abdominal pain.     Of note, the initial plan was for transfer to detox but patient then admitted to some falls recently with his last one a day prior.  CT scan of the head was then obtained and showed the subdural hematomas as above      Review of Systems    Review of systems not obtained due to patient factors - recent intoxication    Past Medical History    I have reviewed this patient's medical history and updated it with pertinent information if needed.   Past Medical History:   Diagnosis Date     Anxiety      GERD (gastroesophageal reflux disease)      Hyperlipidemia      Hypertension      Major depression        Past Surgical History   I have reviewed this patient's surgical history and updated it with pertinent information if needed.  Past Surgical History:   Procedure Laterality Date     C REPAIR CRUCIATE LIGAMENT,KNEE Left 1985     LAPAROSCOPIC HERNIORRHAPHY INGUINAL BILATERAL       PROSTATECTOMY PERINEAL  2006       Social History   I have reviewed this patient's social history and updated it with pertinent information if needed.  Social History     Tobacco Use     Smoking status: Not on file   Substance Use Topics     Alcohol use: Not on file     Drug use: Not on file       Family History   Denied significant family history    Prior to  Admission Medications   Prior to Admission Medications   Prescriptions Last Dose Informant Patient Reported? Taking?   LORazepam (ATIVAN PO)  Pharmacy Yes No   Sig: Take 1 mg by mouth 2 times daily as needed for anxiety   METOPROLOL SUCCINATE ER PO  Pharmacy Yes No   Sig: Take 100 mg by mouth daily   QUETIAPINE FUMARATE PO  Pharmacy Yes No   Sig: Take 25 mg by mouth 3 times daily as needed   RABEprazole (ACIPHEX) 20 MG EC tablet   No No   Sig: Take 1 tablet (20 mg) by mouth daily   SIMVASTATIN PO  Pharmacy Yes No   Sig: Take 40 mg by mouth At Bedtime   TRAZODONE HCL PO  Pharmacy Yes No   Sig: Take 100-300 mg by mouth nightly as needed for sleep   acetaminophen (TYLENOL) 500 MG tablet   No No   Sig: Take 2 tablets (1,000 mg) by mouth 3 times daily as needed for mild pain or fever   lidocaine (LIDODERM) 5 % Patch   No No   Sig: Place 1 patch onto the skin every 24 hours   menthol (ICY HOT) 5 % PTCH   No No   Sig: Apply 1 patch topically every 8 hours as needed for muscle soreness   multivitamin, therapeutic (THERA-VIT) TABS tablet   No No   Sig: Take 1 tablet by mouth daily   naproxen (NAPROSYN) 500 MG tablet   No No   Sig: Take 1 tablet (500 mg) by mouth 2 times daily (with meals)   oxyCODONE (ROXICODONE) 5 MG IR tablet   No No   Sig: Take 1 tablet (5 mg) by mouth every 6 hours as needed for moderate to severe pain   venlafaxine (EFFEXOR-XR) 150 MG 24 hr capsule  Pharmacy Yes No   Sig: Take 300 mg by mouth daily      Facility-Administered Medications: None     Allergies   Allergies   Allergen Reactions     Sulfa Drugs        Physical Exam   Vital Signs:     BP: 139/79 Pulse: 60   Resp: 20 SpO2: 94 % O2 Device: None (Room air)    Weight: 0 lbs 0 oz    General Appearance: Resting comfortably. NAD  Eyes: EOMI.  Normal conjuctiva  HEENT: NC/AT.  Dry mucous membranes  Respiratory: Clear to auscultation.  No respiratory distress   Cardiovascular: RRR.  No obvious murmurs  GI: Bowel sounds present.  Mildly distended.  Non-tender  Skin: No obvious rashes.  No cyanosis  Musculoskeletal: No edema.  No calf tenderness   Neurologic: Moving all 4 extremities grossly.  CN appear intact.  Slurred speech intermittently but this may be due to intoxication and benzos given for withdrawal  Psychiatric: Alert.  Confuse     Data   Data reviewed today: I reviewed all medications, new labs and imaging results over the last 24 hours. I personally reviewed   CT Head:  Bilateral subdural hematomas noted     Recent Labs   Lab 08/31/20  1944   WBC 11.6*   HGB 13.9   MCV 97   *      POTASSIUM 3.4   CHLORIDE 103   CO2 17*   BUN 54*   CR 1.18   ANIONGAP 20*   BLANCA 9.2   *   ALBUMIN 3.8   PROTTOTAL 6.8   BILITOTAL 2.2*   ALKPHOS 129   *   *     Recent Results (from the past 24 hour(s))   Head CT w/o contrast    Narrative    EXAM: CT HEAD W/O CONTRAST  LOCATION: [facility]  DATE/TIME: 9/1/2020 2:57 AM    INDICATION: Head injury  COMPARISON: None.  TECHNIQUE: Routine without IV contrast. Multiplanar reformats. Dose reduction techniques were used.    FINDINGS:  INTRACRANIAL CONTENTS: Bilateral subdural hematoma overlying cerebral hemispheres laterally. These are predominantly low density however left-sided subdural hematoma contains hyperdense acute component. 3 mm left-to-right midline shift. Probable tiny   amount of acute subdural hematoma layering on the right tentorium. No CT evidence of acute infarct. Mild presumed chronic small vessel ischemic changes. Mild generalized volume loss. No hydrocephalus.     VISUALIZED ORBITS/SINUSES/MASTOIDS: No intraorbital abnormality. Mild mucosal thickening left maxillary sinus. No middle ear or mastoid effusion.    BONES/SOFT TISSUES: No acute abnormality.      Impression    IMPRESSION:  1.  Bilateral subdural hematoma overlying cerebral hemispheres laterally. These are predominantly low density however left-sided subdural hematoma contains hyperdense acute  component.  2.  Probable tiny amount of acute subdural hematoma layering on the right tentorium.  3.  3 mm left-to-right midline shift.    Results were called to Dr. MOLINA at 9/1/2020 3:11 AM.

## 2020-09-01 NOTE — ED NOTES
Pt attempting to get out of bed x2 - redirectable both times. Pt is alert, confused, but following commands. Will continue to monitor.

## 2020-09-01 NOTE — ED PROVIDER NOTES
History     Chief Complaint:  Alcohol withdrawal       HPI   Jong Galarza is a 70 year old male who presents with alcohol withdrawal. The patient denies drinking today but admits to drinking yesterday and approximately drinking 1/2 liter of Vodka daily. The patient complains of nausea and vomiting as well as dizziness. This feels similar to prior episodes of alcohol withdrawal. The patient denies cough, shortness of breath, fever, etc.     Allergies:  Sulfa drugs      Medications:    Tylenol  lidocain   Ativan  Menthol  motoprolol  Naprosyn  Oxycodone  aciphex  Simvastatin  Trazodone  venlafaxine    Past Surgical History:    c repair knee  Herniorrhaphy  prostatectomy    Family History:    History reviewed. No pertinent family history.     Social History:  Alcohol: yes  Marital Status:   [2]       Review of Systems   Constitutional: Positive for fever.   Respiratory: Negative for cough and shortness of breath.    Cardiovascular: Negative for chest pain.   Gastrointestinal: Positive for nausea and vomiting.   Neurological: Positive for dizziness.   10 systems reviewed and negative except as above and in HPI.    Physical Exam     Patient Vitals for the past 24 hrs:   BP Pulse Resp SpO2   08/31/20 2145 (!) 137/92 99 20 97 %   08/31/20 1930 (!) 144/84 112 17 95 %   08/31/20 1921 135/87 115 18 92 %       Physical Exam  General: Resting on the gurney, appears uncomfortable, tremoulous  Head:  The scalp, face, and head appear normal  Mouth/Throat: Mucus membranes are moist  CV:  Rapid but regular rate    Normal S1 and S2  No pathological murmur   Resp:  Breath sounds clear and equal bilaterally    Non-labored, no retractions or accessory muscle use    No coarseness    No wheezing   GI:  Abdomen is soft, no guarding, no rebound, no rigidity    No tenderness to palpation  MS:  Normal motor assessment of all extremities.    Good capillary refill noted.      Skin:   No rash or lesions noted.  Neuro:  Tremulous.  Speech is normal and fluent. No apparent deficit. Denies thoughts of hurting himself or other.   Psych: Awake. Alert.  Normal affect.      Appropriate interactions.      Emergency Department Course     Laboratory:  Laboratory findings were communicated with the patient who voiced understanding of the findings.    CBC: WBC 11.6 (H),  (L) o/w WNL. ( HGB 3.9)     CMP: Carbon dioxide 17 (L), Anion Gap 20 (H), Glucose 116 (H), BUN 54 (H), Alt 146 (H),  (H), Bilirubin 2.2 (H) o/w WNL (Creatinine: 1.18)    Interventions:  1943 Lorazepam, 1 mg, IV injection  2006 Sodium chloride 0.9 % IV  2153 Lorazepam, 2 mg, IV injection      Emergency Department Course:  Past medical records, nursing notes, and vitals reviewed.    1935 I performed an exam of the patient as documented above.     IV was inserted and blood was drawn for laboratory testing, results above.    0051 I rechecked the patient and discussed the results of his workup thus far.     I personally reviewed the laboratory results with the Patient and answered all related questions prior to detox     Impression & Plan       Medical Decision Making:  Jong Galarza is a 70 year old male who presents for evaluation of nausea and vomition. He appears on exam to be going through acute alcohol withdrawal.  Patient was treated symptomatically in the ED with IV fluids, and ativan.  Blood work was obtained and is reassuring; no signs of alcoholic ketoacidosis, pancreatitis, significant liver impairment or acute alcoholic hepatitis. He has no signs of acute trauma related to alcohol use and no further workup is needed including head CT.     Plan to discharge to detox for further cares given history, tachycardia and withdrawal here.  Pending bed availability      Diagnosis:  Alcohol withdrawal    Disposition:  Detox vs discharge    Scribe Disclosure:  MAN, Anatoliy Moore, am serving as a scribe at 7:35 PM on 8/31/2020 to document services personally performed by  Lawanda Jones MD based on my observations and the provider's statements to me.        Lawanda Jones MD  09/01/20 0142

## 2020-09-01 NOTE — ED NOTES
Pt was trying to get out of bed, so I went into the room to see what he needed help with. Pt stated that he has to use the restroom,so I helped him use the commode in the room. Pt was unable to stand on his own without assistance. Pt was able to stand and pivot to use the commode with assistance.

## 2020-09-02 LAB
ALBUMIN SERPL-MCNC: 3.1 G/DL (ref 3.4–5)
ALP SERPL-CCNC: 93 U/L (ref 40–150)
ALT SERPL W P-5'-P-CCNC: 87 U/L (ref 0–70)
ANION GAP SERPL CALCULATED.3IONS-SCNC: 7 MMOL/L (ref 3–14)
AST SERPL W P-5'-P-CCNC: 100 U/L (ref 0–45)
BILIRUB DIRECT SERPL-MCNC: 0.5 MG/DL (ref 0–0.2)
BILIRUB SERPL-MCNC: 1.4 MG/DL (ref 0.2–1.3)
BUN SERPL-MCNC: 17 MG/DL (ref 7–30)
CALCIUM SERPL-MCNC: 8.3 MG/DL (ref 8.5–10.1)
CHLORIDE SERPL-SCNC: 103 MMOL/L (ref 94–109)
CO2 SERPL-SCNC: 31 MMOL/L (ref 20–32)
CREAT SERPL-MCNC: 0.66 MG/DL (ref 0.66–1.25)
ERYTHROCYTE [DISTWIDTH] IN BLOOD BY AUTOMATED COUNT: 12.8 % (ref 10–15)
GFR SERPL CREATININE-BSD FRML MDRD: >90 ML/MIN/{1.73_M2}
GLUCOSE BLDC GLUCOMTR-MCNC: 100 MG/DL (ref 70–99)
GLUCOSE BLDC GLUCOMTR-MCNC: 109 MG/DL (ref 70–99)
GLUCOSE BLDC GLUCOMTR-MCNC: 88 MG/DL (ref 70–99)
GLUCOSE BLDC GLUCOMTR-MCNC: 91 MG/DL (ref 70–99)
GLUCOSE BLDC GLUCOMTR-MCNC: 96 MG/DL (ref 70–99)
GLUCOSE SERPL-MCNC: 89 MG/DL (ref 70–99)
HCT VFR BLD AUTO: 38.5 % (ref 40–53)
HGB BLD-MCNC: 13.3 G/DL (ref 13.3–17.7)
MCH RBC QN AUTO: 33.8 PG (ref 26.5–33)
MCHC RBC AUTO-ENTMCNC: 34.5 G/DL (ref 31.5–36.5)
MCV RBC AUTO: 98 FL (ref 78–100)
PLATELET # BLD AUTO: 85 10E9/L (ref 150–450)
POTASSIUM SERPL-SCNC: 3 MMOL/L (ref 3.4–5.3)
PROT SERPL-MCNC: 5.9 G/DL (ref 6.8–8.8)
RBC # BLD AUTO: 3.93 10E12/L (ref 4.4–5.9)
SODIUM SERPL-SCNC: 141 MMOL/L (ref 133–144)
WBC # BLD AUTO: 4.9 10E9/L (ref 4–11)

## 2020-09-02 PROCEDURE — 25000125 ZZHC RX 250: Performed by: NURSE PRACTITIONER

## 2020-09-02 PROCEDURE — 85027 COMPLETE CBC AUTOMATED: CPT | Performed by: STUDENT IN AN ORGANIZED HEALTH CARE EDUCATION/TRAINING PROGRAM

## 2020-09-02 PROCEDURE — 20000003 ZZH R&B ICU

## 2020-09-02 PROCEDURE — 25000128 H RX IP 250 OP 636: Performed by: STUDENT IN AN ORGANIZED HEALTH CARE EDUCATION/TRAINING PROGRAM

## 2020-09-02 PROCEDURE — 25800030 ZZH RX IP 258 OP 636: Performed by: NURSE PRACTITIONER

## 2020-09-02 PROCEDURE — 40000141 ZZH STATISTIC PERIPHERAL IV START W/O US GUIDANCE

## 2020-09-02 PROCEDURE — 80076 HEPATIC FUNCTION PANEL: CPT | Performed by: STUDENT IN AN ORGANIZED HEALTH CARE EDUCATION/TRAINING PROGRAM

## 2020-09-02 PROCEDURE — 25800030 ZZH RX IP 258 OP 636: Performed by: INTERNAL MEDICINE

## 2020-09-02 PROCEDURE — 25000128 H RX IP 250 OP 636: Performed by: INTERNAL MEDICINE

## 2020-09-02 PROCEDURE — 99232 SBSQ HOSP IP/OBS MODERATE 35: CPT | Performed by: PHYSICIAN ASSISTANT

## 2020-09-02 PROCEDURE — 25000128 H RX IP 250 OP 636: Performed by: EMERGENCY MEDICINE

## 2020-09-02 PROCEDURE — 36415 COLL VENOUS BLD VENIPUNCTURE: CPT | Performed by: STUDENT IN AN ORGANIZED HEALTH CARE EDUCATION/TRAINING PROGRAM

## 2020-09-02 PROCEDURE — 00000146 ZZHCL STATISTIC GLUCOSE BY METER IP

## 2020-09-02 PROCEDURE — 80048 BASIC METABOLIC PNL TOTAL CA: CPT | Performed by: STUDENT IN AN ORGANIZED HEALTH CARE EDUCATION/TRAINING PROGRAM

## 2020-09-02 PROCEDURE — 99232 SBSQ HOSP IP/OBS MODERATE 35: CPT | Performed by: STUDENT IN AN ORGANIZED HEALTH CARE EDUCATION/TRAINING PROGRAM

## 2020-09-02 RX ORDER — POTASSIUM CHLORIDE 7.45 MG/ML
10 INJECTION INTRAVENOUS
Status: DISCONTINUED | OUTPATIENT
Start: 2020-09-02 | End: 2020-09-09 | Stop reason: HOSPADM

## 2020-09-02 RX ORDER — POTASSIUM CL/LIDO/0.9 % NACL 10MEQ/0.1L
10 INTRAVENOUS SOLUTION, PIGGYBACK (ML) INTRAVENOUS
Status: DISCONTINUED | OUTPATIENT
Start: 2020-09-02 | End: 2020-09-09 | Stop reason: HOSPADM

## 2020-09-02 RX ORDER — POTASSIUM CHLORIDE 29.8 MG/ML
20 INJECTION INTRAVENOUS
Status: DISCONTINUED | OUTPATIENT
Start: 2020-09-02 | End: 2020-09-09 | Stop reason: HOSPADM

## 2020-09-02 RX ORDER — POTASSIUM CHLORIDE 1500 MG/1
20-40 TABLET, EXTENDED RELEASE ORAL
Status: DISCONTINUED | OUTPATIENT
Start: 2020-09-02 | End: 2020-09-09 | Stop reason: HOSPADM

## 2020-09-02 RX ORDER — POTASSIUM CHLORIDE 1.5 G/1.58G
20-40 POWDER, FOR SOLUTION ORAL
Status: DISCONTINUED | OUTPATIENT
Start: 2020-09-02 | End: 2020-09-09 | Stop reason: HOSPADM

## 2020-09-02 RX ADMIN — LORAZEPAM 2 MG: 2 INJECTION INTRAMUSCULAR; INTRAVENOUS at 17:17

## 2020-09-02 RX ADMIN — LORAZEPAM 2 MG: 2 INJECTION INTRAMUSCULAR; INTRAVENOUS at 06:42

## 2020-09-02 RX ADMIN — VALPROATE SODIUM 500 MG: 100 INJECTION, SOLUTION INTRAVENOUS at 08:03

## 2020-09-02 RX ADMIN — VALPROATE SODIUM 500 MG: 100 INJECTION, SOLUTION INTRAVENOUS at 01:17

## 2020-09-02 RX ADMIN — DEXMEDETOMIDINE HYDROCHLORIDE 0.7 MCG/KG/HR: 100 INJECTION, SOLUTION, CONCENTRATE INTRAVENOUS at 04:17

## 2020-09-02 RX ADMIN — DEXMEDETOMIDINE HYDROCHLORIDE 0.7 MCG/KG/HR: 100 INJECTION, SOLUTION, CONCENTRATE INTRAVENOUS at 13:23

## 2020-09-02 RX ADMIN — LORAZEPAM 2 MG: 2 INJECTION INTRAMUSCULAR; INTRAVENOUS at 13:00

## 2020-09-02 RX ADMIN — DEXMEDETOMIDINE HYDROCHLORIDE 0.7 MCG/KG/HR: 100 INJECTION, SOLUTION, CONCENTRATE INTRAVENOUS at 20:36

## 2020-09-02 RX ADMIN — POTASSIUM CHLORIDE 10 MEQ: 7.46 INJECTION, SOLUTION INTRAVENOUS at 20:45

## 2020-09-02 RX ADMIN — SODIUM CHLORIDE, POTASSIUM CHLORIDE, SODIUM LACTATE AND CALCIUM CHLORIDE: 600; 310; 30; 20 INJECTION, SOLUTION INTRAVENOUS at 13:23

## 2020-09-02 RX ADMIN — LORAZEPAM 1 MG: 2 INJECTION INTRAMUSCULAR; INTRAVENOUS at 04:22

## 2020-09-02 RX ADMIN — HALOPERIDOL LACTATE 5 MG: 5 INJECTION, SOLUTION INTRAMUSCULAR at 07:53

## 2020-09-02 RX ADMIN — LORAZEPAM 2 MG: 2 INJECTION INTRAMUSCULAR; INTRAVENOUS at 10:00

## 2020-09-02 RX ADMIN — POTASSIUM CHLORIDE 10 MEQ: 7.46 INJECTION, SOLUTION INTRAVENOUS at 15:48

## 2020-09-02 RX ADMIN — VALPROATE SODIUM 500 MG: 100 INJECTION, SOLUTION INTRAVENOUS at 16:33

## 2020-09-02 RX ADMIN — LORAZEPAM 2 MG: 2 INJECTION INTRAMUSCULAR; INTRAVENOUS at 14:08

## 2020-09-02 RX ADMIN — LORAZEPAM 2 MG: 2 INJECTION INTRAMUSCULAR; INTRAVENOUS at 15:38

## 2020-09-02 RX ADMIN — LORAZEPAM 2 MG: 2 INJECTION INTRAMUSCULAR; INTRAVENOUS at 01:07

## 2020-09-02 RX ADMIN — LORAZEPAM 2 MG: 2 INJECTION INTRAMUSCULAR; INTRAVENOUS at 07:32

## 2020-09-02 RX ADMIN — POTASSIUM CHLORIDE 10 MEQ: 7.46 INJECTION, SOLUTION INTRAVENOUS at 18:27

## 2020-09-02 RX ADMIN — LORAZEPAM 2 MG: 2 INJECTION INTRAMUSCULAR; INTRAVENOUS at 05:00

## 2020-09-02 RX ADMIN — LORAZEPAM 2 MG: 2 INJECTION INTRAMUSCULAR; INTRAVENOUS at 18:23

## 2020-09-02 ASSESSMENT — ACTIVITIES OF DAILY LIVING (ADL)
ADLS_ACUITY_SCORE: 13.5
ADLS_ACUITY_SCORE: 17
ADLS_ACUITY_SCORE: 13.5
ADLS_ACUITY_SCORE: 17
ADLS_ACUITY_SCORE: 13.5
ADLS_ACUITY_SCORE: 11.5

## 2020-09-02 ASSESSMENT — MIFFLIN-ST. JEOR: SCORE: 1463.63

## 2020-09-02 NOTE — PLAN OF CARE
Pt is confused, agitated, restless, tremulous, and impulsive.  Requires sitter at bedside.  CIWA acores 15-20 at times requiring Ativan IV.  Precedex infusing.  Bradycardic, 50's.  Voiding per urinal.  Passing gas.  NPO except meds.  Lung sounds are clear - diminished.  64-98% on 4 liters NC.

## 2020-09-02 NOTE — PLAN OF CARE
OT/PT: per discussion with RN, patient not appropriate for therapy today. Will reschedule for tomorrow-  09/03/2020.

## 2020-09-02 NOTE — PLAN OF CARE
Patient remains delirious on Precedex drip, oscillating between somnolence and acute agitation, trying to get out of bed and hallucinating. Patient's heart rate drops to 40s when sleeping on Precedex, however it quickly goes to 60s to 70s when awoken. Sitter at bedside. No new acute neurological symptom noted. Patient only voided 340 mL of urine during the shift; Bladder scan showed 350 mL present. Attempts for patient to void and stool at the change of shift.     Problem: Adult Inpatient Plan of Care  Goal: Plan of Care Review  Outcome: No Change  Flowsheets (Taken 9/1/2020 1926)  Plan of Care Reviewed With: patient  Progress: no change  Goal: Patient-Specific Goal (Individualization)  Outcome: No Change  Goal: Absence of Hospital-Acquired Illness or Injury  Outcome: No Change  Goal: Optimal Comfort and Wellbeing  Outcome: No Change  Goal: Readiness for Transition of Care  Outcome: No Change  Goal: Rounds/Family Conference  Outcome: No Change

## 2020-09-02 NOTE — PROGRESS NOTES
[facility]    Medicine Progress Note - Hospitalist Service     Date of Admission:  8/31/2020  Date of Service: 09/02/2020    Assessment & Plan The email address for your Park account has been updated        Jong Galarza is a 70 year old male with a history of HTN, HLP, anxiety and depression who presented to the ED on 8/31/2020 with concerns for alcohol withdrawal.  Work up in the ED though showed bilateral subdural hematomas with a 3 mm shift     Delirium Tremens  Alcohol abuse with concerns for withdrawal   Patient supposedly drinks 0.5 L of vodka daily with his last drink a day prior to arrival in the ED.  Has been tremulous in the ED with N/V and dizziness recently.  Did require Ativan x2 in the ED for withdrawal symptoms. Still had DTs despite CIWA protocol, precedex started by critical care service with improvement in agitation.   Plan:  - Continue Precedex infusion   - Valproic acid 500 mg q 8 hours. Recommend 3 day course per Critical care team  - CIWA protocol with PRN Ativan for now   - IV followed by PO multivitamins   - Will likely need CD or psychiatry evaluation once hematomas stable     Bilateral subdural hematomas   While in the ED the patient reported recent falls with the most recent one likely yesterday.  CT scan of the head was done and showed bilateral subdural hematomas with a 3 mm left to right midline shift.  Also there was a probable tiny amount of acute subdural hematoma on the right tentorium.  Neurosurgery was made aware in the ED and recommended admission to the ICU for further monitoring. Repeat CT head shows Stable hypodense subdural collections along the cerebral convexities on both sides with interval dissipation of the tiny focus of hyperdense hemorrhage noted along the lateral left temporal lobe since the comparison study.  Plan:  - Keep in CU  - Q4h neuro-checks  - Neurosurgery consulted and appreciate their recommendations -> no further intervention.    - PT/OT once withdrawals improved.     HTN   HLP   - Will resume PTA medications once improvement in alcohol withdrawal.     Anxiety/Depression   - Will resume PTA medications once confirmed by pharmacy and withdrawls improved           Diet: NPO for Medical/Clinical Reasons Except for: Meds    DVT Prophylaxis: Pneumatic Compression Devices  Forman Catheter: not present  Code Status: Full Code           Disposition Plan   Expected discharge: 2 - 3 days, recommended to transitional care unit once mental status at baseline.  Entered: Dave Sanchez MD 09/02/2020, 6:04 PM       The patient's care was discussed with the Bedside Nurse and Patient.    Dave Sanchez MD  Hospitalist Service  [facility]      Patient, family, interdisciplinary team involved in care and agrees with plan.  Total time - Greater than 35 min. More than 50% of time spent in direct patient care, care coordination, patient/caregiver counseling, and formalizing plan of care.     ______________________________________________________________________    Interval History     Continues to have hallucinations  Agitated, continuing to require       Data reviewed today: I reviewed all medications, new labs and imaging results over the last 24 hours. I personally reviewed no images or EKG's today.    Physical Exam   Vital Signs: Temp: 97.9  F (36.6  C) Temp src: Axillary BP: 130/80 Pulse: 59   Resp: 15 SpO2: 96 % O2 Device: Nasal cannula with humidification Oxygen Delivery: 4 LPM  Weight: 163 lbs 2.25 oz    Constitutional: somnolent from precedex infusion. no apparent distress  Respiratory: CTABL   Cardiovascular: RRR with no m/r/g   GI: Normal bowel sounds, soft, non-distended, non-tender.   Skin: normal skin color, texture, turgor   Musculoskeletal: There is no redness, warmth, or swelling of the joints.   Neurologic: limited due to sedation, but moving all extremities.   Neuropsychiatric: normal mood and affect      Data   Recent Labs   Lab  09/02/20  1112 08/31/20  1944   WBC 4.9 11.6*   HGB 13.3 13.9   MCV 98 97   PLT 85* 122*    140   POTASSIUM 3.0* 3.4   CHLORIDE 103 103   CO2 31 17*   BUN 17 54*   CR 0.66 1.18   ANIONGAP 7 20*   BLANCA 8.3* 9.2   GLC 89 116*   ALBUMIN 3.1* 3.8   PROTTOTAL 5.9* 6.8   BILITOTAL 1.4* 2.2*   ALKPHOS 93 129   ALT 87* 146*   * 269*     No results found for this or any previous visit (from the past 24 hour(s)).  Medications     dexmedetomidine 0.7 mcg/kg/hr (09/02/20 1323)     lactated ringers 75 mL/hr at 09/02/20 1600       folic acid  1 mg Oral Daily     multivitamin w/minerals  1 tablet Oral Daily     thiamine  100 mg Oral Daily     valproate (DEPACON) intermittent infusion  500 mg Intravenous Q8H

## 2020-09-02 NOTE — PLAN OF CARE
"Patient somewhat less restless today compared to yesterday, however still confused and hallucinating; Able to be redirected to stay in bed and use the urinal after initially trying to stand up and get out of bed. States that he is \"not in the hospital\" and that I am \"not his nurse.\" When reoriented, patient does not respond or does not believe he is in a hospital. Vital signs at this time stable. Bradycardic when asleep, normal sinus rhythm when awoken.      Patient's daughter called; She was not informed about her father's admission. Updated, all her questions answered and concerns addressed. She is currently in [place] and will not be able to be the designated visitor. Designated visitor will be one of patient's brothers (family needs to make a decision).     Patient's vital signs remained stable and within goal for the day. Potassium replacement being administered at the end of day shift. Sitter at bedside.       "

## 2020-09-02 NOTE — PROGRESS NOTES
[facility]    Neurosurgery Progress Note    Date of Service (when I saw the patient): 09/02/2020     Assessment & Plan   Jong Galarza is a 70 year old male who was admitted on 8/31/2020 presenting with alcohol withdrawal of the fall on Saturday with imaging findings revealing small right chronic subdural hematoma, small left acute on chronic subdural hematoma. Repeat imaging stable. Today, he is resting comfortably in bed. He has been agitated, but is less restless this AM. Per RN he has been more alert and following commands.     Active Problems:    Subdural hematoma (H)    Assessment: alcohol withdrawal of the fall on Saturday with imaging findings revealing small right chronic subdural hematoma, small left acute on chronic subdural hematoma. Repeat imaging stable.    Plan:   -Therapies when able  -In the event that patient's symptoms worsen or change we would appreciate being contacted  -Will continue to follow      I have discussed the following assessment and plan with Dr. Blanca who is in agreement with initial plan and will follow up with further consultation recommendations.      Keely Pulido PA-C  [facility] [place] [address]    Tel [phone]  Pager [phone]      Interval History   Stable    Physical Exam   Temp: 97.3  F (36.3  C) Temp src: Axillary BP: 131/76 Pulse: 50   Resp: 23 SpO2: 96 % O2 Device: Nasal cannula with humidification Oxygen Delivery: 4 LPM  Vitals:    09/01/20 0700 09/02/20 0300   Weight: 72.3 kg (159 lb 6.3 oz) 74 kg (163 lb 2.3 oz)     Vital Signs with Ranges  Temp:  [97.2  F (36.2  C)-98.1  F (36.7  C)] 97.3  F (36.3  C)  Pulse:  [46-80] 50  Resp:  [0-34] 23  BP: (112-154)/() 131/76  SpO2:  [92 %-97 %] 96 %  I/O last 3 completed shifts:  In: 1652.67 [I.V.:1652.67]  Out: 2080 [Urine:2080]     , Blood pressure 131/76, pulse 50, temperature 97.3  F (36.3  C), temperature  "source Axillary, resp. rate 23, height 1.702 m (5' 7\"), weight 74 kg (163 lb 2.3 oz), SpO2 96 %.  163 lbs 2.25 oz  HEENT:  Normocephalic, atraumatic.  PERRL.  Heart:  No peripheral edema  Lungs:  No SOB  Skin:  Warm and dry, good capillary refill.  Extremities:  Good radial and dorsalis pedis pulses bilaterally, no edema, cyanosis or clubbing.    NEUROLOGICAL EXAMINATION:   Mental status:  Awake and alert  Cranial nerves:  II-XII intact.   Motor:  MELENDEZ    Medications     dexmedetomidine 0.7 mcg/kg/hr (09/02/20 0800)     lactated ringers 75 mL/hr at 09/02/20 0800       folic acid  1 mg Oral Daily     multivitamin w/minerals  1 tablet Oral Daily     thiamine  100 mg Oral Daily     valproate (DEPACON) intermittent infusion  500 mg Intravenous Q8H       Data   CBC RESULTS:   Recent Labs   Lab Test 08/31/20 1944   WBC 11.6*   RBC 4.10*   HGB 13.9   HCT 39.9*   MCV 97   MCH 33.9*   MCHC 34.8   RDW 13.4   *     Basic Metabolic Panel:  Lab Results   Component Value Date     08/31/2020      Lab Results   Component Value Date    POTASSIUM 3.4 08/31/2020     Lab Results   Component Value Date    CHLORIDE 103 08/31/2020     Lab Results   Component Value Date    BLANCA 9.2 08/31/2020     Lab Results   Component Value Date    CO2 17 08/31/2020     Lab Results   Component Value Date    BUN 54 08/31/2020     Lab Results   Component Value Date    CR 1.18 08/31/2020     Lab Results   Component Value Date     08/31/2020     INR:  Lab Results   Component Value Date    INR 0.98 06/05/2017    INR 1.18 06/04/2017    INR 1.13 06/03/2017    INR 1.09 02/06/2006    INR 1.09 02/01/2006    INR 1.18 01/31/2006    INR 1.02 01/26/2006    INR 1.09 01/26/2006    INR 1.09 01/25/2006         "

## 2020-09-03 LAB
ANION GAP SERPL CALCULATED.3IONS-SCNC: 7 MMOL/L (ref 3–14)
BUN SERPL-MCNC: 11 MG/DL (ref 7–30)
CALCIUM SERPL-MCNC: 8.2 MG/DL (ref 8.5–10.1)
CHLORIDE SERPL-SCNC: 99 MMOL/L (ref 94–109)
CO2 SERPL-SCNC: 30 MMOL/L (ref 20–32)
CREAT SERPL-MCNC: 0.65 MG/DL (ref 0.66–1.25)
ERYTHROCYTE [DISTWIDTH] IN BLOOD BY AUTOMATED COUNT: 12.7 % (ref 10–15)
GFR SERPL CREATININE-BSD FRML MDRD: >90 ML/MIN/{1.73_M2}
GLUCOSE BLDC GLUCOMTR-MCNC: 88 MG/DL (ref 70–99)
GLUCOSE BLDC GLUCOMTR-MCNC: 96 MG/DL (ref 70–99)
GLUCOSE SERPL-MCNC: 91 MG/DL (ref 70–99)
HCT VFR BLD AUTO: 38.8 % (ref 40–53)
HGB BLD-MCNC: 13.4 G/DL (ref 13.3–17.7)
MCH RBC QN AUTO: 33.6 PG (ref 26.5–33)
MCHC RBC AUTO-ENTMCNC: 34.5 G/DL (ref 31.5–36.5)
MCV RBC AUTO: 97 FL (ref 78–100)
PLATELET # BLD AUTO: 75 10E9/L (ref 150–450)
POTASSIUM SERPL-SCNC: 2.7 MMOL/L (ref 3.4–5.3)
POTASSIUM SERPL-SCNC: 3 MMOL/L (ref 3.4–5.3)
RBC # BLD AUTO: 3.99 10E12/L (ref 4.4–5.9)
SARS-COV-2 RNA SPEC QL NAA+PROBE: NOT DETECTED
SODIUM SERPL-SCNC: 136 MMOL/L (ref 133–144)
SPECIMEN SOURCE: NORMAL
WBC # BLD AUTO: 5.4 10E9/L (ref 4–11)

## 2020-09-03 PROCEDURE — 25800030 ZZH RX IP 258 OP 636: Performed by: INTERNAL MEDICINE

## 2020-09-03 PROCEDURE — 25000128 H RX IP 250 OP 636: Performed by: INTERNAL MEDICINE

## 2020-09-03 PROCEDURE — 80048 BASIC METABOLIC PNL TOTAL CA: CPT | Performed by: STUDENT IN AN ORGANIZED HEALTH CARE EDUCATION/TRAINING PROGRAM

## 2020-09-03 PROCEDURE — 84132 ASSAY OF SERUM POTASSIUM: CPT | Performed by: STUDENT IN AN ORGANIZED HEALTH CARE EDUCATION/TRAINING PROGRAM

## 2020-09-03 PROCEDURE — 00000146 ZZHCL STATISTIC GLUCOSE BY METER IP

## 2020-09-03 PROCEDURE — 99232 SBSQ HOSP IP/OBS MODERATE 35: CPT | Performed by: STUDENT IN AN ORGANIZED HEALTH CARE EDUCATION/TRAINING PROGRAM

## 2020-09-03 PROCEDURE — 25000128 H RX IP 250 OP 636: Performed by: EMERGENCY MEDICINE

## 2020-09-03 PROCEDURE — 20000003 ZZH R&B ICU

## 2020-09-03 PROCEDURE — 25000128 H RX IP 250 OP 636: Performed by: STUDENT IN AN ORGANIZED HEALTH CARE EDUCATION/TRAINING PROGRAM

## 2020-09-03 PROCEDURE — 25000125 ZZHC RX 250: Performed by: NURSE PRACTITIONER

## 2020-09-03 PROCEDURE — 85027 COMPLETE CBC AUTOMATED: CPT | Performed by: STUDENT IN AN ORGANIZED HEALTH CARE EDUCATION/TRAINING PROGRAM

## 2020-09-03 PROCEDURE — 25000132 ZZH RX MED GY IP 250 OP 250 PS 637: Mod: GY | Performed by: STUDENT IN AN ORGANIZED HEALTH CARE EDUCATION/TRAINING PROGRAM

## 2020-09-03 PROCEDURE — 36415 COLL VENOUS BLD VENIPUNCTURE: CPT | Performed by: STUDENT IN AN ORGANIZED HEALTH CARE EDUCATION/TRAINING PROGRAM

## 2020-09-03 PROCEDURE — 25800030 ZZH RX IP 258 OP 636: Performed by: NURSE PRACTITIONER

## 2020-09-03 RX ADMIN — POTASSIUM CHLORIDE 10 MEQ: 7.46 INJECTION, SOLUTION INTRAVENOUS at 09:04

## 2020-09-03 RX ADMIN — DEXMEDETOMIDINE HYDROCHLORIDE 1 MCG/KG/HR: 100 INJECTION, SOLUTION, CONCENTRATE INTRAVENOUS at 04:00

## 2020-09-03 RX ADMIN — VALPROATE SODIUM 500 MG: 100 INJECTION, SOLUTION INTRAVENOUS at 01:45

## 2020-09-03 RX ADMIN — POTASSIUM CHLORIDE 10 MEQ: 7.46 INJECTION, SOLUTION INTRAVENOUS at 08:05

## 2020-09-03 RX ADMIN — POTASSIUM CHLORIDE 10 MEQ: 7.46 INJECTION, SOLUTION INTRAVENOUS at 06:55

## 2020-09-03 RX ADMIN — VALPROATE SODIUM 500 MG: 100 INJECTION, SOLUTION INTRAVENOUS at 17:11

## 2020-09-03 RX ADMIN — SODIUM CHLORIDE, POTASSIUM CHLORIDE, SODIUM LACTATE AND CALCIUM CHLORIDE: 600; 310; 30; 20 INJECTION, SOLUTION INTRAVENOUS at 15:00

## 2020-09-03 RX ADMIN — LORAZEPAM 2 MG: 2 INJECTION INTRAMUSCULAR; INTRAVENOUS at 01:43

## 2020-09-03 RX ADMIN — POTASSIUM CHLORIDE 40 MEQ: 1500 TABLET, EXTENDED RELEASE ORAL at 17:18

## 2020-09-03 RX ADMIN — HYDRALAZINE HYDROCHLORIDE 20 MG: 20 INJECTION INTRAMUSCULAR; INTRAVENOUS at 11:17

## 2020-09-03 RX ADMIN — DEXMEDETOMIDINE HYDROCHLORIDE 0.7 MCG/KG/HR: 100 INJECTION, SOLUTION, CONCENTRATE INTRAVENOUS at 17:08

## 2020-09-03 RX ADMIN — VALPROATE SODIUM 500 MG: 100 INJECTION, SOLUTION INTRAVENOUS at 09:10

## 2020-09-03 RX ADMIN — POTASSIUM CHLORIDE 10 MEQ: 7.46 INJECTION, SOLUTION INTRAVENOUS at 10:10

## 2020-09-03 RX ADMIN — POTASSIUM CHLORIDE 10 MEQ: 7.46 INJECTION, SOLUTION INTRAVENOUS at 05:54

## 2020-09-03 RX ADMIN — DEXMEDETOMIDINE HYDROCHLORIDE 1 MCG/KG/HR: 100 INJECTION, SOLUTION, CONCENTRATE INTRAVENOUS at 10:11

## 2020-09-03 RX ADMIN — POTASSIUM CHLORIDE 10 MEQ: 7.46 INJECTION, SOLUTION INTRAVENOUS at 11:14

## 2020-09-03 ASSESSMENT — ACTIVITIES OF DAILY LIVING (ADL)
ADLS_ACUITY_SCORE: 17
ADLS_ACUITY_SCORE: 21
ADLS_ACUITY_SCORE: 21
ADLS_ACUITY_SCORE: 17
ADLS_ACUITY_SCORE: 21
ADLS_ACUITY_SCORE: 17

## 2020-09-03 ASSESSMENT — MIFFLIN-ST. JEOR: SCORE: 1461.63

## 2020-09-03 NOTE — PROGRESS NOTES
[facility]    Neurosurgery Progress Note    Date of Service (when I saw the patient): 09/03/2020     Assessment & Plan   Jong Galarza is a 70 year old male who was admitted on 8/31/2020 presenting with alcohol withdrawal of the fall on Saturday with imaging findings revealing small right chronic subdural hematoma, small left acute on chronic subdural hematoma. Repeat imaging stable. Today, he is resting comfortably in bed. He has been agitated and per RN finally resting comfortably. Per RN has been stable overnight.  Repeat head CT was stable. In the event that patient's symptoms worsen or change we would appreciate being contacted. NS will sign off; please contact if further needed. Follow up in 4 weeks with repeat scan.     Active Problems:    Subdural hematoma (H)    Assessment: alcohol withdrawal of the fall on Saturday with imaging findings revealing small right chronic subdural hematoma, small left acute on chronic subdural hematoma. Repeat imaging stable.    Plan:   -Therapies when able  -No surgical intervention; NS will sign off and have him follow up in 4 weeks with repeat scan  -In the event that patient's symptoms worsen or change we would appreciate being contacted      I have discussed the following assessment and plan with Dr. Blanca who is in agreement with initial plan and will follow up with further consultation recommendations.      Keely Pulido PA-C  [facility] Neurosurgery  [address] [address]    Tel [phone]  Pager [phone]      Interval History   Stable    Physical Exam   Temp: 97.8  F (36.6  C) Temp src: Axillary BP: 137/85 Pulse: 55   Resp: (!) 36 SpO2: 96 % O2 Device: Nasal cannula with humidification Oxygen Delivery: 4 LPM  Vitals:    09/01/20 0700 09/02/20 0300 09/03/20 0300   Weight: 72.3 kg (159 lb 6.3 oz) 74 kg (163 lb 2.3 oz) 73.8 kg (162 lb 11.2 oz)     Vital Signs with Ranges  Temp:   "[97.2  F (36.2  C)-98.1  F (36.7  C)] 97.8  F (36.6  C)  Pulse:  [47-76] 55  Resp:  [8-41] 36  BP: (120-159)/() 137/85  SpO2:  [87 %-98 %] 96 %  I/O last 3 completed shifts:  In: 2837.88 [I.V.:2837.88]  Out: 1230 [Urine:1230]     , Blood pressure 137/85, pulse 55, temperature 97.8  F (36.6  C), temperature source Axillary, resp. rate (!) 36, height 1.702 m (5' 7\"), weight 73.8 kg (162 lb 11.2 oz), SpO2 96 %.  162 lbs 11.19 oz  HEENT:  Normocephalic, atraumatic.    Heart:  No peripheral edema  Lungs:  No SOB  Skin:  Warm and dry.  Extremities:  No edema, cyanosis or clubbing.    NEUROLOGICAL EXAMINATION:    Patient resting comfortably in bed.     Medications     dexmedetomidine 1 mcg/kg/hr (09/03/20 0816)     lactated ringers Stopped (09/03/20 0600)       folic acid  1 mg Oral Daily     multivitamin w/minerals  1 tablet Oral Daily     thiamine  100 mg Oral Daily     valproate (DEPACON) intermittent infusion  500 mg Intravenous Q8H       Data   CBC RESULTS:   Recent Labs   Lab Test 09/03/20  0447   WBC 5.4   RBC 3.99*   HGB 13.4   HCT 38.8*   MCV 97   MCH 33.6*   MCHC 34.5   RDW 12.7   PLT 75*     Basic Metabolic Panel:  Lab Results   Component Value Date     09/03/2020      Lab Results   Component Value Date    POTASSIUM 2.7 09/03/2020     Lab Results   Component Value Date    CHLORIDE 99 09/03/2020     Lab Results   Component Value Date    BLANCA 8.2 09/03/2020     Lab Results   Component Value Date    CO2 30 09/03/2020     Lab Results   Component Value Date    BUN 11 09/03/2020     Lab Results   Component Value Date    CR 0.65 09/03/2020     Lab Results   Component Value Date    GLC 91 09/03/2020     INR:  Lab Results   Component Value Date    INR 0.98 06/05/2017    INR 1.18 06/04/2017    INR 1.13 06/03/2017    INR 1.09 02/06/2006    INR 1.09 02/01/2006    INR 1.18 01/31/2006    INR 1.02 01/26/2006    INR 1.09 01/26/2006    INR 1.09 01/25/2006         "

## 2020-09-03 NOTE — PROGRESS NOTES
"CLINICAL NUTRITION SERVICES  -  ASSESSMENT NOTE      RECOMMENDATIONS FOR MD/PROVIDER TO ORDER:   Recommend consider TF if unable to advance diet in the next 24-48 hrs.   Malnutrition:   % Weight Loss:  Up to 10% in 6 months (non-severe malnutrition)  % Intake:  </= 50% for >/= 5 days (severe malnutrition) - On 9/4  Subcutaneous Fat Loss:  None observed  Muscle Loss:  Temporal region mild depletion, Clavicle bone region mild depletion, Acromion bone region mild depletion, Patellar region mild depletion, Anterior thigh region mild depletion and Posterior calf region mild depletion  Fluid Retention:  None noted    Malnutrition Diagnosis: Non-Severe malnutrition  In Context of:  Acute illness or injury  Environmental or social circumstances        REASON FOR ASSESSMENT  Jong Galarza is a 70 year old male seen by Registered Dietitian for NPO x 4 days      NUTRITION HISTORY  - Unable to obtain nutrition history as patient confused.  Information obtained from Epic.  ETOH history = Pt drinks a half to a liter of vodka a day for the past 10 years.  He has h/o HTN, HLP, anxiety and depression.   He was admitted with nausea and vomiting.  No food allergies/intolerances noted.      CURRENT NUTRITION ORDERS  Diet Order:     NPO     Current Intake/Tolerance:  NPO x 4 days      NUTRITION FOCUSED PHYSICAL ASSESSMENT FOR DIAGNOSING MALNUTRITION)  Yes          Observed:    Muscle wasting (refer to documentation in Malnutrition section)    Obtained from Chart/Interdisciplinary Team:  Appears malnourished per RN report earlier in the week    ANTHROPOMETRICS  Height: 5' 7\"  Admit Weight:  72.3 kg  Body mass index is 24.9 kg/m .  Weight Status:  Normal BMI  IBW:  67.3 kg  % IBW:  107%  Weight History:    Per Care Everywhere, wt on 1/31/20 was 81.2 kg  Thus weight loss 9 kg (11%) over the past 8 months    Wt Readings from Last 10 Encounters:   09/03/20 73.8 kg (162 lb 11.2 oz)   06/08/17 69.8 kg (153 lb 12.8 oz)   06/05/17 76.2 kg " (167 lb 15.9 oz)   06/03/17 79.4 kg (175 lb)       LABS  Labs reviewed  K 2.7 (L)    MEDICATIONS  Medications reviewed  Precedex - for sedation  Folic acid, Vit B1, MVI-M - for ETOH history      ASSESSED NUTRITION NEEDS PER APPROVED PRACTICE GUIDELINES:    Dosing Weight:  72.3 kg (admit wt)  Estimated Energy Needs: 8655-0847 kcals (25-30 Kcal/Kg)  Justification: maintenance  Estimated Protein Needs:   grams protein (1.2-1.5 g pro/Kg)  Justification: Repletion and hypercatabolism with acute illness  Estimated Fluid Needs:  5397-0123 mL (1 mL/Kcal)  Justification: maintenance    MALNUTRITION:  % Weight Loss:  Up to 10% in 6 months (non-severe malnutrition)  % Intake:  </= 50% for >/= 5 days (severe malnutrition) - On 9/4  Subcutaneous Fat Loss:  None observed  Muscle Loss:  Temporal region mild depletion, Clavicle bone region mild depletion, Acromion bone region mild depletion, Patellar region mild depletion, Anterior thigh region mild depletion and Posterior calf region mild depletion  Fluid Retention:  None noted    Malnutrition Diagnosis: Non-Severe malnutrition  In Context of:  Acute illness or injury  Environmental or social circumstances    NUTRITION DIAGNOSIS:  Inadequate protein-energy intake related to confusion as evidenced by NPO x 4 days, meeting 0% needs.      NUTRITION INTERVENTIONS  Recommendations / Nutrition Prescription  Recommend consider TF if unable to advance diet in the next 24-48 hrs.      Implementation  Nutrition education: Not appropriate at this time due to patient condition    Nutrition Goals  Nutrition will be addressed in the next 24-48 hrs (diet advancement vs TF)      MONITORING AND EVALUATION:  Progress towards goals will be monitored and evaluated per protocol and Practice Guidelines    Gisella Shaw, MARYANN, LD, CNSC

## 2020-09-03 NOTE — PROGRESS NOTES
[facility]    Medicine Progress Note - Hospitalist Service     Date of Admission:  8/31/2020  Date of Service: 09/03/2020    Assessment & Plan The email address for your [facility] account has been updated        Jong Galarza is a 70 year old male with a history of HTN, HLP, anxiety and depression who presented to the ED on 8/31/2020 with concerns for alcohol withdrawal.  Work up in the ED though showed bilateral subdural hematomas with a 3 mm shift     Delirium Tremens  Alcohol abuse with concerns for withdrawal   Patient supposedly drinks 0.5 L of vodka daily with his last drink a day prior to arrival in the ED.  Has been tremulous in the ED with N/V and dizziness recently.  Did require Ativan x2 in the ED for withdrawal symptoms. Still had DTs despite CIWA protocol, precedex gtt started by critical care service with improvement in agitation.   Plan:  - Keep in ICU as still needing precedex gtt  - Continue Precedex infusion, wean as patient tolerates  - Valproic acid 500 mg q 8 hours. Recommend 3 day course per Critical care team  - CIWA protocol with PRN Ativan f  - PO multivitamins   - Will likely need CD or psychiatry evaluation once withdrawals improve and out of ICU    Bilateral subdural hematomas   While in the ED the patient reported recent falls with the most recent one likely yesterday.  CT scan of the head was done and showed bilateral subdural hematomas with a 3 mm left to right midline shift.  Also there was a probable tiny amount of acute subdural hematoma on the right tentorium.  Neurosurgery was made aware in the ED and recommended admission to the ICU for further monitoring. Repeat CT head shows Stable hypodense subdural collections along the cerebral convexities on both sides with interval dissipation of the tiny focus of hyperdense hemorrhage noted along the lateral left temporal lobe since the comparison study.  Plan:  - Q4h neuro-checks  - Neurosurgery consulted and  appreciate their recommendations -> no further intervention.   - PT/OT once withdrawals improved.     HTN   HLP   - Will resume PTA medications once improvement in alcohol withdrawal.     Anxiety/Depression   - Will resume PTA medications once confirmed by pharmacy and withdrawls improved           Diet: Advance Diet as Tolerated: Regular Diet Adult    DVT Prophylaxis: Pneumatic Compression Devices  Forman Catheter: not present  Code Status: Full Code           Disposition Plan   Expected discharge: 2 - 3 days, recommended to transitional care unit once mental status at baseline.  Entered: Dave Sanchez MD 09/03/2020, 5:53 PM       The patient's care was discussed with the Bedside Nurse and Patient.    Dave Sanchez MD  Hospitalist Service  [facility]  ______________________________________________________________________    Interval History     Improving today, no SI/HI, no hallucinations  But still needing Precedex gtt   No fevers or chills  No CP/SOB  No nausea/vomiting or abdominal pain  No complaints    Data reviewed today: I reviewed all medications, new labs and imaging results over the last 24 hours. I personally reviewed no images or EKG's today.    Physical Exam   Vital Signs: Temp: 98  F (36.7  C) Temp src: Oral BP: 138/84 Pulse: 67   Resp: 11 SpO2: 97 % O2 Device: None (Room air) Oxygen Delivery: 4 LPM  Weight: 162 lbs 11.19 oz    Constitutional: somnolent from precedex infusion. no apparent distress  Respiratory: CTABL   Cardiovascular: RRR with no m/r/g   GI: Normal bowel sounds, soft, non-distended, non-tender.   Skin: normal skin color, texture, turgor   Musculoskeletal: There is no redness, warmth, or swelling of the joints.   Neurologic: limited due to sedation, but moving all extremities.   Neuropsychiatric: normal mood and affect      Data   Recent Labs   Lab 09/03/20  1547 09/03/20  0447 09/02/20  1112 08/31/20  1944   WBC  --  5.4 4.9 11.6*   HGB  --  13.4 13.3 13.9   MCV  --  97 98  97   PLT  --  75* 85* 122*   NA  --  136 141 140   POTASSIUM 3.0* 2.7* 3.0* 3.4   CHLORIDE  --  99 103 103   CO2  --  30 31 17*   BUN  --  11 17 54*   CR  --  0.65* 0.66 1.18   ANIONGAP  --  7 7 20*   BLANCA  --  8.2* 8.3* 9.2   GLC  --  91 89 116*   ALBUMIN  --   --  3.1* 3.8   PROTTOTAL  --   --  5.9* 6.8   BILITOTAL  --   --  1.4* 2.2*   ALKPHOS  --   --  93 129   ALT  --   --  87* 146*   AST  --   --  100* 269*     No results found for this or any previous visit (from the past 24 hour(s)).  Medications     dexmedetomidine 0.5 mcg/kg/hr (09/03/20 1723)     lactated ringers 75 mL/hr at 09/03/20 1500       folic acid  1 mg Oral Daily     multivitamin w/minerals  1 tablet Oral Daily     thiamine  100 mg Oral Daily     valproate (DEPACON) intermittent infusion  500 mg Intravenous Q8H

## 2020-09-04 ENCOUNTER — APPOINTMENT (OUTPATIENT)
Dept: OCCUPATIONAL THERAPY | Facility: CLINIC | Age: 70
DRG: 896 | End: 2020-09-04
Attending: INTERNAL MEDICINE
Payer: COMMERCIAL

## 2020-09-04 ENCOUNTER — APPOINTMENT (OUTPATIENT)
Dept: PHYSICAL THERAPY | Facility: CLINIC | Age: 70
DRG: 896 | End: 2020-09-04
Attending: INTERNAL MEDICINE
Payer: COMMERCIAL

## 2020-09-04 LAB
ANION GAP SERPL CALCULATED.3IONS-SCNC: 5 MMOL/L (ref 3–14)
BUN SERPL-MCNC: 9 MG/DL (ref 7–30)
CALCIUM SERPL-MCNC: 8.9 MG/DL (ref 8.5–10.1)
CHLORIDE SERPL-SCNC: 99 MMOL/L (ref 94–109)
CO2 SERPL-SCNC: 32 MMOL/L (ref 20–32)
CREAT SERPL-MCNC: 0.62 MG/DL (ref 0.66–1.25)
GFR SERPL CREATININE-BSD FRML MDRD: >90 ML/MIN/{1.73_M2}
GLUCOSE SERPL-MCNC: 93 MG/DL (ref 70–99)
POTASSIUM SERPL-SCNC: 3 MMOL/L (ref 3.4–5.3)
POTASSIUM SERPL-SCNC: 3.1 MMOL/L (ref 3.4–5.3)
SODIUM SERPL-SCNC: 136 MMOL/L (ref 133–144)

## 2020-09-04 PROCEDURE — 25800030 ZZH RX IP 258 OP 636: Performed by: NURSE PRACTITIONER

## 2020-09-04 PROCEDURE — 36415 COLL VENOUS BLD VENIPUNCTURE: CPT | Performed by: INTERNAL MEDICINE

## 2020-09-04 PROCEDURE — 97535 SELF CARE MNGMENT TRAINING: CPT | Mod: GO | Performed by: OCCUPATIONAL THERAPIST

## 2020-09-04 PROCEDURE — 25800030 ZZH RX IP 258 OP 636: Performed by: INTERNAL MEDICINE

## 2020-09-04 PROCEDURE — 97166 OT EVAL MOD COMPLEX 45 MIN: CPT | Mod: GO | Performed by: OCCUPATIONAL THERAPIST

## 2020-09-04 PROCEDURE — 97110 THERAPEUTIC EXERCISES: CPT | Mod: GP

## 2020-09-04 PROCEDURE — 25000128 H RX IP 250 OP 636: Performed by: EMERGENCY MEDICINE

## 2020-09-04 PROCEDURE — 25000125 ZZHC RX 250: Performed by: NURSE PRACTITIONER

## 2020-09-04 PROCEDURE — 25000132 ZZH RX MED GY IP 250 OP 250 PS 637: Mod: GY | Performed by: INTERNAL MEDICINE

## 2020-09-04 PROCEDURE — 97161 PT EVAL LOW COMPLEX 20 MIN: CPT | Mod: GP

## 2020-09-04 PROCEDURE — 84132 ASSAY OF SERUM POTASSIUM: CPT | Performed by: INTERNAL MEDICINE

## 2020-09-04 PROCEDURE — 80048 BASIC METABOLIC PNL TOTAL CA: CPT | Performed by: OBSTETRICS & GYNECOLOGY

## 2020-09-04 PROCEDURE — 36415 COLL VENOUS BLD VENIPUNCTURE: CPT | Performed by: OBSTETRICS & GYNECOLOGY

## 2020-09-04 PROCEDURE — 25000128 H RX IP 250 OP 636: Performed by: STUDENT IN AN ORGANIZED HEALTH CARE EDUCATION/TRAINING PROGRAM

## 2020-09-04 PROCEDURE — 20000003 ZZH R&B ICU

## 2020-09-04 PROCEDURE — 25000132 ZZH RX MED GY IP 250 OP 250 PS 637: Mod: GY | Performed by: STUDENT IN AN ORGANIZED HEALTH CARE EDUCATION/TRAINING PROGRAM

## 2020-09-04 PROCEDURE — 99232 SBSQ HOSP IP/OBS MODERATE 35: CPT | Performed by: STUDENT IN AN ORGANIZED HEALTH CARE EDUCATION/TRAINING PROGRAM

## 2020-09-04 PROCEDURE — 97530 THERAPEUTIC ACTIVITIES: CPT | Mod: GP

## 2020-09-04 RX ORDER — PRAVASTATIN SODIUM 20 MG
40 TABLET ORAL AT BEDTIME
Status: DISCONTINUED | OUTPATIENT
Start: 2020-09-04 | End: 2020-09-09 | Stop reason: HOSPADM

## 2020-09-04 RX ORDER — ACETAMINOPHEN 325 MG/1
650 TABLET ORAL EVERY 6 HOURS PRN
Status: DISCONTINUED | OUTPATIENT
Start: 2020-09-04 | End: 2020-09-06

## 2020-09-04 RX ORDER — VENLAFAXINE HYDROCHLORIDE 150 MG/1
300 CAPSULE, EXTENDED RELEASE ORAL DAILY
Status: DISCONTINUED | OUTPATIENT
Start: 2020-09-05 | End: 2020-09-09 | Stop reason: HOSPADM

## 2020-09-04 RX ORDER — AMLODIPINE BESYLATE 5 MG/1
5 TABLET ORAL DAILY
Status: DISCONTINUED | OUTPATIENT
Start: 2020-09-05 | End: 2020-09-07

## 2020-09-04 RX ORDER — ACETAMINOPHEN 325 MG/1
TABLET ORAL
Status: DISCONTINUED
Start: 2020-09-04 | End: 2020-09-05 | Stop reason: HOSPADM

## 2020-09-04 RX ORDER — METOPROLOL SUCCINATE 100 MG/1
100 TABLET, EXTENDED RELEASE ORAL DAILY
Status: DISCONTINUED | OUTPATIENT
Start: 2020-09-05 | End: 2020-09-09 | Stop reason: HOSPADM

## 2020-09-04 RX ORDER — BUPROPION HYDROCHLORIDE 150 MG/1
150 TABLET ORAL EVERY MORNING
Status: DISCONTINUED | OUTPATIENT
Start: 2020-09-05 | End: 2020-09-04

## 2020-09-04 RX ORDER — LEVETIRACETAM 750 MG/1
750 TABLET, FILM COATED, EXTENDED RELEASE ORAL AT BEDTIME
Status: DISCONTINUED | OUTPATIENT
Start: 2020-09-04 | End: 2020-09-09 | Stop reason: HOSPADM

## 2020-09-04 RX ADMIN — VALPROATE SODIUM 500 MG: 100 INJECTION, SOLUTION INTRAVENOUS at 08:56

## 2020-09-04 RX ADMIN — THIAMINE HCL TAB 100 MG 100 MG: 100 TAB at 08:56

## 2020-09-04 RX ADMIN — LEVETIRACETAM 750 MG: 750 TABLET, EXTENDED RELEASE ORAL at 21:06

## 2020-09-04 RX ADMIN — LORAZEPAM 2 MG: 1 TABLET ORAL at 23:28

## 2020-09-04 RX ADMIN — HALOPERIDOL LACTATE 5 MG: 5 INJECTION, SOLUTION INTRAMUSCULAR at 23:11

## 2020-09-04 RX ADMIN — POTASSIUM CHLORIDE 10 MEQ: 7.46 INJECTION, SOLUTION INTRAVENOUS at 06:18

## 2020-09-04 RX ADMIN — Medication 10 MEQ: at 11:09

## 2020-09-04 RX ADMIN — FOLIC ACID 1 MG: 1 TABLET ORAL at 08:56

## 2020-09-04 RX ADMIN — LORAZEPAM 2 MG: 1 TABLET ORAL at 21:03

## 2020-09-04 RX ADMIN — SODIUM CHLORIDE, POTASSIUM CHLORIDE, SODIUM LACTATE AND CALCIUM CHLORIDE: 600; 310; 30; 20 INJECTION, SOLUTION INTRAVENOUS at 14:48

## 2020-09-04 RX ADMIN — ACETAMINOPHEN 650 MG: 325 TABLET, FILM COATED ORAL at 23:28

## 2020-09-04 RX ADMIN — POTASSIUM CHLORIDE 10 MEQ: 7.46 INJECTION, SOLUTION INTRAVENOUS at 07:11

## 2020-09-04 RX ADMIN — ACETAMINOPHEN 650 MG: 325 TABLET, FILM COATED ORAL at 14:52

## 2020-09-04 RX ADMIN — POTASSIUM CHLORIDE 40 MEQ: 1500 TABLET, EXTENDED RELEASE ORAL at 16:46

## 2020-09-04 RX ADMIN — MULTIPLE VITAMINS W/ MINERALS TAB 1 TABLET: TAB at 08:56

## 2020-09-04 RX ADMIN — LORAZEPAM 2 MG: 2 INJECTION INTRAMUSCULAR; INTRAVENOUS at 23:11

## 2020-09-04 RX ADMIN — VALPROATE SODIUM 500 MG: 100 INJECTION, SOLUTION INTRAVENOUS at 01:35

## 2020-09-04 RX ADMIN — DEXMEDETOMIDINE HYDROCHLORIDE 0.3 MCG/KG/HR: 100 INJECTION, SOLUTION, CONCENTRATE INTRAVENOUS at 14:09

## 2020-09-04 RX ADMIN — PRAVASTATIN SODIUM 40 MG: 40 TABLET ORAL at 21:06

## 2020-09-04 RX ADMIN — LORAZEPAM 2 MG: 1 TABLET ORAL at 22:39

## 2020-09-04 RX ADMIN — DEXMEDETOMIDINE HYDROCHLORIDE 0.7 MCG/KG/HR: 100 INJECTION, SOLUTION, CONCENTRATE INTRAVENOUS at 04:02

## 2020-09-04 RX ADMIN — Medication 10 MEQ: at 08:29

## 2020-09-04 RX ADMIN — VALPROATE SODIUM 500 MG: 100 INJECTION, SOLUTION INTRAVENOUS at 16:25

## 2020-09-04 RX ADMIN — POTASSIUM CHLORIDE 20 MEQ: 1500 TABLET, EXTENDED RELEASE ORAL at 20:10

## 2020-09-04 ASSESSMENT — ACTIVITIES OF DAILY LIVING (ADL)
ADLS_ACUITY_SCORE: 21
ADLS_ACUITY_SCORE: 21
ADLS_ACUITY_SCORE: 19
ADLS_ACUITY_SCORE: 21
ADLS_ACUITY_SCORE: 21
ADLS_ACUITY_SCORE: 19
PREVIOUS_RESPONSIBILITIES: MEAL PREP;SHOPPING;MEDICATION MANAGEMENT;FINANCES

## 2020-09-04 ASSESSMENT — MIFFLIN-ST. JEOR: SCORE: 1464.63

## 2020-09-04 NOTE — PROGRESS NOTES
[facility]    Medicine Progress Note - Hospitalist Service     Date of Admission:  8/31/2020  Date of Service: 09/04/2020    Assessment & Plan The email address for your [facility] account has been updated          Jong Galarza is a 70 year old male with a history of HTN, HLP, anxiety and depression who presented to the ED on 8/31/2020 with concerns for alcohol withdrawal.  Work up in the ED though showed bilateral subdural hematomas with a 3 mm shift     Delirium Tremens  Alcohol abuse with concerns for withdrawal   Patient supposedly drinks 0.5 L of vodka daily with his last drink a day prior to arrival in the ED.  Has been tremulous in the ED with N/V and dizziness recently.  Did require Ativan x2 in the ED for withdrawal symptoms. Still had DTs despite CIWA protocol, precedex gtt started by critical care service with improvement in agitation.   Plan:  - Keep in ICU as still needing precedex gtt  - Continue Precedex infusion, wean as patient tolerates  - Valproic acid 500 mg q 8 hours. Recommend 3 day course per Critical care team  - CIWA protocol with PRN Ativan f  - PO multivitamins   - Needs CD or psychiatry evaluation once withdrawals improve and out of ICU    Bilateral subdural hematomas   While in the ED the patient reported recent falls with the most recent one likely yesterday.  CT scan of the head was done and showed bilateral subdural hematomas with a 3 mm left to right midline shift.  Also there was a probable tiny amount of acute subdural hematoma on the right tentorium.  Neurosurgery was made aware in the ED and recommended admission to the ICU for further monitoring. Repeat CT head shows Stable hypodense subdural collections along the cerebral convexities on both sides with interval dissipation of the tiny focus of hyperdense hemorrhage noted along the lateral left temporal lobe since the comparison study.  Plan:  - Q4h neuro-checks  - Neurosurgery consulted and appreciate  their recommendations -> no further intervention.   - PT/OT once withdrawals improved.     HTN   HLP   - Will resume PTA medications    Anxiety/Depression   - resume PTA medications       Diet: Advance Diet as Tolerated: Regular Diet Adult    DVT Prophylaxis: Pneumatic Compression Devices  Forman Catheter: not present  Code Status: Full Code           Disposition Plan   Expected discharge: 2 - 3 days, recommended to transitional care unit once mental status at baseline.  Entered: Dave Sanchez MD 09/04/2020, 4:48 PM       The patient's care was discussed with the Bedside Nurse and Patient.    Dave Sanchez MD  Hospitalist Service  [facility]  ______________________________________________________________________    Interval History     No acute events overnight  no SI/HI, no hallucinations  still needing Precedex gtt   No fevers or chills  No CP/SOB  No nausea/vomiting or abdominal pain  No complaints    Data reviewed today: I reviewed all medications, new labs and imaging results over the last 24 hours. I personally reviewed no images or EKG's today.    Physical Exam   Vital Signs: Temp: 98.4  F (36.9  C) Temp src: Oral BP: (!) 159/101 Pulse: 81   Resp: 14 SpO2: 96 % O2 Device: None (Room air)    Weight: 163 lbs 5.77 oz    Constitutional: somnolent from precedex infusion. no apparent distress  Respiratory: CTABL   Cardiovascular: RRR with no m/r/g   GI: Normal bowel sounds, soft, non-distended, non-tender.   Skin: normal skin color, texture, turgor   Musculoskeletal: There is no redness, warmth, or swelling of the joints.   Neurologic: limited due to sedation, but moving all extremities.   Neuropsychiatric: normal mood and affect      Data   Recent Labs   Lab 09/04/20  1553 09/04/20  0518 09/03/20  1547 09/03/20  0447 09/02/20  1112 08/31/20  1944   WBC  --   --   --  5.4 4.9 11.6*   HGB  --   --   --  13.4 13.3 13.9   MCV  --   --   --  97 98 97   PLT  --   --   --  75* 85* 122*   NA  --  136  --  136  141 140   POTASSIUM 3.0* 3.1* 3.0* 2.7* 3.0* 3.4   CHLORIDE  --  99  --  99 103 103   CO2  --  32  --  30 31 17*   BUN  --  9  --  11 17 54*   CR  --  0.62*  --  0.65* 0.66 1.18   ANIONGAP  --  5  --  7 7 20*   BLANCA  --  8.9  --  8.2* 8.3* 9.2   GLC  --  93  --  91 89 116*   ALBUMIN  --   --   --   --  3.1* 3.8   PROTTOTAL  --   --   --   --  5.9* 6.8   BILITOTAL  --   --   --   --  1.4* 2.2*   ALKPHOS  --   --   --   --  93 129   ALT  --   --   --   --  87* 146*   AST  --   --   --   --  100* 269*     No results found for this or any previous visit (from the past 24 hour(s)).  Medications     dexmedetomidine Stopped (09/04/20 1622)     lactated ringers 10 mL/hr at 09/04/20 1448       acetaminophen         folic acid  1 mg Oral Daily     multivitamin w/minerals  1 tablet Oral Daily     thiamine  100 mg Oral Daily     valproate (DEPACON) intermittent infusion  500 mg Intravenous Q8H

## 2020-09-04 NOTE — PROGRESS NOTES
09/04/20 1028   Quick Adds   Type of Visit Initial Occupational Therapy Evaluation   Living Environment   Lives With alone   Living Arrangements house   Home Accessibility stairs within home   Living Environment Comment steps to basement   Self-Care   Usual Activity Tolerance moderate   Current Activity Tolerance poor   Equipment Currently Used at Home none   Activity/Exercise/Self-Care Comment Pt does not drive. He has a  once per week. He is a retired . Normally he is (I) with meds, finances.   Functional Level   Ambulation 0-->independent   Transferring 0-->independent   Toileting 0-->independent   Bathing 0-->independent   Dressing 0-->independent   Eating 0-->independent   Communication 0-->understands/communicates without difficulty   Swallowing 0-->swallows foods/liquids without difficulty   Cognition 0 - no cognition issues reported   Fall history within last six months yes   Prior Functional Level Comment Pt reports 2 recent falls, likely more per chart.   General Information   Onset of Illness/Injury or Date of Surgery - Date 09/01/20   Referring Physician Bakari Snyder, DO   Patient/Family Goals Statement get better, go home   Additional Occupational Profile Info/Pertinent History of Current Problem Jong Galarza is a 70 year old male with a history of HTN, HLP, anxiety and depression who presented to the ED on 8/31/2020 with concerns for alcohol withdrawal.  Work up in the ED though showed bilateral subdural hematomas with a 3 mm shift    Precautions/Limitations fall precautions   General Observations In ICU, OK to see per RN   Cognitive Status Examination   Orientation person;place   Level of Consciousness alert   Follows Commands (Cognition) follows one step commands;over 90% accuracy   Memory impaired   Attention No deficits were identified   Executive Function Impulsive   Cognitive Comment impaired insight into why he is hospitalized. Reports it was be cause he was  nauseous   Visual Perception   Visual Perception Wears glasses   Visual Field intact   Visual Attention intact   Occulomotor impaired ability to  track finger with both eyes   Sensory Examination   Sensory Quick Adds No deficits were identified   Pain Assessment   Patient Currently in Pain No   Integumentary/Edema   Integumentary/Edema Comments multiple abrasions, bruises all over body   Posture   Posture Comments very forward flexed, protracted shoulders   Range of Motion (ROM)   ROM Comment BUE AROM WFL. Broke R wrist/forearm 2 months ago per report   Strength   Strength Comments 4/5 shoulder strength, 5/5 biceps/triceps   Hand Strength   Hand Strength Comments strong grasp both hands   Coordination   Coordination Comments mild tremor with activity   Mobility   Bed Mobility Comments Min A supine to EOB   Transfer Skill: Bed to Chair/Chair to Bed   Level of [place]: Bed to Chair moderate assist (50% patients effort)   Physical Assist/Nonphysical Assist: Bed to Chair verbal cues   Transfer Skill: Sit to Stand   Level of [place]: Sit/Stand minimum assist (75% patients effort)   Physical Assist/Nonphysical Assist: Sit/Stand verbal cues   Assistive Device for Transfer: Sit/Stand rolling walker   Toilet Transfer   Toilet Transfer Comments not attempted, pt was incontinent of BM, would need bedside commode at this time and A of 1   Balance   Balance Comments Sat EOB with poor posture but SBA   Upper Body Dressing   Level of [place]: Dress Upper Body minimum assist (75% patients effort)   Physical Assist/Nonphysical Assist: Dress Upper Body verbal cues   Lower Body Dressing   Level of [place]: Dress Lower Body maximum assist (25% patients effort)   Physical Assist/Nonphysical Assist: Dress Lower Body verbal cues   Toileting   Level of [place]: Toilet maximum assist (25% patients effort)   Grooming   Level of [place]: Grooming minimum assist (75% patients effort)   Physical Assist/Nonphysical  "Assist: Grooming verbal cues   Eating/Self Feeding   Level of [place]: Eating independent   Physical Assist/Nonphysical Assist: Eating set-up required   Instrumental Activities of Daily Living (IADL)   Previous Responsibilities meal prep;shopping;medication management;finances   Activities of Daily Living Analysis   Impairments Contributing to Impaired Activities of Daily Living balance impaired;cognition impaired;coordination impaired;flexibility decreased;motor control impaired;postural control impaired;strength decreased   General Therapy Interventions   Planned Therapy Interventions ADL retraining;IADL retraining;balance training;cognition;transfer training;strengthening;neuromuscular re-education;risk factor education;progressive activity/exercise;visual perception   Clinical Impression   Criteria for Skilled Therapeutic Interventions Met yes, treatment indicated   OT Diagnosis impaired ADL and mobility   Influenced by the following impairments impaired cognition, ICU status   Assessment of Occupational Performance 5 or more Performance Deficits   Identified Performance Deficits ADL, IADL, mobility   Clinical Decision Making (Complexity) Moderate complexity   Therapy Frequency Daily   Predicted Duration of Therapy Intervention (days/wks) 1 week   Anticipated Equipment Needs at Discharge shower chair   Anticipated Discharge Disposition Transitional Care Facility   Risks and Benefits of Treatment have been explained. Yes   Patient, Family & other staff in agreement with plan of care Yes   [facility] TM \"6 Clicks\"   2016, Trustees of [facility], under license to [employer].  All rights reserved.   6 Clicks Short Forms Daily Activity Inpatient Short Form   [facility]  \"6 Clicks\" Daily Activity Inpatient Short Form   1. Putting on and taking off regular lower body clothing? 2 - A Lot   2. Bathing (including washing, rinsing, drying)? 2 - A Lot   3. Toileting, which includes " using toilet, bedpan or urinal? 2 - A Lot   4. Putting on and taking off regular upper body clothing? 3 - A Little   5. Taking care of personal grooming such as brushing teeth? 3 - A Little   6. Eating meals? 4 - None   Daily Activity Raw Score (Score out of 24.Lower scores equate to lower levels of function) 16   Total Evaluation Time   Total Evaluation Time (Minutes) 10

## 2020-09-04 NOTE — PLAN OF CARE
Mentation and level of alertness improving throughout the day,  Dex weaned to.2.  Frequent use of the urinal with incontinence/spillage.  No overt neuro deficits.

## 2020-09-04 NOTE — PROGRESS NOTES
09/04/20 1400   Quick Adds   Type of Visit Initial PT Evaluation   Living Environment   Lives With alone   Living Arrangements house   Home Accessibility stairs within home   Number of Stairs, Within Home, Primary other (see comments)  (one flight)   Stair Railings, Within Home, Primary railings safe and in good condition   Transportation Anticipated family or friend will provide   Living Environment Comment Pt lives in a single [place] house with daughter who works during the day. Pt's laundry is in the basement.   Self-Care   Usual Activity Tolerance good   Current Activity Tolerance fair   Regular Exercise No   Equipment Currently Used at Home none   Activity/Exercise/Self-Care Comment INd with all ADL's and iADL's   Functional Level Prior   Ambulation 0-->independent   Transferring 0-->independent   Toileting 0-->independent   Bathing 0-->independent   Communication 0-->understands/communicates without difficulty   Swallowing 0-->swallows foods/liquids without difficulty   Cognition 0 - no cognition issues reported   Fall history within last six months yes   Number of times patient has fallen within last six months 3   Which of the above functional risks had a recent onset or change? ambulation   Prior Functional Level Comment Pt was ind with ambulation.    General Information   Onset of Illness/Injury or Date of Surgery - Date 09/01/20   Referring Physician Bakari Snyder, DO    Patient/Family Goals Statement Get stronger   Pertinent History of Current Problem (include personal factors and/or comorbidities that impact the POC) Pt is a 70 yr old male admitted for alcohol withdrawal and fall. Pt found +ve for small R chronic subdural hematoma and small left acute on chronic subdural hematoma   Precautions/Limitations fall precautions   Weight-Bearing Status - LLE full weight-bearing   Weight-Bearing Status - RLE full weight-bearing   General Observations Cooperative, mildy confused   Cognitive Status  Examination   Orientation person;place   Level of Consciousness alert   Follows Commands and Answers Questions 75% of the time;able to follow single-step instructions   Personal Safety and Judgment at risk behaviors demonstrated;impaired   Memory impaired   Pain Assessment   Patient Currently in Pain No   Range of Motion (ROM)   ROM Comment BLE: WFL   Strength   Strength Comments BLE: grossly 4-/5   Bed Mobility   Bed Mobility Comments Supine<>sit: min assist x 1    Transfer Skills   Transfer Comments STS at min assist x 1    Gait   Gait Comments 5 ft using RW and min assist x 1    Balance   Balance Comments Sitting :good   Sensory Examination   Sensory Perception no deficits were identified   Coordination   Coordination no deficits were identified   Muscle Tone   Muscle Tone no deficits were identified   General Therapy Interventions   Planned Therapy Interventions balance training;bed mobility training;gait training;strengthening;transfer training;wheelchair management/propulsion training;risk factor education;home program guidelines;progressive activity/exercise   Clinical Impression   Criteria for Skilled Therapeutic Intervention yes, treatment indicated   PT Diagnosis Impaired gait   Influenced by the following impairments decreased balance, strength and activity tolerance   Functional limitations due to impairments assistance needed with mobility   Clinical Presentation Stable/Uncomplicated   Clinical Presentation Rationale clinical judgement   Clinical Decision Making (Complexity) Low complexity   Therapy Frequency Daily   Predicted Duration of Therapy Intervention (days/wks) 7   Anticipated Discharge Disposition Transitional Care Facility   Risk & Benefits of therapy have been explained Yes   Patient, Family & other staff in agreement with plan of care Yes   Clinical Impression Comments Pt presents with impaired balance, strength and activity toelrance limiting ind with fucntional mobilty. Pt will benefit  "from continued skilled PT to achieve PLOF.    [facility] AM-PAC TM \"6 Clicks\"   2016, Trustees of [facility], under license to [employer].  All rights reserved.   6 Clicks Short Forms Basic Mobility Inpatient Short Form   [facility]  \"6 Clicks\" V.2 Basic Mobility Inpatient Short Form   1. Turning from your back to your side while in a flat bed without using bedrails? 3 - A Little   2. Moving from lying on your back to sitting on the side of a flat bed without using bedrails? 3 - A Little   3. Moving to and from a bed to a chair (including a wheelchair)? 3 - A Little   4. Standing up from a chair using your arms (e.g., wheelchair, or bedside chair)? 3 - A Little   5. To walk in hospital room? 3 - A Little   6. Climbing 3-5 steps with a railing? 2 - A Lot   Basic Mobility Raw Score (Score out of 24.Lower scores equate to lower levels of function) 17   Total Evaluation Time   Total Evaluation Time (Minutes) 10     "

## 2020-09-04 NOTE — PLAN OF CARE
Discharge Planner PT     Patient plan for discharge: Home or TCU.    Current status: PT eval completed, treatment initiated. Pt is a 70 yr old male admitted for alcohol withdrawal and fall. Pt found +ve for small R chronic subdural hematoma and small left acute on chronic subdural hematoma.     Pt lives with daughter in a 1SH with no JUNG, bed and bath on the main floor, laundry in the basement. Daughter works during the day. At baseline, ind with gait, ADL's and IADL's.     Pt was received supine in bed and agreed to PT interventions. Pt required min assist x 1 with verbal cues for supine<>sit. Min assist x 1 with STS and ambulated 30 ft using RW and min assist x 1 for safety. Pt participated in seated and standing LE there ex with PT's guidance. Pt is left supine in bed with all needs within reach, alarm engaged and vitals stable. Vitals post PT: /97, HR: 82 bpm, O2 sats: 96% on room air.     Barriers to return to prior living situation: steps to manage at home, alone most of the times, impaired balance and strength, fall risk.    Recommendations for discharge: TCU    Rationale for recommendations: Pt is below usual functional baseline and will benefit from continued skilled PT to achieve PLOF an independence.          Entered by: Zenaida Tomas 09/04/2020 2:10 PM

## 2020-09-04 NOTE — PLAN OF CARE
Discharge Planner OT   Patient plan for discharge: Home, but seemed agreeable to rehab if needed  Current status: OT eval completed and treatment initiated. Pt was oriented to self, place but not time or exact situation. Lack of insight into condition-reports he is in the hospital due to nausea. He was pleasant and followed all directions. BUE with AROM WFL, mildly impaired BUE strength and coordination. Some difficulty tracking with both eyes; visual field intact. Noted some impulsivity with mobility. Supine to EOB with Min A. Demos very poor forward flexed and protracted posture. Bed to chair with Mod A and cues. Later stood with Min A and WW. Used urinal with Min A while seated. Incontinent of liquid stool; needed Max A to clean up and don brief. Assisted pt to order lunch. Up in chair with call light at end of session, RN said no need for chair alarm.  Barriers to return to prior living situation: impaired cognition, current level of assist, high falls risk, medical status  Recommendations for discharge: TCU  Rationale for recommendations: Pt does not appear safe to discharge home at this time. He is far below baseline and normally is (I) with ADL and mobility without AD. Pt would benefit from TCU stay to maximize (I) with ADL, IADL and mobility.       Entered by: Bety Shahid 09/04/2020 11:31 AM

## 2020-09-05 ENCOUNTER — APPOINTMENT (OUTPATIENT)
Dept: PHYSICAL THERAPY | Facility: CLINIC | Age: 70
DRG: 896 | End: 2020-09-05
Payer: COMMERCIAL

## 2020-09-05 LAB
GLUCOSE BLDC GLUCOMTR-MCNC: 126 MG/DL (ref 70–99)
MAGNESIUM SERPL-MCNC: 2 MG/DL (ref 1.6–2.3)
POTASSIUM SERPL-SCNC: 3.3 MMOL/L (ref 3.4–5.3)
POTASSIUM SERPL-SCNC: 3.8 MMOL/L (ref 3.4–5.3)

## 2020-09-05 PROCEDURE — 25000128 H RX IP 250 OP 636: Performed by: INTERNAL MEDICINE

## 2020-09-05 PROCEDURE — 00000146 ZZHCL STATISTIC GLUCOSE BY METER IP

## 2020-09-05 PROCEDURE — 36415 COLL VENOUS BLD VENIPUNCTURE: CPT | Performed by: INTERNAL MEDICINE

## 2020-09-05 PROCEDURE — 25000125 ZZHC RX 250: Performed by: NURSE PRACTITIONER

## 2020-09-05 PROCEDURE — 25000132 ZZH RX MED GY IP 250 OP 250 PS 637: Mod: GY | Performed by: INTERNAL MEDICINE

## 2020-09-05 PROCEDURE — 25800030 ZZH RX IP 258 OP 636: Performed by: NURSE PRACTITIONER

## 2020-09-05 PROCEDURE — 99233 SBSQ HOSP IP/OBS HIGH 50: CPT | Performed by: INTERNAL MEDICINE

## 2020-09-05 PROCEDURE — 84132 ASSAY OF SERUM POTASSIUM: CPT | Performed by: INTERNAL MEDICINE

## 2020-09-05 PROCEDURE — 20000003 ZZH R&B ICU

## 2020-09-05 PROCEDURE — 25000128 H RX IP 250 OP 636: Performed by: STUDENT IN AN ORGANIZED HEALTH CARE EDUCATION/TRAINING PROGRAM

## 2020-09-05 PROCEDURE — 25000132 ZZH RX MED GY IP 250 OP 250 PS 637: Mod: GY | Performed by: STUDENT IN AN ORGANIZED HEALTH CARE EDUCATION/TRAINING PROGRAM

## 2020-09-05 PROCEDURE — 97530 THERAPEUTIC ACTIVITIES: CPT | Mod: GP | Performed by: PHYSICAL THERAPIST

## 2020-09-05 PROCEDURE — 83735 ASSAY OF MAGNESIUM: CPT | Performed by: INTERNAL MEDICINE

## 2020-09-05 PROCEDURE — 97116 GAIT TRAINING THERAPY: CPT | Mod: GP | Performed by: PHYSICAL THERAPIST

## 2020-09-05 RX ORDER — HYDROMORPHONE HYDROCHLORIDE 2 MG/1
2-4 TABLET ORAL
Status: DISCONTINUED | OUTPATIENT
Start: 2020-09-05 | End: 2020-09-06

## 2020-09-05 RX ADMIN — MULTIPLE VITAMINS W/ MINERALS TAB 1 TABLET: TAB at 09:14

## 2020-09-05 RX ADMIN — POTASSIUM CHLORIDE 10 MEQ: 7.46 INJECTION, SOLUTION INTRAVENOUS at 05:47

## 2020-09-05 RX ADMIN — HYDROMORPHONE HYDROCHLORIDE 4 MG: 2 TABLET ORAL at 23:29

## 2020-09-05 RX ADMIN — METOPROLOL SUCCINATE 100 MG: 100 TABLET, EXTENDED RELEASE ORAL at 09:14

## 2020-09-05 RX ADMIN — VENLAFAXINE HYDROCHLORIDE 300 MG: 150 CAPSULE, EXTENDED RELEASE ORAL at 09:14

## 2020-09-05 RX ADMIN — POTASSIUM CHLORIDE 10 MEQ: 7.46 INJECTION, SOLUTION INTRAVENOUS at 02:40

## 2020-09-05 RX ADMIN — POTASSIUM CHLORIDE 10 MEQ: 7.46 INJECTION, SOLUTION INTRAVENOUS at 04:52

## 2020-09-05 RX ADMIN — HYDROMORPHONE HYDROCHLORIDE 2 MG: 2 TABLET ORAL at 14:22

## 2020-09-05 RX ADMIN — HYDROMORPHONE HYDROCHLORIDE 2 MG: 2 TABLET ORAL at 11:35

## 2020-09-05 RX ADMIN — LEVETIRACETAM 750 MG: 750 TABLET, EXTENDED RELEASE ORAL at 21:45

## 2020-09-05 RX ADMIN — THIAMINE HCL TAB 100 MG 100 MG: 100 TAB at 09:13

## 2020-09-05 RX ADMIN — LABETALOL HYDROCHLORIDE 20 MG: 5 INJECTION, SOLUTION INTRAVENOUS at 00:05

## 2020-09-05 RX ADMIN — PRAVASTATIN SODIUM 40 MG: 40 TABLET ORAL at 21:45

## 2020-09-05 RX ADMIN — LORAZEPAM 2 MG: 2 INJECTION INTRAMUSCULAR; INTRAVENOUS at 02:16

## 2020-09-05 RX ADMIN — POTASSIUM CHLORIDE 10 MEQ: 7.46 INJECTION, SOLUTION INTRAVENOUS at 03:46

## 2020-09-05 RX ADMIN — AMLODIPINE BESYLATE 5 MG: 5 TABLET ORAL at 09:14

## 2020-09-05 RX ADMIN — FOLIC ACID 1 MG: 1 TABLET ORAL at 09:14

## 2020-09-05 RX ADMIN — VALPROATE SODIUM 500 MG: 100 INJECTION, SOLUTION INTRAVENOUS at 00:35

## 2020-09-05 RX ADMIN — LORAZEPAM 2 MG: 2 INJECTION INTRAMUSCULAR; INTRAVENOUS at 01:01

## 2020-09-05 RX ADMIN — ACETAMINOPHEN 650 MG: 325 TABLET, FILM COATED ORAL at 09:14

## 2020-09-05 RX ADMIN — LORAZEPAM 2 MG: 2 INJECTION INTRAMUSCULAR; INTRAVENOUS at 00:24

## 2020-09-05 RX ADMIN — LABETALOL HYDROCHLORIDE 10 MG: 5 INJECTION, SOLUTION INTRAVENOUS at 18:03

## 2020-09-05 ASSESSMENT — ACTIVITIES OF DAILY LIVING (ADL)
ADLS_ACUITY_SCORE: 17
ADLS_ACUITY_SCORE: 15
ADLS_ACUITY_SCORE: 19
ADLS_ACUITY_SCORE: 19
ADLS_ACUITY_SCORE: 16
ADLS_ACUITY_SCORE: 19

## 2020-09-05 ASSESSMENT — MIFFLIN-ST. JEOR: SCORE: 1450.63

## 2020-09-05 NOTE — PLAN OF CARE
Pt remains confused otherwise neuros appear intact. Pt initially tolerated having precedex gtt off but required restarting d/t increased tremors,anxiety, restlessness without ability to redirect. Delirium score is +. SR and 1 prn med given d/t bp over goals. PP+ skin warm. BS within normal. Good appetite/PO intake. Abd soft. Voiding spontaneously but incontinent. All questions answered as able with 1:1 nursing for safety d/t pulling at IV's.

## 2020-09-05 NOTE — PLAN OF CARE
OT: attempted, pt sleeping upon arrival. RN states pt had a bad headache earlier and gave pain meds, pt would open eyes but not respond verbally at all to therapist. RN states pt has been up a lot today and will get to the chair again later.

## 2020-09-05 NOTE — PLAN OF CARE
Discharge Planner PT   Patient plan for discharge: Not stated     Current status: Pt appears slightly more restless and more painful today. RN present for session. Pt SBA for rolling, Estrellita for supine>sit. Pt transfers sit<>stand to FWW with min-modA. Pt ambulated 40' with FWW and Estrellita, w/c follow for safety. Pt impulsively sits at times. Following ~75% of commands, slow to follow at times. BP elevated post-activity, see VSFS, RN aware.    Barriers to return to prior living situation: steps to manage at home, alone most of the times, impaired balance and strength, fall risk, level of assist, pain     Recommendations for discharge: TCU     Rationale for recommendations: Pt is below baseline and would benefit from continued PT while IP and at TCU to improve functional strength, balance, activity tolerance, safety and IND with functional mobility and reduce falls risk prior to returning home.       Entered by: Tamera Rojas 09/05/2020 11:36 AM

## 2020-09-05 NOTE — PROGRESS NOTES
[facility]  Hospitalist Progress Note    Admit Date:  8/31/2020  Date of Service (when I saw the patient): 09/05/2020   Provider:  Gabby Arias, DO    Assessment & Plan   Jong Galarza is a 70 year old male who was admitted on 8/31/2020. He has a history of HTN, HLP, anxiety and depression who presented to the ED on 8/31/2020 with concerns for alcohol withdrawal.  Work up in the ED though showed bilateral subdural hematomas with a 3 mm shift     Problem List:    1. Delirium Tremens  Alcohol abuse with concerns for withdrawal   Patient supposedly drinks 0.5 L of vodka daily with his last drink a day prior to arrival in the ED.  Has been tremulous in the ED with N/V and dizziness recently.  Did require Ativan x2 in the ED for withdrawal symptoms. Still had DTs despite CIWA protocol, precedex gtt started by critical care service with improvement in agitation.     Precedex off for a bit yesterdy, but then was restarted overnight.  D/w RN - will continue precedex infusion for now and continue to monitor closely  - CIWA protocol continues with PRN Ativan   - PO multivitamins   - Needs CD or psychiatry evaluation once withdrawals improve and out of ICU     2. Bilateral subdural hematomas   While in the ED the patient reported recent falls with the most recent one likely yesterday.  CT scan of the head was done and showed bilateral subdural hematomas with a 3 mm left to right midline shift.  Also there was a probable tiny amount of acute subdural hematoma on the right tentorium.  Neurosurgery was made aware in the ED and recommended admission to the ICU for further monitoring. Repeat CT head shows Stable hypodense subdural collections along the cerebral convexities on both sides with interval dissipation of the tiny focus of hyperdense hemorrhage noted along the lateral left temporal lobe since the comparison study.    - Neurosurgery consulted and appreciate their recommendations -> no  "further intervention and have now signed off    Neurosurgery recs 4 wks f/u CTscan    Has completed rec 3 doses of IV valproic acid - ok to discontinue today    3. HTN   HLP   - Will resume PTA norvasc, metoprolol and pravachol     4. Anxiety/Depression   - resume PTA effexor, keppra  Currently holding seroquel tid and wellbutrin xl      5.  Hypokalemia  Replacing IV per protocol  Will check magnesium this am    Diet: Advance Diet as Tolerated: Regular Diet Adult    DVT Prophylaxis: Pneumatic Compression Devices  Forman Catheter: not present  Code Status: Full Code           Diet: Advance Diet as Tolerated: Regular Diet Adult    DVT Prophylaxis: Pneumatic Compression Devices  Forman Catheter: not present  Code Status: Full Code      Disposition Plan   Expected discharge: 2 - 3 days, recommended to possible TCU once off precedex gtt and throught etoh withdrawls.  Entered: Gabby Arias, DO 09/05/2020, 7:14 AM       The patient's care was discussed with the Bedside Nurse and Patient.    Interval History   \"Leave me alone\".  \"I broke my left elbow and fell off of a roof\".  No CP or SOB.  No N/V, F/C.  Was tremulous and agitated overnight - precedex restarted.  Appetite ok.    -Data reviewed today: I reviewed all new labs and imaging results over the last 24 hours. I personally reviewed no images or EKG's today.    Physical Exam   Temp: 98.2  F (36.8  C) Temp src: Oral BP: (!) 127/119 Pulse: 74   Resp: 24 SpO2: 96 % O2 Device: None (Room air)    Vitals:    09/03/20 0300 09/04/20 0100 09/05/20 0200   Weight: 73.8 kg (162 lb 11.2 oz) 74.1 kg (163 lb 5.8 oz) 72.7 kg (160 lb 4.4 oz)     Vital Signs with Ranges  Temp:  [98  F (36.7  C)-98.4  F (36.9  C)] 98.2  F (36.8  C)  Pulse:  [] 74  Resp:  [10-31] 24  BP: ()/() 127/119  SpO2:  [92 %-98 %] 96 %  I/O last 3 completed shifts:  In: 2298.51 [P.O.:1140; I.V.:1158.51]  Out: 2140 [Urine:2140]    GEN:  Sleepy but arousable, unkempt, agitated and " "swearing at times  HEENT:  Normocephalic/atraumatic, no clear scleral icterus, no nasal discharge, PERRL, mouth and membranes fairly moist, no oral ulcers or thrush noted.  NECK:  No clear thyromegaly of clear JVD  CV:  Regular rate and rhythm, no loud murmur to ausc.  S1 + S2 noted, no S3 or S4.  LUNGS:  Clear to auscultation ant/lat/post bilaterally - somewhat diminished on the right base.  No rales/rhonchi/wheezing auscultated bilaterally.  No costal retractions bilaterally.  Symmetric chest rise on inhalation noted.  ABD:  Active bowel sounds, soft, non-tender/not really distended.  No clear rebound/guarding/rigidity.  No masses palpated.  No obvious HSM to somewhat limited exam.  EXT:  No pretibial edema or cyanosis bilaterally. No joint synovitis noted.  No calf-tenderness or asymmetry noted.  SKIN:  Dry to touch, no rashes or jaundice noted. Multiple bruises noted to the back/flank.  PSYCH:  Mood overall calm, intermittently agitated. Not tearful or depressed.  avoids direct eye contact.  NEURO:  Intermittent upper ext tremors at rest.  Speech clear and appropriate; orientated only clearly to \"hospital\".  Moving all ext grossly without clear deficits.    Data   Labs:  Recent Labs   Lab 09/05/20  0104 09/04/20  1553 09/04/20  0518  09/03/20 0447 09/02/20  1112   NA  --   --  136  --  136 141   POTASSIUM 3.3* 3.0* 3.1*   < > 2.7* 3.0*   CHLORIDE  --   --  99  --  99 103   CO2  --   --  32  --  30 31   ANIONGAP  --   --  5  --  7 7   GLC  --   --  93  --  91 89   BUN  --   --  9  --  11 17   CR  --   --  0.62*  --  0.65* 0.66   GFRESTIMATED  --   --  >90  --  >90 >90   GFRESTBLACK  --   --  >90  --  >90 >90   BLANCA  --   --  8.9  --  8.2* 8.3*    < > = values in this interval not displayed.     Recent Labs   Lab 09/03/20 0447 09/02/20  1112 08/31/20  1944   WBC 5.4 4.9 11.6*   HGB 13.4 13.3 13.9   HCT 38.8* 38.5* 39.9*   MCV 97 98 97   PLT 75* 85* 122*     No results for input(s): CKT in the last 168 " hours.    Invalid input(s): CK, CK TOTAL  Recent Labs   Lab 09/02/20  1112 08/31/20  1944   * 269*   ALT 87* 146*   ALKPHOS 93 129   BILITOTAL 1.4* 2.2*      Magnesium - pending    Recent Imaging:   No results found for this or any previous visit (from the past 24 hour(s)).    Medications     dexmedetomidine 0.5 mcg/kg/hr (09/05/20 0659)     lactated ringers 10 mL/hr at 09/05/20 0034       amLODIPine  5 mg Oral Daily     folic acid  1 mg Oral Daily     levETIRAcetam  750 mg Oral At Bedtime     metoprolol succinate ER  100 mg Oral Daily     multivitamin w/minerals  1 tablet Oral Daily     pravastatin  40 mg Oral At Bedtime     thiamine  100 mg Oral Daily     valproate (DEPACON) intermittent infusion  500 mg Intravenous Q8H     venlafaxine  300 mg Oral Daily

## 2020-09-05 NOTE — PLAN OF CARE
Pt is lethargic and confused to place and situation. Patient c/o of HA 5/10.   CIWA 5 Dex remains off.   Tele. NSR. SBP goal is 100-160. PRN labetalol given. CTM  RA. Deep breath and cough encouraged.   Regular diet.  Voided via commode.  Up to chair x 2 assist walker/gait belt.

## 2020-09-05 NOTE — PLAN OF CARE
Periodically on and off low dose precedex, Intermittent h/a , gen.  body aches, thigh pain relief with tylenol and hydrocodone 2 mg.  Rise in bp with activity, inc. Of urine x2.  Speech clear but illogical at times.  Afebrile, eating 1/4 of diet, lots of bruised skin, brother and dtr updated.

## 2020-09-06 LAB
ALBUMIN SERPL-MCNC: 3.4 G/DL (ref 3.4–5)
ALP SERPL-CCNC: 91 U/L (ref 40–150)
ALT SERPL W P-5'-P-CCNC: 63 U/L (ref 0–70)
ANION GAP SERPL CALCULATED.3IONS-SCNC: 5 MMOL/L (ref 3–14)
AST SERPL W P-5'-P-CCNC: 39 U/L (ref 0–45)
BASOPHILS # BLD AUTO: 0 10E9/L (ref 0–0.2)
BASOPHILS NFR BLD AUTO: 0 %
BILIRUB SERPL-MCNC: 1.3 MG/DL (ref 0.2–1.3)
BUN SERPL-MCNC: 10 MG/DL (ref 7–30)
CALCIUM SERPL-MCNC: 8.7 MG/DL (ref 8.5–10.1)
CHLORIDE SERPL-SCNC: 97 MMOL/L (ref 94–109)
CO2 SERPL-SCNC: 28 MMOL/L (ref 20–32)
CREAT SERPL-MCNC: 0.61 MG/DL (ref 0.66–1.25)
DIFFERENTIAL METHOD BLD: ABNORMAL
EOSINOPHIL # BLD AUTO: 0.1 10E9/L (ref 0–0.7)
EOSINOPHIL NFR BLD AUTO: 1.4 %
ERYTHROCYTE [DISTWIDTH] IN BLOOD BY AUTOMATED COUNT: 12.9 % (ref 10–15)
GFR SERPL CREATININE-BSD FRML MDRD: >90 ML/MIN/{1.73_M2}
GLUCOSE SERPL-MCNC: 117 MG/DL (ref 70–99)
HCT VFR BLD AUTO: 42.8 % (ref 40–53)
HGB BLD-MCNC: 14.8 G/DL (ref 13.3–17.7)
IMM GRANULOCYTES # BLD: 0.1 10E9/L (ref 0–0.4)
IMM GRANULOCYTES NFR BLD: 0.7 %
LYMPHOCYTES # BLD AUTO: 1 10E9/L (ref 0.8–5.3)
LYMPHOCYTES NFR BLD AUTO: 11.1 %
MCH RBC QN AUTO: 33.3 PG (ref 26.5–33)
MCHC RBC AUTO-ENTMCNC: 34.6 G/DL (ref 31.5–36.5)
MCV RBC AUTO: 96 FL (ref 78–100)
MONOCYTES # BLD AUTO: 1.2 10E9/L (ref 0–1.3)
MONOCYTES NFR BLD AUTO: 14.2 %
NEUTROPHILS # BLD AUTO: 6.4 10E9/L (ref 1.6–8.3)
NEUTROPHILS NFR BLD AUTO: 72.6 %
NRBC # BLD AUTO: 0 10*3/UL
NRBC BLD AUTO-RTO: 0 /100
PLATELET # BLD AUTO: 158 10E9/L (ref 150–450)
POTASSIUM SERPL-SCNC: 3.5 MMOL/L (ref 3.4–5.3)
PROT SERPL-MCNC: 6.7 G/DL (ref 6.8–8.8)
RBC # BLD AUTO: 4.45 10E12/L (ref 4.4–5.9)
SODIUM SERPL-SCNC: 130 MMOL/L (ref 133–144)
SODIUM SERPL-SCNC: 132 MMOL/L (ref 133–144)
WBC # BLD AUTO: 8.8 10E9/L (ref 4–11)

## 2020-09-06 PROCEDURE — 84295 ASSAY OF SERUM SODIUM: CPT | Performed by: INTERNAL MEDICINE

## 2020-09-06 PROCEDURE — 99233 SBSQ HOSP IP/OBS HIGH 50: CPT | Performed by: INTERNAL MEDICINE

## 2020-09-06 PROCEDURE — 25000128 H RX IP 250 OP 636: Performed by: INTERNAL MEDICINE

## 2020-09-06 PROCEDURE — 25000132 ZZH RX MED GY IP 250 OP 250 PS 637: Mod: GY | Performed by: STUDENT IN AN ORGANIZED HEALTH CARE EDUCATION/TRAINING PROGRAM

## 2020-09-06 PROCEDURE — 36415 COLL VENOUS BLD VENIPUNCTURE: CPT | Performed by: INTERNAL MEDICINE

## 2020-09-06 PROCEDURE — 25000132 ZZH RX MED GY IP 250 OP 250 PS 637: Mod: GY | Performed by: INTERNAL MEDICINE

## 2020-09-06 PROCEDURE — 85025 COMPLETE CBC W/AUTO DIFF WBC: CPT | Performed by: INTERNAL MEDICINE

## 2020-09-06 PROCEDURE — 20000003 ZZH R&B ICU

## 2020-09-06 PROCEDURE — 80053 COMPREHEN METABOLIC PANEL: CPT | Performed by: INTERNAL MEDICINE

## 2020-09-06 RX ORDER — MECOBALAMIN 5000 MCG
15 TABLET,DISINTEGRATING ORAL DAILY PRN
COMMUNITY

## 2020-09-06 RX ORDER — DIPHENHYDRAMINE HCL 25 MG
25 TABLET ORAL EVERY 6 HOURS PRN
COMMUNITY

## 2020-09-06 RX ORDER — ACETAMINOPHEN 325 MG/1
650 TABLET ORAL EVERY 4 HOURS PRN
Status: DISCONTINUED | OUTPATIENT
Start: 2020-09-06 | End: 2020-09-09 | Stop reason: HOSPADM

## 2020-09-06 RX ORDER — TRAZODONE HYDROCHLORIDE 100 MG/1
200-300 TABLET ORAL
Status: ON HOLD | COMMUNITY
End: 2020-09-09

## 2020-09-06 RX ORDER — HYDROMORPHONE HYDROCHLORIDE 2 MG/1
2-4 TABLET ORAL EVERY 4 HOURS PRN
Status: DISCONTINUED | OUTPATIENT
Start: 2020-09-06 | End: 2020-09-09 | Stop reason: HOSPADM

## 2020-09-06 RX ADMIN — ACETAMINOPHEN 650 MG: 325 TABLET, FILM COATED ORAL at 07:52

## 2020-09-06 RX ADMIN — LABETALOL HYDROCHLORIDE 10 MG: 5 INJECTION, SOLUTION INTRAVENOUS at 05:07

## 2020-09-06 RX ADMIN — MULTIPLE VITAMINS W/ MINERALS TAB 1 TABLET: TAB at 08:31

## 2020-09-06 RX ADMIN — AMLODIPINE BESYLATE 5 MG: 5 TABLET ORAL at 08:31

## 2020-09-06 RX ADMIN — ACETAMINOPHEN 650 MG: 325 TABLET, FILM COATED ORAL at 19:50

## 2020-09-06 RX ADMIN — LORAZEPAM 1 MG: 1 TABLET ORAL at 21:08

## 2020-09-06 RX ADMIN — HYDROMORPHONE HYDROCHLORIDE 2 MG: 2 TABLET ORAL at 13:57

## 2020-09-06 RX ADMIN — LABETALOL HYDROCHLORIDE 10 MG: 5 INJECTION, SOLUTION INTRAVENOUS at 01:27

## 2020-09-06 RX ADMIN — ACETAMINOPHEN 650 MG: 325 TABLET, FILM COATED ORAL at 13:43

## 2020-09-06 RX ADMIN — METOPROLOL SUCCINATE 100 MG: 100 TABLET, EXTENDED RELEASE ORAL at 08:31

## 2020-09-06 RX ADMIN — HYDROMORPHONE HYDROCHLORIDE 2 MG: 2 TABLET ORAL at 19:50

## 2020-09-06 RX ADMIN — LABETALOL HYDROCHLORIDE 10 MG: 5 INJECTION, SOLUTION INTRAVENOUS at 00:12

## 2020-09-06 RX ADMIN — PRAVASTATIN SODIUM 40 MG: 40 TABLET ORAL at 21:08

## 2020-09-06 RX ADMIN — LEVETIRACETAM 750 MG: 750 TABLET, EXTENDED RELEASE ORAL at 21:08

## 2020-09-06 RX ADMIN — VENLAFAXINE HYDROCHLORIDE 300 MG: 150 CAPSULE, EXTENDED RELEASE ORAL at 08:31

## 2020-09-06 RX ADMIN — FOLIC ACID 1 MG: 1 TABLET ORAL at 08:31

## 2020-09-06 ASSESSMENT — VISUAL ACUITY: OU: BASELINE

## 2020-09-06 ASSESSMENT — ACTIVITIES OF DAILY LIVING (ADL)
ADLS_ACUITY_SCORE: 17
ADLS_ACUITY_SCORE: 13
ADLS_ACUITY_SCORE: 17
ADLS_ACUITY_SCORE: 16

## 2020-09-06 ASSESSMENT — MIFFLIN-ST. JEOR: SCORE: 1456.63

## 2020-09-06 NOTE — PROGRESS NOTES
[facility]  Hospitalist Progress Note    Admit Date:  8/31/2020  Date of Service (when I saw the patient): 09/06/2020   Provider:  Gabby Arias, DO    Assessment & Plan   Jong Galarza is a 70 year old male who was admitted on 8/31/2020. He has a history of HTN, HLP, anxiety and depression who presented to the ED on 8/31/2020 with concerns for alcohol withdrawal.  Work up in the ED though showed bilateral subdural hematomas with a 3 mm shift, stable on repeat imaging.  Has remained in ICU for precedex gtt and alcohol withdrawal syndrome    Problem List:    1. Delirium Tremens  Alcohol abuse with concerns for withdrawal   Patient supposedly drinks 0.5 L of vodka daily with his last drink a day prior to arrival in the ED.  Has been tremulous in the ED with N/V and dizziness recently.  Did require Ativan x2 in the ED for withdrawal symptoms. Still had DTs despite CIWA protocol, precedex gtt started by critical care service with improvement in agitation.     Precedex now off since 1344 yesterday  No prn ativan needed  Impulsive so will continue 1:1  D/w bedside RN - will transfer to medical floor today    - Needs CD or psychiatry evaluation - family states has been in detox before     2. Bilateral subdural hematomas   While in the ED the patient reported recent falls with the most recent one day prior to admission  CT scan of the head was done and showed bilateral subdural hematomas with a 3 mm left to right midline shift.  Also there was a probable tiny amount of acute subdural hematoma on the right tentorium.    Neurosurgery recommended admission to the ICU for further monitoring. Repeat CT head shows Stable hypodense subdural collections along the cerebral convexities on both sides with interval dissipation of the tiny focus of hyperdense hemorrhage noted along the lateral left temporal lobe since the comparison study.    - Neurosurgery consulted and appreciate their  "recommendations -> no further intervention and have now signed off    Neurosurgery recs 4 wks f/u CTscan    Has completed rec 3 doses of IV valproic acid - ok to discontinue today    3.  Hyponatremia  New since 9/4/20  Will recheck this afternoon  May need to restart IVF as poor po intake overnight    4.  HTN   HLP   - Will resume PTA norvasc, metoprolol and pravachol  SBP improved, overall     5. Anxiety/Depression   - resume PTA effexor, keppra  Currently holding seroquel tid and wellbutrin xl, for now     6.   Hypokalemia  Replacing IV per protocol  Will check magnesium this am    7.  Back rash  H/o eczema  Appears to be more heat-related on my exam.  Will get up out of bed and continue to monitor    Disposition - h/o leaving AMA to return home alone and drink (he does not live with his daughter, as he keeps saying).  Has been in detox before.    Diet: Advance Diet as Tolerated: Regular Diet Adult    DVT Prophylaxis: Pneumatic Compression Devices  Forman Catheter: not present  Code Status: Full Code           Diet: Advance Diet as Tolerated: Regular Diet Adult    DVT Prophylaxis: Pneumatic Compression Devices  Forman Catheter: not present  Code Status: Full Code      Disposition Plan   Expected discharge:several days  Entered: Gabby Arias DO 09/06/2020, 12:55 PM       The patient's care was discussed with the Bedside Nurse and Patient.    Interval History   \"I don't have a subdural hematoma\".  Mild HA, no CP or SOB.  States that he has eczema.  Awaiting breakfast.  More sleepy this am.  \"I am ready to go home\".   Recent fell off the roof ?month ago.  \"I am not answering your questions\".    -Data reviewed today: I reviewed all new labs and imaging results over the last 24 hours. I personally reviewed no images or EKG's today.    Physical Exam   Temp: 98.5  F (36.9  C) Temp src: Oral BP: 132/78 Pulse: 63   Resp: 13 SpO2: 94 % O2 Device: None (Room air)    Vitals:    09/04/20 0100 09/05/20 0200 09/06/20 " 0505   Weight: 74.1 kg (163 lb 5.8 oz) 72.7 kg (160 lb 4.4 oz) 73.3 kg (161 lb 9.6 oz)     Vital Signs with Ranges  Temp:  [98.1  F (36.7  C)-98.7  F (37.1  C)] 98.5  F (36.9  C)  Pulse:  [56-99] 63  Resp:  [0-40] 13  BP: (121-192)/() 132/78  SpO2:  [93 %-98 %] 94 %  I/O last 3 completed shifts:  In: 1108.37 [P.O.:1030; I.V.:78.37]  Out: 875 [Urine:875]    GEN:  Sleepy but arousable, unkempt, speech slow, opening eyes and moving in bed with min help  HEENT:  Normocephalic/atraumatic, no clear scleral icterus, no nasal discharge, PERRL, mouth and membranes fairly moist  NECK:  No clear thyromegaly of clear JVD  CV:  Regular rate and rhythm, no loud murmur to ausc.  S1 + S2 noted, no S3 or S4.  LUNGS:  Clear to auscultation ant/lat bilaterally - somewhat diminished on the right base.  No rales/rhonchi/wheezing auscultated bilaterally.  No costal retractions bilaterally.  Symmetric chest rise on inhalation noted.  ABD:  Active bowel sounds, soft, non-tender, mildly distended.  No clear rebound/guarding/rigidity.  No masses palpated.  No obvious HSM to somewhat limited exam.  EXT:  No pretibial edema or cyanosis bilaterally.   SKIN:  Dry to touch, no  jaundice noted. Multiple bruises noted to the back/flank. Erythematous,diffuse mildly palpable rash on back - not seen on chest, abd, arms.  Left-sided flank extensive bruising and scattered bruising on forearms.  PSYCH:  Mood fairly calm, intermittently agitated. Not tearful or depressed.  avoids direct eye contact.  NEURO:  No tremors noted today.  Speech clear and appropriate; orientation unclear.  Moving all ext grossly without clear deficits.    Data   Labs:  Recent Labs   Lab 09/06/20  0556 09/05/20  1225 09/05/20  0104  09/04/20  0518  09/03/20  0447   *  --   --   --  136  --  136   POTASSIUM 3.5 3.8 3.3*   < > 3.1*   < > 2.7*   CHLORIDE 97  --   --   --  99  --  99   CO2 28  --   --   --  32  --  30   ANIONGAP 5  --   --   --  5  --  7   *  --    --   --  93  --  91   BUN 10  --   --   --  9  --  11   CR 0.61*  --   --   --  0.62*  --  0.65*   GFRESTIMATED >90  --   --   --  >90  --  >90   GFRESTBLACK >90  --   --   --  >90  --  >90   BLANCA 8.7  --   --   --  8.9  --  8.2*    < > = values in this interval not displayed.     Recent Labs   Lab 09/06/20  0556 09/03/20  0447 09/02/20  1112   WBC 8.8 5.4 4.9   HGB 14.8 13.4 13.3   HCT 42.8 38.8* 38.5*   MCV 96 97 98    75* 85*     No results for input(s): CKT in the last 168 hours.    Invalid input(s): CK, CK TOTAL  Recent Labs   Lab 09/06/20  0556 09/02/20  1112 08/31/20  1944   AST 39 100* 269*   ALT 63 87* 146*   ALKPHOS 91 93 129   BILITOTAL 1.3 1.4* 2.2*      Magnesium - pending    Recent Imaging:   No results found for this or any previous visit (from the past 24 hour(s)).    Medications       amLODIPine  5 mg Oral Daily     folic acid  1 mg Oral Daily     levETIRAcetam  750 mg Oral At Bedtime     metoprolol succinate ER  100 mg Oral Daily     multivitamin w/minerals  1 tablet Oral Daily     pravastatin  40 mg Oral At Bedtime     sodium chloride (PF)  3 mL Intracatheter Q8H     venlafaxine  300 mg Oral Daily

## 2020-09-06 NOTE — PLAN OF CARE
Pt is lethargic easily arousal,confused to time and situation, able to verbalized his needs, otherwise neuros appear intact.  Redirectable and cooperative with cares. Reports generalized pain was medicated per MAR with relief. Up to chair and bedside commode with assistance of two gain belt and walker. Positioning in bed independently. Slept on and off over night, restless and agitated from time to time.Sitter at bed side.  CIWA 4-5. No PRN Ativan given,   Tele. NSR. VSS, SBP goal is . Labetalol given x 3 for BP > 170.   LSC, on RA. Deep breath and cough encouraged.   GI/: Tolerate regular diet. Voided adequate amount via urinal. No BM this shift.  PT/OT work with patient.  Plan:  Precedex gtt stopped 9/5, possible step down level of care if patient stable.

## 2020-09-06 NOTE — PHARMACY-ADMISSION MEDICATION HISTORY
Pharmacy Medication History  Admission medication history interview status for the 8/31/2020  admission is complete. See EPIC admission navigator for prior to admission medications     Medication history sources: Surescripts and Rx bottles family brought in  Medication history source reliability: Poor  Adherence assessment: Unable to determine    Significant changes made to the medication list:  -Added medications based on pharmacy Rx fill records and Rx bottles brought in by family.    Additional medication history information:   Rx bottles family brought in included:  -Acetaminophen 325mg tab 6/17/20 #60 with lots of tablets  -Amlodipine 5mg filled 8/7/20 #90  -Bupropion XL 150mg tab bottles filled 10/26/19 for #90 and 4/27/20 for #90 both bottles still had lot of tablets. Rx also filled 7/21/20 #90 but bottle not with belongings.  -Keppra XR 750mg filled 6/9/20 for #90 - about 2/3 gone  -Loperamide 2mg capsule 6/17/20 #30 - empty bottle  -lorazepam 1mg tab filled 7/7/20 #120 tab - bottle had 13 tablets  -metoprolol ER 100mg filled 4/26/20 #90 - empty bottle, also filled 7/3/20 #90 but bottle not with belongings.  -pravastatin 40mg tab filled 2/5/20 for #90 - about 25 tabs, filled 8/5/20 #90 - nearly full.  -quetiapine 25mg 1/29/20 #270 had lots of tabs, 5/9/20 #270 had lots of tabs, also filled 8/1/20 - bottle not with belongings.  -venlafaxine ER 150mg filled 6/20/20 #180 - about 2/3 gone.  -Trazodone 100mg filled 7/6/19 had 4 tabs, filled 10/8/19 #270 nearly full bottle.  -Diphenhydramine 25mg OTC bottle - had some in it.  -Lansoprazole 15mg OTC bottle had some in it.    -Based on above info: he may not be currently taking bupropion, quetiapine, may not regularly take trazodone & appears somewhat compliant with pravastatin.      Medication reconciliation completed by provider prior to medication history? Yes    Time spent in this activity: 20 min      Prior to Admission medications    Medication Sig Last Dose  Taking? Auth Provider   acetaminophen (TYLENOL) 325 MG tablet Take 650 mg by mouth every 4 hours as needed for mild pain  Yes Unknown, Entered By History   amLODIPine (NORVASC) 5 MG tablet Take 5 mg by mouth daily  Yes Unknown, Entered By History   levETIRAcetam (KEPPRA XR) 750 MG 24 hr tablet Take 750 mg by mouth At Bedtime   Yes Unknown, Entered By History   metoprolol succinate ER (TOPROL-XL) 100 MG 24 hr tablet Take 100 mg by mouth daily  Yes Unknown, Entered By History   pravastatin (PRAVACHOL) 40 MG tablet Take 40 mg by mouth At Bedtime  Yes Unknown, Entered By History   QUEtiapine (SEROQUEL) 25 MG tablet Take 25 mg by mouth 3 times daily as needed  Yes Unknown, Entered By History   venlafaxine (EFFEXOR-XR) 150 MG 24 hr capsule Take 300 mg by mouth daily  Yes Unknown, Entered By History   buPROPion (WELLBUTRIN XL) 150 MG 24 hr tablet Take 150 mg by mouth every morning Unknown at Unknown time  Unknown, Entered By History   diphenhydrAMINE (BENADRYL) 25 MG tablet Take 25 mg by mouth every 6 hours as needed for itching or allergies Unknown at Unknown time  Unknown, Entered By History   LANsoprazole (PREVACID) 15 MG DR capsule Take 15 mg by mouth daily as needed Unknown at Unknown time  Unknown, Entered By History   loperamide (IMODIUM) 2 MG capsule Take 2 mg by mouth every 3 hours as needed for diarrhea   Unknown, Entered By History   LORazepam (ATIVAN PO) Take 1 mg by mouth every 6 hours as needed for anxiety    Reported, Patient   multivitamin, therapeutic (THERA-VIT) TABS tablet Take 1 tablet by mouth daily   [place] Daily MD   traZODone (DESYREL) 100 MG tablet Take 200-300 mg by mouth nightly as needed for sleep Unknown at Unknown time  Unknown, Entered By History

## 2020-09-06 NOTE — PLAN OF CARE
Oriented x2, more lucid, no confused conversation.general body aches and h/a relieved with tylenol and hydrocodone po.  CWA 8-9 but pt lethargic between cares.  Afebrile, SBP WNL, voided once,noted na 130 recheck pending.

## 2020-09-07 LAB
ANION GAP SERPL CALCULATED.3IONS-SCNC: 5 MMOL/L (ref 3–14)
BUN SERPL-MCNC: 17 MG/DL (ref 7–30)
CALCIUM SERPL-MCNC: 8.4 MG/DL (ref 8.5–10.1)
CHLORIDE SERPL-SCNC: 98 MMOL/L (ref 94–109)
CO2 SERPL-SCNC: 29 MMOL/L (ref 20–32)
CREAT SERPL-MCNC: 0.63 MG/DL (ref 0.66–1.25)
GFR SERPL CREATININE-BSD FRML MDRD: >90 ML/MIN/{1.73_M2}
GLUCOSE SERPL-MCNC: 101 MG/DL (ref 70–99)
POTASSIUM SERPL-SCNC: 3.5 MMOL/L (ref 3.4–5.3)
SODIUM SERPL-SCNC: 132 MMOL/L (ref 133–144)

## 2020-09-07 PROCEDURE — 12000000 ZZH R&B MED SURG/OB

## 2020-09-07 PROCEDURE — 25000132 ZZH RX MED GY IP 250 OP 250 PS 637: Mod: GY | Performed by: INTERNAL MEDICINE

## 2020-09-07 PROCEDURE — 36415 COLL VENOUS BLD VENIPUNCTURE: CPT | Performed by: INTERNAL MEDICINE

## 2020-09-07 PROCEDURE — 25000132 ZZH RX MED GY IP 250 OP 250 PS 637: Mod: GY | Performed by: STUDENT IN AN ORGANIZED HEALTH CARE EDUCATION/TRAINING PROGRAM

## 2020-09-07 PROCEDURE — 80048 BASIC METABOLIC PNL TOTAL CA: CPT | Performed by: INTERNAL MEDICINE

## 2020-09-07 PROCEDURE — 99233 SBSQ HOSP IP/OBS HIGH 50: CPT | Performed by: INTERNAL MEDICINE

## 2020-09-07 RX ADMIN — VENLAFAXINE HYDROCHLORIDE 300 MG: 150 CAPSULE, EXTENDED RELEASE ORAL at 08:05

## 2020-09-07 RX ADMIN — FOLIC ACID 1 MG: 1 TABLET ORAL at 08:05

## 2020-09-07 RX ADMIN — MULTIPLE VITAMINS W/ MINERALS TAB 1 TABLET: TAB at 08:05

## 2020-09-07 RX ADMIN — HYDROMORPHONE HYDROCHLORIDE 2 MG: 2 TABLET ORAL at 21:09

## 2020-09-07 RX ADMIN — AMLODIPINE BESYLATE 5 MG: 5 TABLET ORAL at 08:05

## 2020-09-07 RX ADMIN — HYDROMORPHONE HYDROCHLORIDE 2 MG: 2 TABLET ORAL at 12:37

## 2020-09-07 RX ADMIN — ACETAMINOPHEN 650 MG: 325 TABLET, FILM COATED ORAL at 12:37

## 2020-09-07 RX ADMIN — ACETAMINOPHEN 650 MG: 325 TABLET, FILM COATED ORAL at 16:12

## 2020-09-07 RX ADMIN — HYDROMORPHONE HYDROCHLORIDE 2 MG: 2 TABLET ORAL at 16:12

## 2020-09-07 RX ADMIN — ACETAMINOPHEN 650 MG: 325 TABLET, FILM COATED ORAL at 23:57

## 2020-09-07 RX ADMIN — METOPROLOL SUCCINATE 100 MG: 100 TABLET, EXTENDED RELEASE ORAL at 08:05

## 2020-09-07 RX ADMIN — LEVETIRACETAM 750 MG: 750 TABLET, EXTENDED RELEASE ORAL at 21:09

## 2020-09-07 RX ADMIN — PRAVASTATIN SODIUM 40 MG: 40 TABLET ORAL at 21:09

## 2020-09-07 ASSESSMENT — ACTIVITIES OF DAILY LIVING (ADL)
ADLS_ACUITY_SCORE: 17
ADLS_ACUITY_SCORE: 16
ADLS_ACUITY_SCORE: 17
ADLS_ACUITY_SCORE: 16
ADLS_ACUITY_SCORE: 17
ADLS_ACUITY_SCORE: 16

## 2020-09-07 ASSESSMENT — MIFFLIN-ST. JEOR: SCORE: 1461.63

## 2020-09-07 NOTE — PROGRESS NOTES
[facility]  Hospitalist Progress Note    Admit Date:  8/31/2020  Date of Service (when I saw the patient): 09/07/2020   Provider: Meg Tomas MD      Assessment & Plan   Jong Galarza is a 70 year old male who was admitted on 8/31/2020. He has a history of HTN, HLP, anxiety and depression who presented to the ED on 8/31/2020 with concerns for alcohol withdrawal.  Work up in the ED though showed bilateral subdural hematomas with a 3 mm shift, stable on repeat imaging.  Has remained in ICU for precedex gtt and alcohol withdrawal syndrome, Precedex gtt. has been off since then 9/5, plan to transition patient to medical telemetry floor.    Problem List:    1. Delirium Tremens  Alcohol abuse with concerns for withdrawal   Patient supposedly drinks 0.5 L of vodka daily with his last drink a day prior to arrival in the ED.  Has been tremulous in the ED with N/V and dizziness recently.  Did require Ativan x2 in the ED for withdrawal symptoms. Still had DTs despite CIWA protocol, precedex gtt started by critical care service with improvement in agitation.     Precedex now off since 9/5  No prn ativan needed  Patient is alert oriented x3 today and he is planning to continue his addiction treatment outpatient with his addiction psychiatrist on discharge.  -It seems that patient lives alone but he did mention to me that he lives with his daughter, will request psychiatric consult while inpatient.  -Patient appears to be quite week, will get a PT OT evaluate him to decide on disposition.  -He received IV valproic acid and has been discontinued since 9/6.  2. Bilateral subdural hematomas   While in the ED the patient reported recent falls with the most recent one day prior to admission  CT scan of the head was done and showed bilateral subdural hematomas with a 3 mm left to right midline shift.  Also there was a probable tiny amount of acute subdural hematoma on the right tentorium.    Neurosurgery  recommended admission to the ICU for further monitoring. Repeat CT head shows Stable hypodense subdural collections along the cerebral convexities on both sides with interval dissipation of the tiny focus of hyperdense hemorrhage noted along the lateral left temporal lobe since the comparison study.    - Neurosurgery consulted and appreciate their recommendations -> no further intervention and have now signed off    Neurosurgery recs 4 wks f/u CTscan    3.  Hyponatremia  Sodium levels appears to be stable at 132, encourage oral intake.    4.  HTN   HLP   He is back on his PTA, Norvasc metoprolol and Pravachol, blood pressure seems to be on the lower side, Norvasc discontinued 9/7.       5. Anxiety/Depression  Seizure disorder  - resume PTA effexor, keppra  -Patient is on Keppra due to his history of epilepsy, patient has an outpatient neurologist for this.  We briefly discussed about alcoholism and complications of being on seizure medications.  Patient understands that he should not be taking alcohol while on these medications.     6.   Hypokalemia  Currently stable, replace per protocol    7.  Back rash  H/o eczema  Appears to be more heat-related on my exam.  Will get up out of bed and continue to monitor      Diet: Advance Diet as Tolerated: Regular Diet Adult    DVT Prophylaxis: Pneumatic Compression Devices  Forman Catheter: not present  Code Status: Full Code           Diet: Advance Diet as Tolerated: Regular Diet Adult    DVT Prophylaxis: Pneumatic Compression Devices  Forman Catheter: not present  Code Status: Full Code      Disposition Plan   Expected discharge:several days  Entered: Meg Tomas MD 09/07/2020, 12:59 PM     Total time spend 35 min >50% spend on coordination of care including discharge planning, discussion about his prior admissions, medical issues, plan of    The patient's care was discussed with the Bedside Nurse and Patient.    Interval History   Patient is alert oriented x3, he is  aware that he is in the hospital, he mentions that he lives with his daughter and want to go home, he was trying to make sure that he is not on hold, he sees  Will bring outpatient for addiction, it seems that patient is struggling with alcoholism for more than 10 years now.  He is a retired  but currently involved in construction.  He does not drive anymore due to seizure disorder.    -Data reviewed today: I reviewed all new labs and imaging results over the last 24 hours. I personally reviewed no images or EKG's today.    Physical Exam   Temp: 98.2  F (36.8  C) Temp src: Oral BP: 98/70 Pulse: 72   Resp: 20 SpO2: 98 % O2 Device: None (Room air)    Vitals:    09/05/20 0200 09/06/20 0505 09/07/20 0243   Weight: 72.7 kg (160 lb 4.4 oz) 73.3 kg (161 lb 9.6 oz) 73.8 kg (162 lb 11.2 oz)     Vital Signs with Ranges  Temp:  [97.5  F (36.4  C)-98.4  F (36.9  C)] 98.2  F (36.8  C)  Pulse:  [53-82] 72  Resp:  [8-32] 20  BP: ()/() 98/70  SpO2:  [92 %-99 %] 98 %  I/O last 3 completed shifts:  In: 400 [P.O.:400]  Out: 600 [Urine:600]    Constitutional: Awake, alert, cooperative, no apparent distress  Respiratory: Clear to auscultation bilaterally, no crackles or wheezing  Cardiovascular: Regular rate and rhythm, normal S1 and S2, and no murmur noted  GI: Normal bowel sounds, soft, non-distended, non-tender  Skin/Integumen: No rashes, no cyanosis, no edema  Neuro : moving all 4 extremities, no focal deficit noted       Data   Labs:  Recent Labs   Lab 09/07/20  0530 09/06/20  1354 09/06/20  0556 09/05/20  1225  09/04/20  0518   * 132* 130*  --   --  136   POTASSIUM 3.5  --  3.5 3.8   < > 3.1*   CHLORIDE 98  --  97  --   --  99   CO2 29  --  28  --   --  32   ANIONGAP 5  --  5  --   --  5   *  --  117*  --   --  93   BUN 17  --  10  --   --  9   CR 0.63*  --  0.61*  --   --  0.62*   GFRESTIMATED >90  --  >90  --   --  >90   GFRESTBLACK >90  --  >90  --   --  >90   BLANCA 8.4*  --  8.7  --   --  8.9     < > = values in this interval not displayed.     Recent Labs   Lab 09/06/20  0556 09/03/20  0447 09/02/20  1112   WBC 8.8 5.4 4.9   HGB 14.8 13.4 13.3   HCT 42.8 38.8* 38.5*   MCV 96 97 98    75* 85*     No results for input(s): CKT in the last 168 hours.    Invalid input(s): CK, CK TOTAL  Recent Labs   Lab 09/06/20  0556 09/02/20  1112 08/31/20  1944   AST 39 100* 269*   ALT 63 87* 146*   ALKPHOS 91 93 129   BILITOTAL 1.3 1.4* 2.2*      Magnesium - pending    Recent Imaging:   No results found for this or any previous visit (from the past 24 hour(s)).    Medications       amLODIPine  5 mg Oral Daily     folic acid  1 mg Oral Daily     levETIRAcetam  750 mg Oral At Bedtime     metoprolol succinate ER  100 mg Oral Daily     multivitamin w/minerals  1 tablet Oral Daily     pravastatin  40 mg Oral At Bedtime     sodium chloride (PF)  3 mL Intracatheter Q8H     venlafaxine  300 mg Oral Daily

## 2020-09-07 NOTE — PLAN OF CARE
"Pt with intact neuros, ciwa scores 0/0.  Pt asked if he was on a hold, informed he was not.  He stated he was ready to go home tomorrow, \"I have lots of things to do\".  Redirected pt to agree to stay and order meals and allow nursing to assist him in pain management.  Pt up to BSC with assist/walker, BM x1.  Tolerated chair 3 hours, c/o severe back pain, medicated with diluadid 2 mg po and tylenol with some effect.  Prefers the bed vs. Chair during spasm.    Await bed availability on neuro floor.  "

## 2020-09-07 NOTE — PLAN OF CARE
Problem: Alcohol Withdrawal  Goal: Alcohol Withdrawal Symptom Control  Outcome: Improving     Problem: Adult Inpatient Plan of Care  Goal: Plan of Care Review  Outcome: Improving   CIWAS have been zero, neuros intact. Regular diet.

## 2020-09-07 NOTE — PLAN OF CARE
Neuro: orientedx2-3. Impulsive at times, flat affect. Otherwise intact. PO lorazepam given x1 for CIWA of 14. Pt mostly scoring for headache and tremors, currently 5.   CV: SR/SB. BP w/in ordered parameter w/o intervention.  Resp: sating mid-upper 90s on RA.  GI/: incontinent of urinex2 and up to commode x1. No BM this shift, passing flatus.  Access: PIVx1.   Skin: no new issues, L flank bruising and scattered bruises present and unchanged.  Family: no calls or visitors this shift.  Angela Kim RN on 9/7/2020 at 5:13 AM

## 2020-09-07 NOTE — PROGRESS NOTES
neuros unchanged/intact. VSS on room air. Up with gb/walker and assist of 1. Voiding per BSC. Pain controlled w/ po meds. Plan to transfer to [facility]. Tena Jalloh RN 09/07/20 5:00pm

## 2020-09-07 NOTE — PROGRESS NOTES
Pt with large plastic bag of home medications in room. Only controlled substance was lorazepam. 13- 1mg tablets sent to security and copy of valuable envelope in pt room and in pt chart. Angela Kim RN on 9/6/2020 at 9:56 PM

## 2020-09-08 ENCOUNTER — APPOINTMENT (OUTPATIENT)
Dept: PHYSICAL THERAPY | Facility: CLINIC | Age: 70
DRG: 896 | End: 2020-09-08
Payer: COMMERCIAL

## 2020-09-08 PROCEDURE — 25000132 ZZH RX MED GY IP 250 OP 250 PS 637: Mod: GY | Performed by: INTERNAL MEDICINE

## 2020-09-08 PROCEDURE — 12000000 ZZH R&B MED SURG/OB

## 2020-09-08 PROCEDURE — 97530 THERAPEUTIC ACTIVITIES: CPT | Mod: GP | Performed by: PHYSICAL THERAPIST

## 2020-09-08 PROCEDURE — 97116 GAIT TRAINING THERAPY: CPT | Mod: GP | Performed by: PHYSICAL THERAPIST

## 2020-09-08 PROCEDURE — 25000132 ZZH RX MED GY IP 250 OP 250 PS 637: Mod: GY | Performed by: STUDENT IN AN ORGANIZED HEALTH CARE EDUCATION/TRAINING PROGRAM

## 2020-09-08 PROCEDURE — 25000128 H RX IP 250 OP 636: Performed by: INTERNAL MEDICINE

## 2020-09-08 PROCEDURE — 40000275 ZZH STATISTIC RCP TIME EA 10 MIN

## 2020-09-08 PROCEDURE — 99233 SBSQ HOSP IP/OBS HIGH 50: CPT | Performed by: INTERNAL MEDICINE

## 2020-09-08 PROCEDURE — 99221 1ST HOSP IP/OBS SF/LOW 40: CPT | Performed by: PSYCHIATRY & NEUROLOGY

## 2020-09-08 RX ORDER — LORAZEPAM 0.5 MG/1
0.5 TABLET ORAL 2 TIMES DAILY
Status: DISCONTINUED | OUTPATIENT
Start: 2020-09-08 | End: 2020-09-08

## 2020-09-08 RX ORDER — LORAZEPAM 1 MG/1
1 TABLET ORAL 2 TIMES DAILY
Status: DISCONTINUED | OUTPATIENT
Start: 2020-09-08 | End: 2020-09-09 | Stop reason: HOSPADM

## 2020-09-08 RX ADMIN — VENLAFAXINE HYDROCHLORIDE 300 MG: 150 CAPSULE, EXTENDED RELEASE ORAL at 09:18

## 2020-09-08 RX ADMIN — ACETAMINOPHEN 650 MG: 325 TABLET, FILM COATED ORAL at 19:20

## 2020-09-08 RX ADMIN — ACETAMINOPHEN 650 MG: 325 TABLET, FILM COATED ORAL at 09:38

## 2020-09-08 RX ADMIN — METOPROLOL SUCCINATE 100 MG: 100 TABLET, EXTENDED RELEASE ORAL at 09:18

## 2020-09-08 RX ADMIN — FOLIC ACID 1 MG: 1 TABLET ORAL at 09:18

## 2020-09-08 RX ADMIN — PRAVASTATIN SODIUM 40 MG: 40 TABLET ORAL at 21:07

## 2020-09-08 RX ADMIN — MULTIPLE VITAMINS W/ MINERALS TAB 1 TABLET: TAB at 09:18

## 2020-09-08 RX ADMIN — ONDANSETRON 4 MG: 4 TABLET, ORALLY DISINTEGRATING ORAL at 14:05

## 2020-09-08 RX ADMIN — LEVETIRACETAM 750 MG: 750 TABLET, EXTENDED RELEASE ORAL at 21:07

## 2020-09-08 RX ADMIN — LORAZEPAM 1 MG: 1 TABLET ORAL at 14:00

## 2020-09-08 RX ADMIN — LORAZEPAM 1 MG: 1 TABLET ORAL at 23:31

## 2020-09-08 ASSESSMENT — ACTIVITIES OF DAILY LIVING (ADL)
ADLS_ACUITY_SCORE: 16

## 2020-09-08 ASSESSMENT — MIFFLIN-ST. JEOR: SCORE: 1437.59

## 2020-09-08 NOTE — PLAN OF CARE
A&Ox4. CMS intact. CIWA 4, neuros slow and deliberate. Bowel sounds audible, passing flatus, tolerating regular diet. VSS. Up with SBA with gb/w. C/o right wrist discomfort, declined interventions. Pt scoring green on the Aggression Stop Light Tool. Plan pending TCU placement.     Admission

## 2020-09-08 NOTE — PLAN OF CARE
"OT: attempted ,pt refused despite encouragement 2' \"being up all night.\" Pt stating he is leaving today, and states he is not agreeable to therapy later as he is leaving.  "

## 2020-09-08 NOTE — PLAN OF CARE
Pt here with SDH, alcohol withdrawal. A&Ox4. Neuros intact, flat affect. VSS. Tele SB. Regular diet, thin liquids. Takes pills whole with water. Up with A1/GB/W. Denies pain. Saline locked. Pt scoring green on the Aggression Stop Light Tool. Continue to monitor.   Belongings: at the bedside

## 2020-09-08 NOTE — PLAN OF CARE
"Discharge Planner PT   Patient plan for discharge: Home     Current status: Pt received in bed, declined OT earlier, agreeable to \"get up\" in order to straighten out linens, once up pt agreeable to a walk. Pt completed bed mobility with SBA and sit to stand transfers with CGA and WW, pt indep at baseline. Pt ambulated 300 ft with WW and CGA, dec step length with reduced gait speed, moderate pathway deviation but no LOB. Pt needing to significantly slow down to change directions.   Barriers to return to prior living situation: steps to manage at home, alone most of the times, impaired balance and strength, fall risk, level of assist, pain  Recommendations for discharge: TCU  Rationale for recommendations: Pt is below baseline and would benefit from continued PT while IP and at TCU to improve functional strength, balance, activity tolerance, safety and IND with functional mobility and reduce falls risk prior to returning home.       Entered by: Isabel Craig 09/08/2020 12:31 PM       "

## 2020-09-08 NOTE — PROGRESS NOTES
"CLINICAL NUTRITION SERVICES - REASSESSMENT NOTE      Recommendations Ordered by Registered Dietitian (RD): Boost BID between meals    Malnutrition: % Weight Loss:  Up to 10% in 6 months (non-severe malnutrition)  % Intake:  </= 50% for >/= 5 days (severe malnutrition) - On 9/4  Subcutaneous Fat Loss:  None observed  Muscle Loss:  Temporal region mild depletion, Clavicle bone region mild depletion, Acromion bone region mild depletion, Patellar region mild depletion, Anterior thigh region mild depletion and Posterior calf region mild depletion  Fluid Retention:  None noted     Malnutrition Diagnosis: Non-Severe malnutrition  In Context of:  Acute illness or injury  Environmental or social circumstances       EVALUATION OF PROGRESS TOWARD GOALS   Diet:  Regular diet (advanced 9/3)    Intake/Tolerance:  Visited with patient this afternoon via phone.  He states that his appetite is fair.  \"I ate well this morning for breakfast but now I'm not really feeling like eating much for lunch\".  He was able to eat 100% of his meal this am --> Omelet, muffin, peaches, and OJ.    He is interested in trying a Boost supplement between meals for extra nutrition.   Overall patient seemed slightly confused during our conversation asking me to adjust his identification band with some scissors claiming it's too tight.       ASSESSED NUTRITION NEEDS:  Dosing Weight:  72.3 kg (admit wt)  Estimated Energy Needs: 8669-0133 kcals (25-30 Kcal/Kg)  Justification: maintenance  Estimated Protein Needs:   grams protein (1.2-1.5 g pro/Kg)  Justification: Repletion and hypercatabolism with acute illness      NEW FINDINGS:   Weight 71.4 kg (down slightly from admit)     Previous Goals (9/3):   Nutrition will be addressed in the next 24-48 hrs (diet advancement vs TF)  Evaluation: Met    Previous Nutrition Diagnosis (9/3):   Inadequate protein-energy intake related to confusion as evidenced by NPO x 4 days, meeting 0% needs  Evaluation: " Improving      CURRENT NUTRITION DIAGNOSIS  Inadequate oral intake related to variable appetite as evidenced by ate well this morning for breakfast but little at lunch     INTERVENTIONS  Recommendations / Nutrition Prescription  Continue regular diet as tolerated  Boost BID between meals     Implementation  Medical Food Supplement:  Ordered as above     Goals  Patient will consistently consume >/= 50% meals and supplements     MONITORING AND EVALUATION:  Progress towards goals will be monitored and evaluated per protocol and Practice Guidelines    Ayde Vo RD, LD, CNSC   Clinical Dietitian - [facility]

## 2020-09-08 NOTE — CONSULTS
Psychiatry consult:  Patient seen; full note to follow    Alcohol use disorder, severe  Alcohol withdrawal, resolved  Major Depressive Disorder, recurrent, moderate  Seizure Disorder    Recommendations:  Continue Effexor at the current dose for mood and anxiety management. Mood appears stable and his acute suicide risk is low today.     Alcohol withdrawal symptoms are subsiding without evidence of psychosis or DT's on exam today. He expressed a plan to abstain from alcohol use however is not interested in residential or intense outpatient treatment. He will return to his outpatient psychiatrist and utilize community resources.     The patient may be discharged home once determined to be medically stable.     Please re consult as needed.   Rhys Toledo MD   Text Page

## 2020-09-08 NOTE — PROGRESS NOTES
[facility]  Hospitalist Progress Note    Admit Date:  8/31/2020  Date of Service (when I saw the patient): 09/08/2020   Provider: Meg Tomas MD      Assessment & Plan   Jong Galarza is a 70 year old male who was admitted on 8/31/2020. He has a history of HTN, HLP, anxiety and depression who presented to the ED on 8/31/2020 with concerns for alcohol withdrawal.  Work up in the ED though showed bilateral subdural hematomas with a 3 mm shift, stable on repeat imaging.  Has remained in ICU for precedex gtt and alcohol withdrawal syndrome, Precedex gtt. has been off since then 9/5, patient was transitioned to neuro telemetry floor on 9/7    Problem List:    1. Delirium Tremens  Alcohol abuse with concerns for withdrawal   Patient supposedly drinks 0.5 L of vodka daily with his last drink a day prior to arrival in the ED.  Has been tremulous in the ED with N/V and dizziness recently.  Did require Ativan x2 in the ED for withdrawal symptoms. Still had DTs despite CIWA protocol, precedex gtt started by critical care service with improvement in agitation.     Precedex now off since 9/5  No prn ativan needed  Patient is alert oriented x3 today and he is planning to continue his addiction treatment outpatient with his addiction psychiatrist on discharge.  -I contacted patient's daughter and it seems that she lives in [place] and the patient lives by himself at home, she was quite concerned about him being discharged without plan for rehabilitation, we discussed about getting PT/OT evaluate the patient, PT has recommended TCU, patient had mentioned that he would like to go home and then go to TCU, I had a long discussion about that with him.  Will request social work input.  -He received IV valproic acid and has been discontinued since 9/6.  2. Bilateral subdural hematomas   While in the ED the patient reported recent falls with the most recent one day prior to admission  CT scan of the head was  done and showed bilateral subdural hematomas with a 3 mm left to right midline shift.  Also there was a probable tiny amount of acute subdural hematoma on the right tentorium.    Neurosurgery recommended admission to the ICU for further monitoring. Repeat CT head shows Stable hypodense subdural collections along the cerebral convexities on both sides with interval dissipation of the tiny focus of hyperdense hemorrhage noted along the lateral left temporal lobe since the comparison study.    - Neurosurgery consulted and appreciate their recommendations -> no further intervention and have now signed off    Neurosurgery recs 4 wks f/u CTscan    3.  Hyponatremia  Sodium levels appears to be stable at 132, encourage oral intake.    4.  HTN   HLP   He is back on his PTA, Norvasc metoprolol and Pravachol, blood pressure seems to be on the lower side, Norvasc discontinued 9/7.       5. Anxiety/Depression  Seizure disorder  - resume PTA effexor, keppra  -Patient is on Keppra due to his history of epilepsy, patient has an outpatient neurologist for this.  We briefly discussed about alcoholism and complications of being on seizure medications.  Patient understands that he should not be taking alcohol while on these medications.  ---Patient is PTA on lorazepam, he requested that it was restarted.     6.   Hypokalemia  Currently stable, replace per protocol    7.  Back rash  H/o eczema  Appears to be more heat-related on my exam.  Will get up out of bed and continue to monitor      Diet: Advance Diet as Tolerated: Regular Diet Adult    DVT Prophylaxis: Pneumatic Compression Devices  Forman Catheter: not present  Code Status: Full Code             Disposition Plan   Expected discharge:several days  Entered: Meg Tomas MD 09/08/2020, 1:01 PM     Total time spend 35 min >50% spend on coordination of care including discharge planning, discussion with family and nursing.      Interval History   Patient is scoring 0 in the CIWA  scale, he is quite weak, plan is for PT and OT to evaluate him, he had refused to delay care later today, had to discuss with him about the importance of participating in PT and OT so we can assist him with the right tools, and he is now willing to try working with PT and OT, he is interested to go to transitional care (I need to be in a nursing home).    -Data reviewed today: I reviewed all new labs and imaging results over the last 24 hours. I personally reviewed no images or EKG's today.    Physical Exam   Temp: 97.8  F (36.6  C) Temp src: Oral BP: 113/72 Pulse: 72   Resp: 16 SpO2: 95 % O2 Device: None (Room air)    Vitals:    09/06/20 0505 09/07/20 0243 09/08/20 0643   Weight: 73.3 kg (161 lb 9.6 oz) 73.8 kg (162 lb 11.2 oz) 71.4 kg (157 lb 6.4 oz)     Vital Signs with Ranges  Temp:  [97.6  F (36.4  C)-98.3  F (36.8  C)] 97.8  F (36.6  C)  Pulse:  [56-72] 72  Resp:  [8-16] 16  BP: (100-140)/(67-88) 113/72  SpO2:  [93 %-96 %] 95 %  I/O last 3 completed shifts:  In: 240 [P.O.:240]  Out: 100 [Urine:100]    Constitutional: Awake, alert, cooperative, no apparent distress  Respiratory: Clear to auscultation bilaterally, no crackles or wheezing  Cardiovascular: Regular rate and rhythm, normal S1 and S2, and no murmur noted  GI: Normal bowel sounds, soft, non-distended, non-tender  Skin/Integumen: No rashes, no cyanosis, no edema  Neuro : moving all 4 extremities, no focal deficit noted       Data   Labs:  Recent Labs   Lab 09/07/20  0530 09/06/20  1354 09/06/20  0556 09/05/20  1225  09/04/20  0518   * 132* 130*  --   --  136   POTASSIUM 3.5  --  3.5 3.8   < > 3.1*   CHLORIDE 98  --  97  --   --  99   CO2 29  --  28  --   --  32   ANIONGAP 5  --  5  --   --  5   *  --  117*  --   --  93   BUN 17  --  10  --   --  9   CR 0.63*  --  0.61*  --   --  0.62*   GFRESTIMATED >90  --  >90  --   --  >90   GFRESTBLACK >90  --  >90  --   --  >90   BLANCA 8.4*  --  8.7  --   --  8.9    < > = values in this interval not  displayed.     Recent Labs   Lab 09/06/20  0556 09/03/20  0447 09/02/20  1112   WBC 8.8 5.4 4.9   HGB 14.8 13.4 13.3   HCT 42.8 38.8* 38.5*   MCV 96 97 98    75* 85*     No results for input(s): CKT in the last 168 hours.    Invalid input(s): CK, CK TOTAL  Recent Labs   Lab 09/06/20  0556 09/02/20  1112   AST 39 100*   ALT 63 87*   ALKPHOS 91 93   BILITOTAL 1.3 1.4*      Magnesium - pending    Recent Imaging:   No results found for this or any previous visit (from the past 24 hour(s)).    Medications       folic acid  1 mg Oral Daily     levETIRAcetam  750 mg Oral At Bedtime     LORazepam  1 mg Oral BID     metoprolol succinate ER  100 mg Oral Daily     multivitamin w/minerals  1 tablet Oral Daily     pravastatin  40 mg Oral At Bedtime     sodium chloride (PF)  3 mL Intracatheter Q8H     venlafaxine  300 mg Oral Daily

## 2020-09-09 ENCOUNTER — APPOINTMENT (OUTPATIENT)
Dept: OCCUPATIONAL THERAPY | Facility: CLINIC | Age: 70
DRG: 896 | End: 2020-09-09
Payer: COMMERCIAL

## 2020-09-09 ENCOUNTER — APPOINTMENT (OUTPATIENT)
Dept: PHYSICAL THERAPY | Facility: CLINIC | Age: 70
DRG: 896 | End: 2020-09-09
Payer: COMMERCIAL

## 2020-09-09 VITALS
HEART RATE: 82 BPM | RESPIRATION RATE: 18 BRPM | SYSTOLIC BLOOD PRESSURE: 113 MMHG | TEMPERATURE: 98.1 F | WEIGHT: 156.1 LBS | BODY MASS INDEX: 24.5 KG/M2 | HEIGHT: 67 IN | DIASTOLIC BLOOD PRESSURE: 64 MMHG | OXYGEN SATURATION: 96 %

## 2020-09-09 PROCEDURE — 97112 NEUROMUSCULAR REEDUCATION: CPT | Mod: GP

## 2020-09-09 PROCEDURE — 25000132 ZZH RX MED GY IP 250 OP 250 PS 637: Mod: GY | Performed by: INTERNAL MEDICINE

## 2020-09-09 PROCEDURE — 97110 THERAPEUTIC EXERCISES: CPT | Mod: GP

## 2020-09-09 PROCEDURE — 25000128 H RX IP 250 OP 636: Performed by: INTERNAL MEDICINE

## 2020-09-09 PROCEDURE — 25000132 ZZH RX MED GY IP 250 OP 250 PS 637: Mod: GY | Performed by: STUDENT IN AN ORGANIZED HEALTH CARE EDUCATION/TRAINING PROGRAM

## 2020-09-09 PROCEDURE — 97530 THERAPEUTIC ACTIVITIES: CPT | Mod: GO | Performed by: OCCUPATIONAL THERAPIST

## 2020-09-09 PROCEDURE — 99239 HOSP IP/OBS DSCHRG MGMT >30: CPT | Performed by: INTERNAL MEDICINE

## 2020-09-09 PROCEDURE — 97535 SELF CARE MNGMENT TRAINING: CPT | Mod: GO | Performed by: OCCUPATIONAL THERAPIST

## 2020-09-09 RX ADMIN — MULTIPLE VITAMINS W/ MINERALS TAB 1 TABLET: TAB at 08:58

## 2020-09-09 RX ADMIN — FOLIC ACID 1 MG: 1 TABLET ORAL at 08:58

## 2020-09-09 RX ADMIN — VENLAFAXINE HYDROCHLORIDE 300 MG: 150 CAPSULE, EXTENDED RELEASE ORAL at 08:58

## 2020-09-09 RX ADMIN — ACETAMINOPHEN 650 MG: 325 TABLET, FILM COATED ORAL at 07:52

## 2020-09-09 RX ADMIN — ONDANSETRON 4 MG: 4 TABLET, ORALLY DISINTEGRATING ORAL at 08:48

## 2020-09-09 RX ADMIN — METOPROLOL SUCCINATE 100 MG: 100 TABLET, EXTENDED RELEASE ORAL at 08:58

## 2020-09-09 RX ADMIN — LORAZEPAM 1 MG: 1 TABLET ORAL at 08:57

## 2020-09-09 ASSESSMENT — ACTIVITIES OF DAILY LIVING (ADL)
ADLS_ACUITY_SCORE: 15
ADLS_ACUITY_SCORE: 15
ADLS_ACUITY_SCORE: 16

## 2020-09-09 ASSESSMENT — MIFFLIN-ST. JEOR: SCORE: 1431.69

## 2020-09-09 NOTE — PLAN OF CARE
Discharge Planner PT   Patient plan for discharge: Home - declining TCU.     Current status: Bed mobility & sit-stands Modified [place] with upper extremity use, pivots and 500' walk no assistive device standby assist self-selected gait and min assist during FGA tasks - 12/30 (falls risk indoors when < 20 and outdoors when < 23), stairs reciprocally slowly with 2 rails Supervision.  Limping with right lateral hamstrings pain with tenderness to palpation - stretch with gentle releases. Pt noted to be struggling with communication, impaired cognition affecting safe mobility awareness & decision-making but pt is agreeable to walker use. Edu on benefits of four-wheeled walker use but pt saying he'll get a two-wheeled walker given two-wheeled walker expense.  Barriers to return to prior living situation: None  Recommendations for discharge: Home with consistent walker use, OP PT for balance/falls risk & right hamstring strain.  Rationale for recommendations: Per above.       Entered by: Brittany Dressler 09/09/2020 2:42 PM     Physical Therapy Discharge Summary    Reason for therapy discharge:    Discharged to home with outpatient therapy. (If pt agreeable to OP rehab)    Progress towards therapy goal(s). See goals on Care Plan in [facility] electronic health record for goal details.  Goals not met.  Barriers to achieving goals:   discharge from facility.    Therapy recommendation(s):    Continued therapy is recommended.  Rationale/Recommendations:  See above..

## 2020-09-09 NOTE — CONSULTS
[facility]  Psychiatry Consultation      Patient name: Jong Galarza   MRN: [id number]    Age: 70 year old    YOB: 1950    Identifying information:   The patient is a 70 year old White male who is currently admitted to the hospitalist service.    Reason for consult: Assist in comanaging alcohol use disorder.    History of present illness:   The patient has a history of alcohol use disorder who presented to the emergency room for evaluation of nausea and vomiting in the context of a recent relapse on alcohol.  There was also concern for an emerging altered mental state for which a head CT was conducted and revealed a subdural hematoma.  He was admitted to the hospital for further management and treatment noting a fair risk of alcohol withdrawal symptoms and seizures.  His treatment course was complicated by episodes of agitation thought to be stemming from delirium tremens for which he received treatment with Precedex and Haldol while being monitored on a [facility] protocol.  He last received benzodiazepine treatment as per the [facility] protocol over 24 hours ago.  On examination today, the patient presented as somewhat dismissive and disinterested in a psychiatric evaluation.  He explains that he has a psychiatrist and feels as though he is responding well to their care plan.  The patient referred to himself as an alcoholic and summarized a brief relapse of consuming 2 bottles of vodka after 2 years of sobriety.  He suspects that he acquired a subdural hematoma after a fall while he was cleaning his gutters recently.  He also injured his wrist in the process.  He expressed regret for his recent relapse and plans to resume sobriety.  He adamantly denied suicidal or homicidal thoughts.  No psychotic symptoms endorsed.  He denied any preceding mood episodes or associated psychosocial stressors.    Psychiatric Review of Systems:    -- Depressive episode: Denied symptoms.  -- Bernice:   denies symptoms  -- Psychosis:  denies symptoms  -- Anxiety: denies symptoms corresponding to LUX or panic disorder  -- PTSD: denies symptoms  -- OCD: denies  symptoms  -- Eating disorder: denies symptoms    Psychiatric History:    Established diagnosis of major depressive disorder with at least one prior inpatient psychiatric hospitalization.  No prior suicide attempts.  He is currently under the care of his outpatient psychiatrist.    Substance Use History:    Established diagnosis of an alcohol use disorder for which he has attended treatment several times.  Treatment is further been augmented with Antabuse.  Consequences have involved DUIs.  His recent detox phase was complicated by delirium tremens.  No illicit substance usage reported.    Medical History:  Refer to the internal medicine notes which I reviewed.  Notable for seizure disorder.      Current Facility-Administered Medications:      acetaminophen (TYLENOL) tablet 650 mg, 650 mg, Oral, Q4H PRN, Gabby Arias DO, 650 mg at 09/08/20 1920     folic acid (FOLVITE) tablet 1 mg, 1 mg, Oral, Daily, Bakari Snyder DO, 1 mg at 09/08/20 0918     hydrALAZINE (APRESOLINE) injection 10-20 mg, 10-20 mg, Intravenous, Q1H PRN, Bakari Snyder DO, 20 mg at 09/03/20 1117     HYDROmorphone (DILAUDID) tablet 2-4 mg, 2-4 mg, Oral, Q4H PRN, Gabby Arias DO, 2 mg at 09/07/20 2109     labetalol (NORMODYNE/TRANDATE) injection 10-20 mg, 10-20 mg, Intravenous, Q10 Min PRN, Bakari Snyder DO, 10 mg at 09/06/20 0507     levETIRAcetam (KEPPRA XR) 24 hr tablet 750 mg, 750 mg, Oral, At Bedtime, Dave Sanchez MD, 750 mg at 09/07/20 2109     LORazepam (ATIVAN) tablet 1-2 mg, 1-2 mg, Oral, Q30 Min PRN, 1 mg at 09/06/20 2108 **OR** LORazepam (ATIVAN) injection 1-2 mg, 1-2 mg, Intravenous, Q30 Min PRN, Bakari Snyder DO, 2 mg at 09/05/20 0216     LORazepam (ATIVAN) injection 2 mg, 2 mg, Intravenous, Q2H PRN, Lawanda Jones  MD, 2 mg at 09/04/20 2311     LORazepam (ATIVAN) tablet 1 mg, 1 mg, Oral, BID, Meg Tomas MD, 1 mg at 09/08/20 1400     metoprolol succinate ER (TOPROL-XL) 24 hr tablet 100 mg, 100 mg, Oral, Daily, Dave Sanchez MD, 100 mg at 09/08/20 0918     multivitamin w/minerals (THERA-VIT-M) tablet 1 tablet, 1 tablet, Oral, Daily, Bakari Snyder DO, 1 tablet at 09/08/20 0918     naloxone (NARCAN) injection 0.1-0.4 mg, 0.1-0.4 mg, Intravenous, Q2 Min PRN, Bakari Snyder DO     ondansetron (ZOFRAN-ODT) ODT tab 4 mg, 4 mg, Oral, Q6H PRN, 4 mg at 09/08/20 1405 **OR** ondansetron (ZOFRAN) injection 4 mg, 4 mg, Intravenous, Q6H PRN, Bakari Snyder DO     potassium chloride (KLOR-CON) Packet 20-40 mEq, 20-40 mEq, Oral or Feeding Tube, Q2H PRN, Dave Sanchez MD     potassium chloride 10 mEq in 100 mL intermittent infusion with 10 mg lidocaine, 10 mEq, Intravenous, Q1H PRN, Dave Sanchez MD, 10 mEq at 09/04/20 1109     potassium chloride 10 mEq in 100 mL sterile water intermittent infusion (premix), 10 mEq, Intravenous, Q1H PRN, Dave Sanchez MD, Last Rate: 100 mL/hr at 09/05/20 0547, 10 mEq at 09/05/20 0547     potassium chloride 20 mEq in 50 mL intermittent infusion, 20 mEq, Intravenous, Q1H PRN, Dave Sanchez MD     potassium chloride ER (KLOR-CON M) CR tablet 20-40 mEq, 20-40 mEq, Oral, Q2H PRN, Dave Sanchez MD, 20 mEq at 09/04/20 2010     pravastatin (PRAVACHOL) tablet 40 mg, 40 mg, Oral, At Bedtime, Dave Sanchez MD, 40 mg at 09/07/20 2109     sodium chloride (PF) 0.9% PF flush 3 mL, 3 mL, Intracatheter, Q8H, Gabby Arias DO, 3 mL at 09/08/20 1405     venlafaxine (EFFEXOR-XR) 24 hr capsule 300 mg, 300 mg, Oral, Daily, Dave Sanchez MD, 300 mg at 09/08/20 0918     Social History:  Refer to the psychosocial assessment completed by the .  He previously worked as an .  He resides independently.     Family History:    Records mention 2 uncles that committed  "suicide.    Vital Signs:    B/P: 130/83, T: 98.1, P: 71, R: 16  Estimated body mass index is 24.65 kg/m  as calculated from the following:    Height as of this encounter: 1.702 m (5' 7\").    Weight as of this encounter: 71.4 kg (157 lb 6.4 oz).       Mental status examination:  Appearance: Alert, fair hygiene, no acute distress although he seemed slightly irritable.  Attitude: Mostly cooperative  Eye Contact: Fair  Mood: \"Tired\"  Affect: Mood congruent and slightly irritable  Speech:  Normal rates, tone, latency, volume. Not pushed or pressured.  Psychomotor Behavior:  No psychomotor abnormalities noted  Thought Process: Linear and logical; not tangential or circumstantial or disorganized  Associations:  Logical; no loose associations Noted  Thought Content:  No obvious paranoia, delusions, ideas of reference, or grandiosity noted. Denies auditory or visual hallucinations. Denies suicidal Ideations. Denies homicidal ideations.  Insight:  Fair  Judgment:  Fair  Oriented to:  time, person, and place  Attention Span and Concentration:  Intact  Recent and Remote Memory: Intact based on interviewing and details provided  Language: Appropriate based on interviewing  Fund of Knowledge: Appropriate based on interviewing  Muscle Strength and Tone: Normal upon visual inspection          Diagnoses:    Alcohol use disorder, severe  Alcohol withdrawal, resolved  Major Depressive Disorder, recurrent, moderate  Seizure Disorder     Recommendations:  Continue Effexor at the current dose for mood and anxiety management. Mood appears stable and his acute suicide risk is low today.      Alcohol withdrawal symptoms are subsiding without evidence of psychosis or DT's on exam today. He expressed a plan to abstain from alcohol use however is not interested in residential or intense outpatient treatment. He will return to his outpatient psychiatrist and utilize community resources.      Notes indicate that the treatment team is " recommending admission to a transitional care unit.  At this time, he does not have any psychiatric contraindications that would prevent discharge from the hospital.    Please reconsult with psychiatry as needed.   Rhys Toledo MD   Text Page

## 2020-09-09 NOTE — DISCHARGE INSTRUCTIONS
Use your wheeled walker for safety. Please follow up with your physician team and work with rehab as recommended to increase your strength, stamina, and balance.

## 2020-09-09 NOTE — PLAN OF CARE
Discharge Planner OT   Patient plan for discharge: Reports he would be open to a rehab stay if recommended  Current status: Pt was up walking gonzales with nursing staff; OT took over. Ambulates with CGA and + ft. Reports R hamstring area pain with knee buckling 1x. Occassionally getting too close to objects and people in hallway with walker, nearly running into them. Pt completed toilet task with CGA, cues. Completed SLUMS with score of 20/30, indicating dementia level score (a score of 1-20 is dementia level score.) He struggled greatly with drawing a clock, and put the numbers backwards and all on the R side of the clock, did not draw any hands. Vision was screened and he has difficulty tracking across midline on R eye and tracking vertically with L eye. Pt lives alone, has had multiple falls, is here for SDH and ETOH withdrawal. Reports he fell off his garage roof about 1 month ago.  Barriers to return to prior living situation: lives alone, head injury with cognitive impairment, high falls risk, current level of assist needed  Recommendations for discharge: TCU  Rationale for recommendations: Due to the above barriers, pt seems unsafe to discharge home alone at this time and would benefit from continued therapies at TCU setting. He seemed agreeable to a short rehab stay today.       Entered by: Bety Shahid 09/09/2020 10:03 AM

## 2020-09-09 NOTE — PLAN OF CARE
Pt here with bilateral SDH, alcohol withdrawal. A&Ox4. Neuros intact, flat affect. VSS. Tele SB. Regular diet, thin liquids. Takes pills whole with water. Up with A1/GB/W. Seizure precautions maintained. Denies pain. Saline locked. Slept well. Pt scoring green on the Aggression Stop Light Tool. Plan to discharge to TCU.     Belongings: at the bedside

## 2020-09-09 NOTE — DISCHARGE SUMMARY
[facility]    Discharge Summary  Hospitalist    Date of Admission:  8/31/2020  Date of Discharge:  9/9/2020  Discharging Provider: Meg Tomas MD    Discharge Diagnoses   Subdural hematoma  Alcohol withdrawal with DTs  Nonsevere malnutrition    History of Present Illness   Please review admission history and physical.    Hospital Course   Jong Galarza was admitted on 8/31/2020.  The following problems were addressed during his hospitalization:    Active Problems:    Subdural hematoma (H)  Jong Galarza is a 70 year old male who was admitted on 8/31/2020. He has a history of HTN, HLP, anxiety and depression who presented to the ED on 8/31/2020 with concerns for alcohol withdrawal.  Work up in the ED though showed bilateral subdural hematomas with a 3 mm shift, stable on repeat imaging.  Has remained in ICU for precedex gtt and alcohol withdrawal syndrome, Precedex gtt. has been off since then 9/5, patient was transitioned to neuro telemetry floor on 9/7     Problem List:     1. Delirium Tremens  Alcohol abuse with concerns for withdrawal   Patient supposedly drinks 0.5 L of vodka daily with his last drink a day prior to arrival in the ED.  Has been tremulous in the ED with N/V and dizziness recently.  Did require Ativan x2 in the ED for withdrawal symptoms. Still had DTs despite CIWA protocol, precedex gtt started by critical care service with improvement in agitation.      Precedex now off since 9/5  No prn ativan needed  Patient is alert oriented x3 today and he is planning to continue his addiction treatment outpatient with his addiction psychiatrist on discharge.  -I contacted patient's daughter and it seems that she lives in [place] and the patient lives by himself at home, she was quite concerned about him being discharged without plan for rehabilitation, we discussed about getting PT/OT evaluate the patient, PT has recommended TCU, patient had mentioned that he would like to go  home and then go to TCU, I had a long discussion about that with him.    Social work PT requested discussed with patient but patient insisted on going home without home PT or even TCU, will arrange outpatient PT.  -He received IV valproic acid and has been discontinued since 9/6.  2. Bilateral subdural hematomas   While in the ED the patient reported recent falls with the most recent one day prior to admission  CT scan of the head was done and showed bilateral subdural hematomas with a 3 mm left to right midline shift.  Also there was a probable tiny amount of acute subdural hematoma on the right tentorium.    Neurosurgery recommended admission to the ICU for further monitoring. Repeat CT head shows Stable hypodense subdural collections along the cerebral convexities on both sides with interval dissipation of the tiny focus of hyperdense hemorrhage noted along the lateral left temporal lobe since the comparison study.  Patient was seen by neurology services, no further intervention is recommended, neurology recommended restarting Eliquis in 4 weeks after follow-up CT scan.     3.  Hyponatremia  Sodium levels appears to be stable at 132, encourage oral intake.     4.  HTN   HLP   He is back on his PTA, Norvasc metoprolol and Pravachol, blood pressure seems to be on the lower side, Norvasc discontinued 9/7.        5. Anxiety/Depression  Seizure disorder  - resume PTA effexor, keppra  -Patient is on Keppra due to his history of epilepsy, patient has an outpatient neurologist for this.  We briefly discussed about alcoholism and complications of being on seizure medications.  Patient understands that he should not be taking alcohol while on these medications.  ---Patient is PTA on lorazepam, he requested that it was restarted.  I have discussed with the patient to discuss his fall risk with his neurologist as well as his addiction psychiatrist to decide whether he needs to continue lorazepam or not, he is also on  Seroquel PRN.  Both of them can cause falls     6.   Hypokalemia  Currently stable, replace per protocol     7.  Back rash  H/o eczema  Appears to be more heat-related on my exam.  Will get up out of bed and continue to monitor    TCU was recommended by PT and OT, patient is high risk for fall, we had a long discussion about getting home health care arranged at home, patient is decisional and did not want anybody to go to his home to provide him with any services, he also mentions that he will be in touch with his psychiatrist and his neurologist as soon as he is released.  Will arrange outpatient PT and OT.    Meg Tomas MD, MD    Significant Results and Procedures       Pending Results     Unresulted Labs Ordered in the Past 30 Days of this Admission     No orders found from 8/1/2020 to 9/1/2020.          Code Status   Full Code       Primary Care Physician   Physician No Ref-Primary    Physical Exam   Temp: 98.1  F (36.7  C) Temp src: Oral BP: 113/64 Pulse: 82   Resp: 18 SpO2: 96 % O2 Device: None (Room air)    Vitals:    09/07/20 0243 09/08/20 0643 09/09/20 0500   Weight: 73.8 kg (162 lb 11.2 oz) 71.4 kg (157 lb 6.4 oz) 70.8 kg (156 lb 1.6 oz)     Vital Signs with Ranges  Temp:  [98.1  F (36.7  C)-98.7  F (37.1  C)] 98.1  F (36.7  C)  Pulse:  [68-82] 82  Resp:  [16-18] 18  BP: (108-146)/(64-89) 113/64  SpO2:  [92 %-97 %] 96 %  I/O last 3 completed shifts:  In: 420 [P.O.:420]  Out: 100 [Urine:100]    The patient was examined on the day of discharge.    Discharge Disposition   Discharged to home  Condition at discharge: Stable    Consultations This Hospital Stay   PHYSICAL THERAPY ADULT IP CONSULT  OCCUPATIONAL THERAPY ADULT IP CONSULT  NEUROSURGERY IP CONSULT  PSYCHIATRY IP CONSULT  PHYSICAL THERAPY ADULT IP CONSULT  OCCUPATIONAL THERAPY ADULT IP CONSULT  SOCIAL WORK IP CONSULT  SOCIAL WORK IP CONSULT    Time Spent on this Encounter   IMeg MD, personally saw the patient today and spent greater  than 30 minutes discharging this patient.    Discharge Orders      CT Head w/o contrast*     Follow-up and recommended labs and tests     Please follow up at [facility] in 1 month with repeat head CT.  Please call the clinic at [phone] to schedule your appointment.     Reason for your hospital stay    Subdural hematoma, alcoholism with withdrawal     Follow-up and recommended labs and tests     Follow up with primary care provider, Physician No Ref-Primary, within 7 days for hospital follow- up.  No follow up labs or test are needed.  Patient needs to follow-up with his addiction psychiatrist as well as with his neurologist outpatient.  Repeat CT head is recommended and follow-up with neurosurgery, earlier if any new symptoms.     Activity    Your activity upon discharge: activity as tolerated     Discharge Instructions    Avoid taking Seroquel and lorazepam while you are consuming alcohol .above  medications are high fall risk medications, please discussed this with your addiction psychiatrist as well as neurologist, it would be best if you could avoid taking these 2 medications until you visit with them.     Full Code     Walker    DME Documentation:   Describe the reason for need to support medical necessity: Deconditioning, PT recommending TCU    I, the undersigned, certify that the above prescribed supplies are medically necessary for this patient and is both reasonable and necessary in reference to accepted standards of medical and necessary in reference to accepted standards of medical practice in the treatment of this patient's condition and is not prescribed as a convenience.     Diet    Follow this diet upon discharge: Orders Placed This Encounter      Snacks/Supplements Adult: Boost Plus; Between Meals      Advance Diet as Tolerated: Regular Diet Adult     Discharge Medications   Current Discharge Medication List      CONTINUE these medications which have NOT CHANGED    Details    acetaminophen (TYLENOL) 325 MG tablet Take 650 mg by mouth every 4 hours as needed for mild pain      levETIRAcetam (KEPPRA XR) 750 MG 24 hr tablet Take 750 mg by mouth At Bedtime       metoprolol succinate ER (TOPROL-XL) 100 MG 24 hr tablet Take 100 mg by mouth daily      pravastatin (PRAVACHOL) 40 MG tablet Take 40 mg by mouth At Bedtime      QUEtiapine (SEROQUEL) 25 MG tablet Take 25 mg by mouth 3 times daily as needed      venlafaxine (EFFEXOR-XR) 150 MG 24 hr capsule Take 300 mg by mouth daily      buPROPion (WELLBUTRIN XL) 150 MG 24 hr tablet Take 150 mg by mouth every morning      diphenhydrAMINE (BENADRYL) 25 MG tablet Take 25 mg by mouth every 6 hours as needed for itching or allergies      LANsoprazole (PREVACID) 15 MG DR capsule Take 15 mg by mouth daily as needed      loperamide (IMODIUM) 2 MG capsule Take 2 mg by mouth every 3 hours as needed for diarrhea      LORazepam (ATIVAN PO) Take 1 mg by mouth every 6 hours as needed for anxiety       multivitamin, therapeutic (THERA-VIT) TABS tablet Take 1 tablet by mouth daily  Qty: 30 tablet, Refills: 0    Associated Diagnoses: Alcohol use disorder         STOP taking these medications       amLODIPine (NORVASC) 5 MG tablet Comments:   Reason for Stopping:         traZODone (DESYREL) 100 MG tablet Comments:   Reason for Stopping:             Allergies   Allergies   Allergen Reactions     Sulfa Drugs      Data   Most Recent 3 CBC's:  Recent Labs   Lab Test 09/06/20  0556 09/03/20  0447 09/02/20  1112   WBC 8.8 5.4 4.9   HGB 14.8 13.4 13.3   MCV 96 97 98    75* 85*      Most Recent 3 BMP's:  Recent Labs   Lab Test 09/07/20  0530 09/06/20  1354 09/06/20  0556 09/05/20  1225  09/04/20  0518   * 132* 130*  --   --  136   POTASSIUM 3.5  --  3.5 3.8   < > 3.1*   CHLORIDE 98  --  97  --   --  99   CO2 29  --  28  --   --  32   BUN 17  --  10  --   --  9   CR 0.63*  --  0.61*  --   --  0.62*   ANIONGAP 5  --  5  --   --  5   BLANCA 8.4*  --  8.7  --   --   8.9   *  --  117*  --   --  93    < > = values in this interval not displayed.     Most Recent 2 LFT's:  Recent Labs   Lab Test 09/06/20  0556 09/02/20  1112   AST 39 100*   ALT 63 87*   ALKPHOS 91 93   BILITOTAL 1.3 1.4*     Most Recent INR's and Anticoagulation Dosing History:  Anticoagulation Dose History     Recent Dosing and Labs Latest Ref Rng & Units 1/26/2006 1/31/2006 2/1/2006 2/6/2006 6/3/2017 6/4/2017 6/5/2017    INR 0.86 - 1.14 1.02 1.18(H) 1.09 1.09 1.13 1.18(H) 0.98        Most Recent 3 Troponin's:  Recent Labs   Lab Test 06/03/17  2234 06/03/17  1811   TROPI <0.015  The 99th percentile for upper reference range is 0.045 ug/L.  Troponin values in   the range of 0.045 - 0.120 ug/L may be associated with risks of adverse   clinical events.   <0.015  The 99th percentile for upper reference range is 0.045 ug/L.  Troponin values in   the range of 0.045 - 0.120 ug/L may be associated with risks of adverse   clinical events.       Most Recent Cholesterol Panel:No lab results found.  Most Recent 6 Bacteria Isolates From Any Culture (See EPIC Reports for Culture Details):  Recent Labs   Lab Test 06/03/17  1940 06/03/17  1845   CULT No growth No growth     Most Recent TSH, T4 and A1c Labs:  Recent Labs   Lab Test 06/05/17  0354   A1C 5.3     Results for orders placed or performed during the hospital encounter of 08/31/20   Head CT w/o contrast    Narrative    EXAM: CT HEAD W/O CONTRAST  LOCATION: [facility]  DATE/TIME: 9/1/2020 2:57 AM    INDICATION: Head injury  COMPARISON: None.  TECHNIQUE: Routine without IV contrast. Multiplanar reformats. Dose reduction techniques were used.    FINDINGS:  INTRACRANIAL CONTENTS: Bilateral subdural hematoma overlying cerebral hemispheres laterally. These are predominantly low density however left-sided subdural hematoma contains hyperdense acute component. 3 mm left-to-right midline shift. Probable tiny   amount of acute subdural hematoma layering on the  right tentorium. No CT evidence of acute infarct. Mild presumed chronic small vessel ischemic changes. Mild generalized volume loss. No hydrocephalus.     VISUALIZED ORBITS/SINUSES/MASTOIDS: No intraorbital abnormality. Mild mucosal thickening left maxillary sinus. No middle ear or mastoid effusion.    BONES/SOFT TISSUES: No acute abnormality.      Impression    IMPRESSION:  1.  Bilateral subdural hematoma overlying cerebral hemispheres laterally. These are predominantly low density however left-sided subdural hematoma contains hyperdense acute component.  2.  Probable tiny amount of acute subdural hematoma layering on the right tentorium.  3.  3 mm left-to-right midline shift.    Results were called to Dr. MOLINA at 9/1/2020 3:11 AM.   CT Head w/o Contrast    Narrative    CT OF THE HEAD WITHOUT CONTRAST 9/1/2020 11:52 AM     COMPARISON: Head CT 9/1/2020 at 0241 hours.    HISTORY: Intracranial hemorrhage, known, follow-up.    TECHNIQUE: 5 mm thick axial CT images of the head were acquired  without IV contrast material.    FINDINGS: Thin hypodense subdural collections along the cerebral  convexities bilaterally are again noted; the focus of hyperdense  extra-axial fluid along the lateral left temporal convexity is no  longer visualized and may have dissipated into the more predominant  surrounding hypodense subdural collection. 4-5 mm rightward midline  shift of the septum pellucidum persists without change.    There is new minimal intraventricular hemorrhage layering in the  occipital horns of the lateral ventricles on both sides. There is no  other evidence for new or recent intracranial hemorrhage.    There is moderate diffuse cerebral volume loss. There are subtle  patchy areas of decreased density in the cerebral white matter  bilaterally that are consistent with sequela of chronic small vessel  ischemic disease. The ventricles and basal cisterns are within normal  limits in configuration given the degree  of cerebral volume loss.     No intracranial mass or recent infarct.    The visualized paranasal sinuses are well-aerated. There is no  mastoiditis. There are no fractures of the visualized bones.       Impression    IMPRESSION:   1. Stable hypodense subdural collections along the cerebral  convexities on both sides with interval dissipation of the tiny focus  of hyperdense hemorrhage noted along the lateral left temporal lobe  since the comparison study.  2. Interval development of tiny foci of intraventricular hemorrhage  layering in the occipital horns of the lateral ventricles on both  sides.   3. Stable 4-5 mm rightward midline shift of the septum pellucidum.  4. Diffuse cerebral volume loss and cerebral white matter changes  consistent with chronic small vessel ischemic disease.       Radiation dose for this scan was reduced using automated exposure  control, adjustment of the mA and/or kV according to patient size, or  iterative reconstruction technique.    BOB SPANN MD

## 2020-09-09 NOTE — CONSULTS
Care Transition Initial Assessment - SW     Met with: Patient   Active Problems:    Subdural hematoma (H)       DATA  Lives With: alone   Living Arrangements: house        Who is your support system?: Neighbor and also another friend from his old neighborhood [place]  Support Assessment: Adequate social supports.. Pt lists daughter as emergency contact but she lives in [place] now.  Pt states that he is an .  Out of his seven siblings, 5 are 's. His brother's office is two blocks from his home.  Pt states that he has close friends and neighbors who also check in with him on a daily basis. His friend Yazan Ingram lives a few houses down and is currently taking care of his cat.  Pt indicates that he has a  that comes monthly. He is independent in all of his ADL'S Pt does not drive. He lives two blocks from [facility] and he walks. Pt indicates that is brother may come by today but he is not certain of this.  SW reviewed TCU recommendations with pt and hospitalist. Pt is refusing TCU stating that the staff is making a mountain out of a alvaro hill. Pt intends to discharge.home and does not agree to therapy follow up OP or home care.  Concerns regarding health management: Pt  Recommended for TCU placement and is refusing this. Pt will rely on check in's from his neighbor, Hong and sees no need for futher treatment.  Pt is requesting assistance with ride home via cab. Pt signed ANDRE form for Taxi ride.      Transportation Anticipated: family or friend will provide    ASSESSMENT  Cognitive Status:  awake, alert and oriented  Concerns to be addressed:  Discharge planning. Pt planning to discharge home this afternoon.     PLAN  Financial costs for the patient includes N/A  Patient given options and choices for discharge  Yes  Patient declines TCU and home care and OP therapy.  Patient anticipates discharging to: home via cab.    ABHISHEK Alonso  [facility]  [phone]

## 2020-09-09 NOTE — PLAN OF CARE
A and O x4. Frustrated at times, calmer this afternoon. VSS, neuros intact except for mild HA and intermittent nausea. Up with 1, belt, walker. Tolerating regular diet. Voiding without difficulty. No ativan given for CIWA scores < 7. Scoring yellow on aggression screening tool. Belongings at bedside, controlled medications remain in security.

## 2020-09-11 ENCOUNTER — TELEPHONE (OUTPATIENT)
Dept: NEUROSURGERY | Facility: CLINIC | Age: 70
End: 2020-09-11

## 2020-09-11 DIAGNOSIS — S06.5XAA SUBDURAL HEMATOMA (H): Primary | ICD-10-CM

## 2020-09-11 NOTE — TELEPHONE ENCOUNTER
"Per Pal Rosen PA-C: Stable SDH, needs follow up in 1 month with head CT prior.     Appointment scheduled with Pal Rosen PA-C on 10/5 @ 10:00am.     Needs order for head CT once pt is discharged from [facility].     Called and spoke to the patient. Was unable to fully understand him but he said to \"quit calling me\" and that he was going to follow up with a different provider. Will cancel appt and send FYI to provider.         "

## 2020-10-05 ENCOUNTER — HOSPITAL ENCOUNTER (OUTPATIENT)
Dept: CT IMAGING | Facility: CLINIC | Age: 70
Discharge: HOME | End: 2020-10-05
Attending: PHYSICIAN ASSISTANT | Admitting: PHYSICIAN ASSISTANT
Payer: COMMERCIAL

## 2020-10-05 DIAGNOSIS — S06.5XAA SUBDURAL HEMATOMA (H): ICD-10-CM

## 2020-10-05 LAB — RADIOLOGIST FLAGS: ABNORMAL

## 2020-10-05 PROCEDURE — 70450 CT HEAD/BRAIN W/O DYE: CPT

## 2020-10-12 ENCOUNTER — TELEPHONE (OUTPATIENT)
Dept: NEUROSURGERY | Facility: CLINIC | Age: 70
End: 2020-10-12

## 2020-10-12 NOTE — TELEPHONE ENCOUNTER
"Per Grace, NP: \"Can someone please call patient to reschedule his follow-up appt for SDH? He did not show today. If he can't come to clinic, then phone visit is fine. Plan will be to repeat head CT in a month, but we should check in on how he's doing symptomatically as well.\"     Attempted to reach out to patient, no answer. Left voice message asking patient to call clinic back to further discuss.       "

## 2020-10-19 ENCOUNTER — OFFICE VISIT (OUTPATIENT)
Dept: NEUROSURGERY | Facility: CLINIC | Age: 70
End: 2020-10-19
Attending: NURSE PRACTITIONER
Payer: COMMERCIAL

## 2020-10-19 VITALS
SYSTOLIC BLOOD PRESSURE: 132 MMHG | WEIGHT: 166 LBS | HEART RATE: 73 BPM | BODY MASS INDEX: 25.16 KG/M2 | DIASTOLIC BLOOD PRESSURE: 78 MMHG | HEIGHT: 68 IN | OXYGEN SATURATION: 96 % | TEMPERATURE: 98.4 F

## 2020-10-19 DIAGNOSIS — S06.5XAA SUBDURAL HEMATOMA (H): Primary | ICD-10-CM

## 2020-10-19 PROCEDURE — 99213 OFFICE O/P EST LOW 20 MIN: CPT | Performed by: NURSE PRACTITIONER

## 2020-10-19 ASSESSMENT — MIFFLIN-ST. JEOR: SCORE: 1492.47

## 2020-10-19 ASSESSMENT — PAIN SCALES - GENERAL: PAINLEVEL: NO PAIN (0)

## 2020-10-19 NOTE — PROGRESS NOTES
"[facility] Neurosurgery Clinic Visit      HPI: Jong Galarza is a 70 year old male who presents for follow-up regarding SDH.  He reports feeling well today. Denies any headaches, dizziness, vision changes, weakness, or changes in mental status. He did have an episode of nausea and a \"mild seizure\" last week when he was leaving [facility] and fell, but didn't hit his head or lose consciousness. Nausea has resolved now. He has a history of seizures and follows with [facility] clinic for Keppra management. He is taking Ibuprofen right now because of some rib pain s/p fall.     Pain right now:  0/10    Past Medical History:   Diagnosis Date     Anxiety      GERD (gastroesophageal reflux disease)      Hyperlipidemia      Hypertension      Major depression        Past Medical History reviewed with patient during visit.    Past Surgical History:   Procedure Laterality Date     C REPAIR CRUCIATE LIGAMENT,KNEE Left 1985     LAPAROSCOPIC HERNIORRHAPHY INGUINAL BILATERAL       PROSTATECTOMY PERINEAL  2006     Past Surgical History reviewed with patient during visit.    Current Outpatient Medications   Medication     acetaminophen (TYLENOL) 325 MG tablet     buPROPion (WELLBUTRIN XL) 150 MG 24 hr tablet     diphenhydrAMINE (BENADRYL) 25 MG tablet     LANsoprazole (PREVACID) 15 MG DR capsule     levETIRAcetam (KEPPRA XR) 750 MG 24 hr tablet     loperamide (IMODIUM) 2 MG capsule     LORazepam (ATIVAN PO)     metoprolol succinate ER (TOPROL-XL) 100 MG 24 hr tablet     multivitamin, therapeutic (THERA-VIT) TABS tablet     pravastatin (PRAVACHOL) 40 MG tablet     QUEtiapine (SEROQUEL) 25 MG tablet     venlafaxine (EFFEXOR-XR) 150 MG 24 hr capsule     No current facility-administered medications for this visit.        Allergies   Allergen Reactions     Sulfa Drugs        Social History     Socioeconomic History     Marital status:      Spouse name: Not on file     Number of children: Not on file     Years of education: " "Not on file     Highest education level: Not on file   Occupational History     Occupation:      Comment: Self-employed    Social Needs     Financial resource strain: Not on file     Food insecurity     Worry: Not on file     Inability: Not on file     Transportation needs     Medical: Not on file     Non-medical: Not on file   Tobacco Use     Smoking status: Not on file   Substance and Sexual Activity     Alcohol use: Not on file     Drug use: Not on file     Sexual activity: Not on file   Lifestyle     Physical activity     Days per week: Not on file     Minutes per session: Not on file     Stress: Not on file   Relationships     Social connections     Talks on phone: Not on file     Gets together: Not on file     Attends Sabianism service: Not on file     Active member of club or organization: Not on file     Attends meetings of clubs or organizations: Not on file     Relationship status: Not on file     Intimate partner violence     Fear of current or ex partner: Not on file     Emotionally abused: Not on file     Physically abused: Not on file     Forced sexual activity: Not on file   Other Topics Concern     Not on file   Social History Narrative     Not on file       ROS: 10 point ROS neg other than the symptoms noted above in the HPI.    Vital Signs: /78   Pulse 73   Temp 98.4  F (36.9  C)   Ht 5' 8\" (1.727 m)   Wt 166 lb (75.3 kg)   SpO2 96%   BMI 25.24 kg/m      Examination:  Constitutional:  Alert, well nourished, NAD.  HEENT: Normocephalic, atraumatic.   Pulm:  Without shortness of breath or audible adventitious respiratory sounds.  CV:  No pitting edema of BLE.  Brisk capillary refill, CMS intact.    Neurological:  Awake  Alert  Oriented x 3  Speech clear  Cranial nerves II - XII intact  PERRL  EOMI  Face symmetric  Tongue midline  Motor exam: 5/5 strength in all four extremities.   Sensation intact  Finger to Nose smooth  Pronator drift negative  Gait: Able to stand from a seated " "position. Normal non-antalgic, non-myelopathic gait.  Able to heel/toe walk without loss of balance    Imaging: CT of the head from 10/5/20 was reviewed in office today.     IMPRESSION:  1. Increased size of bilateral frontoparietal convexity subdural hematomas, with new/acute hemorrhagic components.  2. Unchanged slight rightward displacement of the septum pellucidum without significant subfalcine shift.  3. Interval resolution of previously seen intraventricular blood products.     Assessment/Plan:   Follow up for bilateral frontoparietal SDH. CT head was reviewed in clinic today with patient and Dr. Driscoll. Patient reports feeling well overall. Denies any headaches, dizziness, vision changes, weakness, or changes in mental status. He did have an episode of nausea and a \"mild seizure\" last week when he was leaving [facility] and fell, but didn't hit his head or lose consciousness. Nausea has resolved now. He has a history of seizures and follows with [facility] for Keppra management.    - Repeat head CT within 1-2 weeks  - Continue to hold NSAIDS until next head CT is completed   - Follow up with [facility] for seizure management   - Continue to monitor symptoms and call our clinic with any questions or concerns.  - Go to ER with any seizure activity, mental status change (increasing confusion), difficulty with speech, or increasing or acute weakness.         Grace Funk CNP  [facility]surgery  [address] [address]  Tel [phone]  Pager [phone]    "

## 2020-10-19 NOTE — LETTER
"    10/19/2020         RE: Jong Galarza  [address]  [facility] [address]        Dear Colleague,    Thank you for referring your patient, Jong Galarza, to the [facility] NEUROSURGERY CLINIC [place]. Please see a copy of my visit note below.    [facility] Neurosurgery Clinic Visit      HPI: Jong Galarza is a 70 year old male who presents for follow-up regarding SDH.  He reports feeling well today. Denies any headaches, dizziness, vision changes, weakness, or changes in mental status. He did have an episode of nausea and a \"mild seizure\" last week when he was leaving [facility] and fell, but didn't hit his head or lose consciousness. Nausea has resolved now. He has a history of seizures and follows with [facility] Neurology clinic for Keppra management. He is taking Ibuprofen right now because of some rib pain s/p fall.     Pain right now:  0/10    Past Medical History:   Diagnosis Date     Anxiety      GERD (gastroesophageal reflux disease)      Hyperlipidemia      Hypertension      Major depression        Past Medical History reviewed with patient during visit.    Past Surgical History:   Procedure Laterality Date     C REPAIR CRUCIATE LIGAMENT,KNEE Left 1985     LAPAROSCOPIC HERNIORRHAPHY INGUINAL BILATERAL       PROSTATECTOMY PERINEAL  2006     Past Surgical History reviewed with patient during visit.    Current Outpatient Medications   Medication     acetaminophen (TYLENOL) 325 MG tablet     buPROPion (WELLBUTRIN XL) 150 MG 24 hr tablet     diphenhydrAMINE (BENADRYL) 25 MG tablet     LANsoprazole (PREVACID) 15 MG DR capsule     levETIRAcetam (KEPPRA XR) 750 MG 24 hr tablet     loperamide (IMODIUM) 2 MG capsule     LORazepam (ATIVAN PO)     metoprolol succinate ER (TOPROL-XL) 100 MG 24 hr tablet     multivitamin, therapeutic (THERA-VIT) TABS tablet     pravastatin (PRAVACHOL) 40 MG tablet     QUEtiapine (SEROQUEL) 25 MG tablet     venlafaxine (EFFEXOR-XR) 150 MG 24 hr capsule     No current " "facility-administered medications for this visit.        Allergies   Allergen Reactions     Sulfa Drugs        Social History     Socioeconomic History     Marital status:      Spouse name: Not on file     Number of children: Not on file     Years of education: Not on file     Highest education level: Not on file   Occupational History     Occupation:      Comment: Self-employed    Social Needs     Financial resource strain: Not on file     Food insecurity     Worry: Not on file     Inability: Not on file     Transportation needs     Medical: Not on file     Non-medical: Not on file   Tobacco Use     Smoking status: Not on file   Substance and Sexual Activity     Alcohol use: Not on file     Drug use: Not on file     Sexual activity: Not on file   Lifestyle     Physical activity     Days per week: Not on file     Minutes per session: Not on file     Stress: Not on file   Relationships     Social connections     Talks on phone: Not on file     Gets together: Not on file     Attends Pentecostal service: Not on file     Active member of club or organization: Not on file     Attends meetings of clubs or organizations: Not on file     Relationship status: Not on file     Intimate partner violence     Fear of current or ex partner: Not on file     Emotionally abused: Not on file     Physically abused: Not on file     Forced sexual activity: Not on file   Other Topics Concern     Not on file   Social History Narrative     Not on file       ROS: 10 point ROS neg other than the symptoms noted above in the HPI.    Vital Signs: /78   Pulse 73   Temp 98.4  F (36.9  C)   Ht 5' 8\" (1.727 m)   Wt 166 lb (75.3 kg)   SpO2 96%   BMI 25.24 kg/m      Examination:  Constitutional:  Alert, well nourished, NAD.  HEENT: Normocephalic, atraumatic.   Pulm:  Without shortness of breath or audible adventitious respiratory sounds.  CV:  No pitting edema of BLE.  Brisk capillary refill, CMS " "intact.    Neurological:  Awake  Alert  Oriented x 3  Speech clear  Cranial nerves II - XII intact  PERRL  EOMI  Face symmetric  Tongue midline  Motor exam: 5/5 strength in all four extremities.   Sensation intact  Finger to Nose smooth  Pronator drift negative  Gait: Able to stand from a seated position. Normal non-antalgic, non-myelopathic gait.  Able to heel/toe walk without loss of balance    Imaging: CT of the head from 10/5/20 was reviewed in office today.     IMPRESSION:  1. Increased size of bilateral frontoparietal convexity subdural hematomas, with new/acute hemorrhagic components.  2. Unchanged slight rightward displacement of the septum pellucidum without significant subfalcine shift.  3. Interval resolution of previously seen intraventricular blood products.     Assessment/Plan:   Follow up for bilateral frontoparietal SDH. CT head was reviewed in clinic today with patient and Dr. Driscoll. Patient reports feeling well overall. Denies any headaches, dizziness, vision changes, weakness, or changes in mental status. He did have an episode of nausea and a \"mild seizure\" last week when he was leaving [facility] and fell, but didn't hit his head or lose consciousness. Nausea has resolved now. He has a history of seizures and follows with [facility] for Keppra management.    - Repeat head CT within 1-2 weeks  - Continue to hold NSAIDS until next head CT is completed   - Follow up with [facility] for seizure management   - Continue to monitor symptoms and call our clinic with any questions or concerns.  - Go to ER with any seizure activity, mental status change (increasing confusion), difficulty with speech, or increasing or acute weakness.         Grace Funk CNP  [facility] Neurosurgery  [address] [address]  Tel [phone]  Pager [phone]        Again, thank you for allowing me to participate in the care of your patient.  "       Sincerely,        Grace Funk, NP

## 2020-10-19 NOTE — PATIENT INSTRUCTIONS
- Repeat head CT within 1-2 weeks  - Continue to hold NSAIDS until next head CT is completed   - [facility]   - Continue to monitor symptoms and call our clinic with any questions or concerns.  - Go to ER with any seizure activity, mental status change (increasing confusion), difficulty with speech, or increasing or acute weakness.

## 2020-10-19 NOTE — NURSING NOTE
"Jong Galarza is a 70 year old male who presents for:  Chief Complaint   Patient presents with     Follow Up     ED f/u SDH        Initial Vitals:  /78   Pulse 73   Temp 98.4  F (36.9  C)   Ht 5' 8\" (1.727 m)   Wt 166 lb (75.3 kg)   SpO2 96%   BMI 25.24 kg/m   Estimated body mass index is 25.24 kg/m  as calculated from the following:    Height as of this encounter: 5' 8\" (1.727 m).    Weight as of this encounter: 166 lb (75.3 kg).. Body surface area is 1.9 meters squared. BP completed using cuff size: regular  No Pain (0)    Nursing Comments: Patient presents w for ED f/u SDH    Abilio Camacho MA  "

## 2020-11-06 ENCOUNTER — OFFICE VISIT (OUTPATIENT)
Dept: NEUROSURGERY | Facility: CLINIC | Age: 70
End: 2020-11-06
Attending: NURSE PRACTITIONER
Payer: COMMERCIAL

## 2020-11-06 ENCOUNTER — HOSPITAL ENCOUNTER (OUTPATIENT)
Dept: CT IMAGING | Facility: CLINIC | Age: 70
End: 2020-11-06
Attending: NURSE PRACTITIONER
Payer: COMMERCIAL

## 2020-11-06 VITALS
DIASTOLIC BLOOD PRESSURE: 85 MMHG | BODY MASS INDEX: 25.16 KG/M2 | WEIGHT: 166 LBS | HEIGHT: 68 IN | OXYGEN SATURATION: 95 % | SYSTOLIC BLOOD PRESSURE: 135 MMHG | HEART RATE: 72 BPM

## 2020-11-06 DIAGNOSIS — S06.5XAA SUBDURAL HEMATOMA (H): ICD-10-CM

## 2020-11-06 DIAGNOSIS — S06.5XAA SUBDURAL HEMATOMA (H): Primary | ICD-10-CM

## 2020-11-06 PROCEDURE — G0463 HOSPITAL OUTPT CLINIC VISIT: HCPCS | Mod: 25

## 2020-11-06 PROCEDURE — 99214 OFFICE O/P EST MOD 30 MIN: CPT | Performed by: NURSE PRACTITIONER

## 2020-11-06 PROCEDURE — 70450 CT HEAD/BRAIN W/O DYE: CPT

## 2020-11-06 ASSESSMENT — MIFFLIN-ST. JEOR: SCORE: 1492.47

## 2020-11-06 NOTE — PROGRESS NOTES
"[facility] Neurosurgery Clinic Visit      HPI: Jong Galarza is a 70 year old male who presents for follow-up regarding SDH.  He reports feeling well today. Denies any headaches, dizziness, vision changes, weakness, or changes in mental status. He did have an episode of nausea and a \"mild seizure\" last week when he was leaving [facility] and fell, but didn't hit his head or lose consciousness. Nausea has resolved now. He has a history of seizures and follows with [facility]rology clinic for Keppra management. He is taking Ibuprofen right now because of some rib pain s/p fall.     Presents today for follow-up regarding SDH. He denies any headaches, dizziness, vision changes, weakness, or changes in mental status. He denies any recent falls, trauma, or injuries. No seizure activity since last visit. He reports feeling nauseous 1-2 days per week, but feels this may be related to his GERD. Not taking any blood thinners.     Pain right now:  0/10    Past Medical History:   Diagnosis Date     Anxiety      GERD (gastroesophageal reflux disease)      Hyperlipidemia      Hypertension      Major depression        Past Medical History reviewed with patient during visit.    Past Surgical History:   Procedure Laterality Date     C REPAIR CRUCIATE LIGAMENT,KNEE Left 1985     LAPAROSCOPIC HERNIORRHAPHY INGUINAL BILATERAL       PROSTATECTOMY PERINEAL  2006     Past Surgical History reviewed with patient during visit.    Current Outpatient Medications   Medication     acetaminophen (TYLENOL) 325 MG tablet     buPROPion (WELLBUTRIN XL) 150 MG 24 hr tablet     diphenhydrAMINE (BENADRYL) 25 MG tablet     LANsoprazole (PREVACID) 15 MG DR capsule     levETIRAcetam (KEPPRA XR) 750 MG 24 hr tablet     loperamide (IMODIUM) 2 MG capsule     LORazepam (ATIVAN PO)     metoprolol succinate ER (TOPROL-XL) 100 MG 24 hr tablet     multivitamin, therapeutic (THERA-VIT) TABS tablet     pravastatin (PRAVACHOL) 40 MG tablet     QUEtiapine (SEROQUEL) " "25 MG tablet     venlafaxine (EFFEXOR-XR) 150 MG 24 hr capsule     No current facility-administered medications for this visit.        Allergies   Allergen Reactions     Sulfa Drugs        Social History     Socioeconomic History     Marital status:      Spouse name: Not on file     Number of children: Not on file     Years of education: Not on file     Highest education level: Not on file   Occupational History     Occupation:      Comment: Self-employed    Social Needs     Financial resource strain: Not on file     Food insecurity     Worry: Not on file     Inability: Not on file     Transportation needs     Medical: Not on file     Non-medical: Not on file   Tobacco Use     Smoking status: Not on file   Substance and Sexual Activity     Alcohol use: Not on file     Drug use: Not on file     Sexual activity: Not on file   Lifestyle     Physical activity     Days per week: Not on file     Minutes per session: Not on file     Stress: Not on file   Relationships     Social connections     Talks on phone: Not on file     Gets together: Not on file     Attends Amish service: Not on file     Active member of club or organization: Not on file     Attends meetings of clubs or organizations: Not on file     Relationship status: Not on file     Intimate partner violence     Fear of current or ex partner: Not on file     Emotionally abused: Not on file     Physically abused: Not on file     Forced sexual activity: Not on file   Other Topics Concern     Not on file   Social History Narrative     Not on file       ROS: 10 point ROS neg other than the symptoms noted above in the HPI.    Vital Signs: /85   Pulse 72   Ht 5' 8\" (1.727 m)   Wt 166 lb (75.3 kg)   SpO2 95%   BMI 25.24 kg/m      Examination:  Constitutional:  Alert, well nourished, NAD.  HEENT: Normocephalic, atraumatic.   Pulm:  Without shortness of breath or audible adventitious respiratory sounds.  CV:  No pitting edema of BLE.  Brisk " capillary refill, CMS intact.    Neurological:  Awake  Alert  Oriented x 3  Speech clear  Cranial nerves II - XII intact  PERRL  EOMI  Face symmetric  Tongue midline  Motor exam: 5/5 strength in all four extremities.   Sensation intact  Finger to Nose smooth  Pronator drift negative  Gait: Able to stand from a seated position. Normal non-antalgic, non-myelopathic gait.  Able to heel/toe walk without loss of balance    Imaging: CT of the head from  today was reviewed in office.                                                                   IMPRESSION:   1. Interval resolution of the subdural collection from the left cervical convexity since the comparison study.  2. Slight interval decrease in thickness of the subdural collection along the right cerebral convexity since the comparison study. However, there are scattered foci of increased density within this collection on the current exam that suggests small amount of interval  hemorrhage within the collection since the comparison study. Continued surveillance recommended.  3. Diffuse cerebral volume loss and cerebral white matter changes consistent with chronic small vessel ischemic disease. No evidence for acute intracranial pathology.       Assessment/Plan:   Subdural hematoma    Presents today for follow-up regarding SDH. He denies any headaches, dizziness, vision changes, weakness, or changes in mental status. He denies any recent falls, trauma, or injuries. No seizure activity but continues with Keppra per [facility]. He reports feeling nauseous 1-2 days per week, but feels this may be related to his GERD. He is neurologically intact on exam. CT head reviewed in clinic today with Robel Rosen PA-C. Patient voiced agreement with the below recommendations and plan of care at this time.     Patient Instructions   - Follow up in 4-6 weeks with repeat head CT prior  - Continue to hold NSAIDS until next head CT is completed   - Continue with [facility]  for Keppra/seizure management   - Continue to monitor symptoms and call our clinic with any questions or concerns.  - Go to ER with any seizure activity, mental status change (increasing confusion), difficulty with speech, or increasing or acute weakness.     Grace Funk CNP  [facility] Neurosurgery  [address] [address]  Tel [phone]  Pager [phone]

## 2020-11-06 NOTE — LETTER
"    11/6/2020         RE: Jong Galarza  [address]  [facility] [address]        Dear Colleague,    Thank you for referring your patient, Jong Galarza, to the [facility] NEUROSURGERY CLINIC [place]. Please see a copy of my visit note below.    [facility] Neurosurgery Clinic Visit      HPI: Jong Galarza is a 70 year old male who presents for follow-up regarding SDH.  He reports feeling well today. Denies any headaches, dizziness, vision changes, weakness, or changes in mental status. He did have an episode of nausea and a \"mild seizure\" last week when he was leaving [facility] and fell, but didn't hit his head or lose consciousness. Nausea has resolved now. He has a history of seizures and follows with [facility]rology clinic for Keppra management. He is taking Ibuprofen right now because of some rib pain s/p fall.     Presents today for follow-up regarding SDH. He denies any headaches, dizziness, vision changes, weakness, or changes in mental status. He denies any recent falls, trauma, or injuries. No seizure activity since last visit. He reports feeling nauseous 1-2 days per week, but feels this may be related to his GERD.     Pain right now:  0/10    Past Medical History:   Diagnosis Date     Anxiety      GERD (gastroesophageal reflux disease)      Hyperlipidemia      Hypertension      Major depression        Past Medical History reviewed with patient during visit.    Past Surgical History:   Procedure Laterality Date     C REPAIR CRUCIATE LIGAMENT,KNEE Left 1985     LAPAROSCOPIC HERNIORRHAPHY INGUINAL BILATERAL       PROSTATECTOMY PERINEAL  2006     Past Surgical History reviewed with patient during visit.    Current Outpatient Medications   Medication     acetaminophen (TYLENOL) 325 MG tablet     buPROPion (WELLBUTRIN XL) 150 MG 24 hr tablet     diphenhydrAMINE (BENADRYL) 25 MG tablet     LANsoprazole (PREVACID) 15 MG DR capsule     levETIRAcetam (KEPPRA XR) 750 MG 24 hr tablet     loperamide " "(IMODIUM) 2 MG capsule     LORazepam (ATIVAN PO)     metoprolol succinate ER (TOPROL-XL) 100 MG 24 hr tablet     multivitamin, therapeutic (THERA-VIT) TABS tablet     pravastatin (PRAVACHOL) 40 MG tablet     QUEtiapine (SEROQUEL) 25 MG tablet     venlafaxine (EFFEXOR-XR) 150 MG 24 hr capsule     No current facility-administered medications for this visit.        Allergies   Allergen Reactions     Sulfa Drugs        Social History     Socioeconomic History     Marital status:      Spouse name: Not on file     Number of children: Not on file     Years of education: Not on file     Highest education level: Not on file   Occupational History     Occupation:      Comment: Self-employed    Social Needs     Financial resource strain: Not on file     Food insecurity     Worry: Not on file     Inability: Not on file     Transportation needs     Medical: Not on file     Non-medical: Not on file   Tobacco Use     Smoking status: Not on file   Substance and Sexual Activity     Alcohol use: Not on file     Drug use: Not on file     Sexual activity: Not on file   Lifestyle     Physical activity     Days per week: Not on file     Minutes per session: Not on file     Stress: Not on file   Relationships     Social connections     Talks on phone: Not on file     Gets together: Not on file     Attends Buddhist service: Not on file     Active member of club or organization: Not on file     Attends meetings of clubs or organizations: Not on file     Relationship status: Not on file     Intimate partner violence     Fear of current or ex partner: Not on file     Emotionally abused: Not on file     Physically abused: Not on file     Forced sexual activity: Not on file   Other Topics Concern     Not on file   Social History Narrative     Not on file       ROS: 10 point ROS neg other than the symptoms noted above in the HPI.    Vital Signs: /85   Pulse 72   Ht 5' 8\" (1.727 m)   Wt 166 lb (75.3 kg)   SpO2 95%   BMI " 25.24 kg/m      Examination:  Constitutional:  Alert, well nourished, NAD.  HEENT: Normocephalic, atraumatic.   Pulm:  Without shortness of breath or audible adventitious respiratory sounds.  CV:  No pitting edema of BLE.  Brisk capillary refill, CMS intact.    Neurological:  Awake  Alert  Oriented x 3  Speech clear  Cranial nerves II - XII intact  PERRL  EOMI  Face symmetric  Tongue midline  Motor exam: 5/5 strength in all four extremities.   Sensation intact  Finger to Nose smooth  Pronator drift negative  Gait: Able to stand from a seated position. Normal non-antalgic, non-myelopathic gait.  Able to heel/toe walk without loss of balance    Imaging: CT of the head from  today was reviewed in office.                                                                   IMPRESSION:   1. Interval resolution of the subdural collection from the left cervical convexity since the comparison study.  2. Slight interval decrease in thickness of the subdural collection along the right cerebral convexity since the comparison study. However, there are scattered foci of increased density within this collection on the current exam that suggests small amount of interval  hemorrhage within the collection since the comparison study. Continued surveillance recommended.  3. Diffuse cerebral volume loss and cerebral white matter changes consistent with chronic small vessel ischemic disease. No evidence for acute intracranial pathology.       Assessment/Plan:   Subdural hematoma    Presents today for follow-up regarding SDH. He denies any headaches, dizziness, vision changes, weakness, or changes in mental status. He denies any recent falls, trauma, or injuries. No seizure activity but continues with Keppra per [facility]. He reports feeling nauseous 1-2 days per week, but feels this may be related to his GERD. He is neurologically intact on exam. CT head reviewed in clinic today with Robel Rosen PA-C. Patient voiced agreement with  the below recommendations and plan of care at this time.     Patient Instructions   - Follow up in 4-6 weeks with repeat head CT prior  - Continue to hold NSAIDS until next head CT is completed   - Continue with [facility] for Keppra/seizure management   - Continue to monitor symptoms and call our clinic with any questions or concerns.  - Go to ER with any seizure activity, mental status change (increasing confusion), difficulty with speech, or increasing or acute weakness.     Grace Funk CNP  [facility]surgery  [address] [address]  Tel [phone]  Pager [phone]            Again, thank you for allowing me to participate in the care of your patient.        Sincerely,        Grace Funk, RON

## 2020-11-06 NOTE — PATIENT INSTRUCTIONS
- Follow up in 4-6 weeks with repeat head CT prior  - Continue to hold NSAIDS until next head CT is completed   - Continue with [facility] for Keppra/seizure management   - Continue to monitor symptoms and call our clinic with any questions or concerns.  - Go to ER with any seizure activity, mental status change (increasing confusion), difficulty with speech, or increasing or acute weakness.

## 2022-05-11 ENCOUNTER — HOSPITAL ENCOUNTER (EMERGENCY)
Facility: CLINIC | Age: 72
Discharge: HOME | End: 2022-05-11
Attending: EMERGENCY MEDICINE | Admitting: EMERGENCY MEDICINE
Payer: COMMERCIAL

## 2022-05-11 VITALS
HEIGHT: 68 IN | SYSTOLIC BLOOD PRESSURE: 132 MMHG | RESPIRATION RATE: 18 BRPM | HEART RATE: 120 BPM | WEIGHT: 160 LBS | OXYGEN SATURATION: 94 % | TEMPERATURE: 98.8 F | BODY MASS INDEX: 24.25 KG/M2 | DIASTOLIC BLOOD PRESSURE: 87 MMHG

## 2022-05-11 DIAGNOSIS — S31.119A LACERATION OF ABDOMINAL WALL, INITIAL ENCOUNTER: ICD-10-CM

## 2022-05-11 DIAGNOSIS — S30.811A ABDOMINAL WALL ABRASION, INITIAL ENCOUNTER: ICD-10-CM
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
## 2022-05-11 DIAGNOSIS — S30.1XXA CONTUSION OF ABDOMINAL WALL, INITIAL ENCOUNTER: ICD-10-CM

## 2022-05-11 PROCEDURE — 99282 EMERGENCY DEPT VISIT SF MDM: CPT

## 2022-05-11 ASSESSMENT — ENCOUNTER SYMPTOMS
WOUND: 1
NAUSEA: 0

## 2022-05-11 NOTE — ED TRIAGE NOTES
Pt was attacked by a Russian meyers roughly an hour ago around 1330. Bite wounds to right lower abdomen. Bleeding minimally in triage. Pt states he was knocked down when the dog attacked him, doesn't think he hit his head. Pt A&Ox4     Triage Assessment     Row Name 05/11/22 1436       Triage Assessment (Adult)    Airway WDL WDL       Respiratory WDL    Respiratory WDL WDL       Skin Circulation/Temperature WDL    Skin Circulation/Temperature WDL WDL       Cardiac WDL    Cardiac WDL X;rhythm    Cardiac Rhythm tachycardic       Peripheral/Neurovascular WDL    Peripheral Neurovascular WDL WDL       Cognitive/Neuro/Behavioral WDL    Cognitive/Neuro/Behavioral WDL WDL

## 2022-05-11 NOTE — ED PROVIDER NOTES
History   Chief Complaint:  Dog Bite       HPI   Jong Galarza is a 72 year old male presents with dog bite. About 2 hours ago, patient was attacked by a neighbor's [place] meyers who ran across the street. He has a bite wound to right lower abdomin with minimal bleeding. Patient denies injury anywhere else.Patient states that he was knocked down when the dog attacked him and denies head injury. He talked to the neighbor who said the dog was vaccinated. No history of immunosuppressant issues or diabetes. Patient is taking Amoxillin for dental issues and will finish in a couple of days. No blood thinners. He denies nausea. Of note, his last tetanus shot was in 2020.    Review of Systems   Gastrointestinal: Negative for nausea.   Skin: Positive for wound.   All other systems reviewed and are negative.    Allergies:  Sulfa Drugs    Medications:  buPROPion (WELLBUTRIN XL) 150 MG 24 hr tablet  diphenhydrAMINE (BENADRYL) 25 MG tablet  LANsoprazole (PREVACID) 15 MG DR capsule  levETIRAcetam (KEPPRA XR) 750 MG 24 hr tablet  loperamide (IMODIUM) 2 MG capsule  LORazepam (ATIVAN PO)  metoprolol succinate ER (TOPROL-XL) 100 MG 24 hr tablet  pravastatin (PRAVACHOL) 40 MG tablet  QUEtiapine (SEROQUEL) 25 MG tablet  venlafaxine (EFFEXOR-XR) 150 MG 24 hr capsule  Amoxillin     Past Medical History:     Depression  GERD  Hyperlipidemia  Hypertension  Anxiety   Seizure disorder  Cognitive impairment  Prostate cancer  Alcoholism  Alcohol withdrawal  Esophageal reflux  Acute pancreatitis  Calculus of gallbladder without mention of cholecystitis, with obstruction    Past Surgical History:    Lap herniorrhaphy inguinal bilateral  Prostatectomy perineal  Repair cruciate ligament, knee   Cholecystectomy    Family History:    Father: MI  Mother: breast cancer  Sister: melanoma    Social History:  Presents alone via private car. Never smoker. History of alcohol abuse.    Physical Exam     Patient Vitals for the past 24 hrs:   BP Temp  "Temp src Pulse Resp SpO2 Height Weight   05/11/22 1433 132/87 98.8  F (37.1  C) Temporal 120 18 94 % 1.727 m (5' 8\") 72.6 kg (160 lb)     Physical Exam  SKIN: 10 cm area of ecchymoses of the right lower abdomen with associated abrasions and a 1.5 cm superficial laceration.  Wounds are clean.  No active bleeding.  No foreign body.  No extremity trauma.  HEMATOLOGIC/IMMUNOLOGIC/LYMPHATIC:  No pallor.  EYES:  Conjunctivae normal.  CARDIOVASCULAR:  Regular rate and rhythm.  RESPIRATORY:  No respiratory distress, breath sounds equal and normal.  GASTROINTESTINAL:  Soft, nontender abdomen barring at the region of trauma.  MUSCULOSKELETAL: Normal body habitus.  NEUROLOGIC:  Alert, conversant.  PSYCHIATRIC:  Normal mood.    Emergency Department Course       ECG results from 06/03/17   EKG 12-lead     Value    Interpretation ECG Click View Image link to view waveform and result   EKG 12-lead, tracing only     Value    Interpretation ECG Click View Image link to view waveform and result       Emergency Department Course:     Reviewed:  I reviewed nursing notes, vitals, past medical history, Care Everywhere and [facility]    Assessments:  1537 I obtained history and examined the patient as noted above.     Disposition:  The patient was discharged to home.     Impression & Plan     Medical Decision Making:  This patient presents after being bit by a dog, he confirmed this was a vaccinated animal.  The trauma is relatively superficial.  With concern of closing the laceration and ensuing infection I opted to let this heal by secondary intention.  Is a relatively superficial laceration.  The patient is comfortable with this plan.  I prescribed Augmentin in case he develops signs and symptoms of infection regarding the wound.  Follow-up advised if that is the case.    Diagnosis:    ICD-10-CM    1. Dog bite, initial encounter  W54.0XXA    2. Contusion of abdominal wall, initial encounter  S30.1XXA    3. Laceration of abdominal wall, " initial encounter  S31.119A    4. Abdominal wall abrasion, initial encounter  S30.811A        Discharge Medications:  Discharge Medication List as of 5/11/2022  3:54 PM      START taking these medications    Details   amoxicillin-clavulanate (AUGMENTIN) 875-125 MG tablet Take 1 tablet by mouth 2 times daily for 7 days, Disp-14 tablet, R-0, Local Print             Scribe Disclosure:  I, Jacqueline Pratt, am serving as a scribe at 3:37 PM on 5/11/2022 to document services personally performed by Alvino Camp MD based on my observations and the provider's statements to me.            Alvino Camp MD  05/11/22 2043

## 2022-06-10 NOTE — PLAN OF CARE
Alert, oriented except to exact date. Forgetful. Mild HA and required zofran x1 for nausea this morning. CIWA < 7. Good appetite on regular diet. Voiding without difficulty. BS positive, LS clear. Up with 1, belt, walker. Unsteady gait, pt aware of needed for added safety at this time. Walker issued for discharge and OP therapies ordered. Pt aware of need to make follow up appointments with his PCP, psychiatrist, and neurologist. Pt aware of all discharge recommendations including medication changes, appointments and imaging, and when to access emergency medical assistance. Reviewed signs/symptoms of SDH. Scoring yellow on aggression screening tool. Ativan returned from security, pt leaving with home meds, clothes, shoes, and walker. Discharging home via taxi.    Platelets above 700K in 5/2022. Pt denies h/o intervention for thrombocytosis. Pending hematology evaluation.

## 2022-12-06 NOTE — PROGRESS NOTES
Date:December 7, 2022      Provider requested that no letter be sent. Do not send.       [facility]       Continuous oximetry by RT. Low alarm 90%. Charla Ring, RT